# Patient Record
Sex: FEMALE | Race: WHITE | NOT HISPANIC OR LATINO | Employment: OTHER | URBAN - NONMETROPOLITAN AREA
[De-identification: names, ages, dates, MRNs, and addresses within clinical notes are randomized per-mention and may not be internally consistent; named-entity substitution may affect disease eponyms.]

---

## 2017-10-30 ENCOUNTER — OFFICE VISIT (OUTPATIENT)
Dept: GASTROENTEROLOGY | Facility: CLINIC | Age: 67
End: 2017-10-30

## 2017-10-30 ENCOUNTER — TELEPHONE (OUTPATIENT)
Dept: GASTROENTEROLOGY | Facility: CLINIC | Age: 67
End: 2017-10-30

## 2017-10-30 VITALS
SYSTOLIC BLOOD PRESSURE: 132 MMHG | HEIGHT: 65 IN | OXYGEN SATURATION: 98 % | HEART RATE: 69 BPM | WEIGHT: 123 LBS | DIASTOLIC BLOOD PRESSURE: 70 MMHG | BODY MASS INDEX: 20.49 KG/M2 | TEMPERATURE: 96.7 F

## 2017-10-30 DIAGNOSIS — K86.89 PANCREATIC INSUFFICIENCY: Primary | ICD-10-CM

## 2017-10-30 DIAGNOSIS — D12.6 ADENOMATOUS POLYP OF COLON, UNSPECIFIED PART OF COLON: ICD-10-CM

## 2017-10-30 PROBLEM — K63.5 COLON POLYP: Status: ACTIVE | Noted: 2017-10-30

## 2017-10-30 PROCEDURE — 99212 OFFICE O/P EST SF 10 MIN: CPT | Performed by: INTERNAL MEDICINE

## 2017-10-30 RX ORDER — LEVOTHYROXINE SODIUM 0.1 MG/1
100 TABLET ORAL DAILY
COMMUNITY
Start: 2017-10-10

## 2017-10-30 RX ORDER — BUMETANIDE 1 MG/1
1 TABLET ORAL DAILY
COMMUNITY
Start: 2017-10-20

## 2017-10-30 RX ORDER — BUTALBITAL, ACETAMINOPHEN AND CAFFEINE 50; 325; 40 MG/1; MG/1; MG/1
1 TABLET ORAL EVERY 8 HOURS PRN
COMMUNITY
Start: 2017-10-06

## 2017-10-30 RX ORDER — ERGOCALCIFEROL 1.25 MG/1
50000 CAPSULE ORAL 2 TIMES WEEKLY
COMMUNITY

## 2017-10-30 RX ORDER — IBUPROFEN 200 MG
200 TABLET ORAL EVERY 6 HOURS PRN
COMMUNITY
End: 2022-12-16

## 2017-10-30 RX ORDER — SITAGLIPTIN 100 MG/1
TABLET, FILM COATED ORAL
COMMUNITY
Start: 2017-09-08 | End: 2022-12-16

## 2017-10-30 RX ORDER — INSULIN DETEMIR 100 [IU]/ML
22 INJECTION, SOLUTION SUBCUTANEOUS DAILY
COMMUNITY
Start: 2017-09-22

## 2017-10-30 RX ORDER — POTASSIUM CHLORIDE 20 MEQ/1
20 TABLET, EXTENDED RELEASE ORAL 4 TIMES DAILY
COMMUNITY
Start: 2017-10-10

## 2017-10-30 RX ORDER — PANTOPRAZOLE SODIUM 40 MG/1
40 TABLET, DELAYED RELEASE ORAL DAILY
COMMUNITY
Start: 2017-10-06

## 2017-10-30 RX ORDER — SPIRONOLACTONE 25 MG/1
TABLET ORAL
COMMUNITY
Start: 2017-10-10 | End: 2022-12-16

## 2017-10-30 RX ORDER — SULFAMETHOXAZOLE AND TRIMETHOPRIM 800; 160 MG/1; MG/1
TABLET ORAL
COMMUNITY
Start: 2017-09-08 | End: 2017-10-30

## 2017-10-30 RX ORDER — INSULIN ASPART 100 [IU]/ML
INJECTION, SOLUTION INTRAVENOUS; SUBCUTANEOUS TAKE AS DIRECTED
COMMUNITY
Start: 2017-10-06

## 2017-10-30 NOTE — PROGRESS NOTES
[facility]enterology    Chief Complaint   Patient presents with   • GI Problem     no pancreas       Subjective     HPI    Gabi Dye is a 67 y.o. female who presents with a chief complaint of  Pancreatic insufficiency.    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 Mccauley in [place].  She has done great.  She has no further abdominal pain.  She is been weaned off all narcotics.  She still sees her endocrinologist every 6 months in [place].  She still requires Creon 36 one tablet with each meal.  Post transplant she was taking 3 tablets with each meal.  The 36 and 36,000 units of lipase in each tablet.  She denies any oily stool.  She is maintaining her weight.  She tells me her hemoglobin A1c runs in the low 6 range.  She states her insulin function is back to where it was pre-pancreatectomy.  She does Dum[place] her sugars bottoming out.      Past Medical History:   Diagnosis Date   • Antritis (stomach)    • Arthritis    • Colon polyp    • Disease of thyroid gland    • Hyperlipidemia    • Hypertension    • Meniere's disease    • Pancreatitis    • Shingles    • Skin cancer        Past Surgical History:   Procedure Laterality Date   • BACK SURGERY     • CHOLECYSTECTOMY     • COLONOSCOPY  09/12/2013   • ENDOSCOPY  03/24/2015   • ENDOSCOPY  09/23/2015   • HEMORRHOIDECTOMY     • HERNIA REPAIR     • HYSTERECTOMY     • MASTECTOMY     • PANCREAS SURGERY  12/22/2015    with auto-islet cell transplantation   • PANCREATECTOMY     • TONSILLECTOMY           Current Outpatient Prescriptions:   •  bumetanide (BUMEX) 1 MG tablet, , Disp: , Rfl:   •  butalbital-acetaminophen-caffeine (FIORICET, ESGIC) -40 MG per tablet, , Disp: , Rfl:   •  ibuprofen (ADVIL,MOTRIN) 200 MG tablet, Take 200 mg by mouth Every 6 (Six) Hours As Needed for Mild Pain ., Disp: , Rfl:   •  JANUVIA 100 MG tablet, , Disp: , Rfl:   •  LEVEMIR FLEXTOUCH 100 UNIT/ML injection, , Disp: , Rfl:   •   levothyroxine (SYNTHROID, LEVOTHROID) 100 MCG tablet, , Disp: , Rfl:   •  metFORMIN (GLUCOPHAGE) 500 MG tablet, Take 500 mg by mouth 2 (Two) Times a Day With Meals., Disp: , Rfl:   •  NOVOLOG FLEXPEN 100 UNIT/ML solution pen-injector sc pen, , Disp: , Rfl:   •  Pancrelipase, Lip-Prot-Amyl, (CREON) 05255 units capsule delayed-release particles, Take 36,000 units of lipase by mouth 4 (Four) Times a Day Before Meals & at Bedtime., Disp: 120 capsule, Rfl: 11  •  pantoprazole (PROTONIX) 40 MG EC tablet, , Disp: , Rfl:   •  potassium chloride (K-DUR,KLOR-CON) 20 MEQ CR tablet, , Disp: , Rfl:   •  spironolactone (ALDACTONE) 25 MG tablet, , Disp: , Rfl:   •  vitamin D (ERGOCALCIFEROL) 24256 units capsule capsule, Take 50,000 Units by mouth 2 (Two) Times a Week., Disp: , Rfl:     Allergies   Allergen Reactions   • Adhesive Tape    • Bacitracin    • Compazine [Prochlorperazine Edisylate]    • Neosporin [Neomycin-Bacitracin Zn-Polymyx]        Social History     Social History   • Marital status:      Spouse name: N/A   • Number of children: N/A   • Years of education: N/A     Occupational History   • Not on file.     Social History Main Topics   • Smoking status: Current Some Day Smoker   • Smokeless tobacco: Never Used   • Alcohol use No   • Drug use: No   • Sexual activity: Not on file     Other Topics Concern   • Not on file     Social History Narrative       Family History   Problem Relation Age of Onset   • Colon polyps Father    • Colon cancer Neg Hx        Review of Systems  General no fever chills or sweats weight stable  Gastrointestinal: Not present-abdominal pain, constipation, diarrhea, dysphagia, hematemesis, melena, odynophagia, nausea, vomiting, pyrosis, regurgitation, hematochezia,    Objective     Vitals:    10/30/17 1428   BP: 132/70   Pulse: 69   Temp: 96.7 °F (35.9 °C)   SpO2: 98%       Physical Exam  No acute distress. Vital signs as documented. Skin warm and dry and without overt rashes. EOMI,  sclera anicteric.  Neck without JVD or masses. Lungs clear to auscultation bilaterally, no rales. Heart exam notable for regular rhythm, normal sounds and absence of loud murmurs, rubs or gallops. Abdomen is soft, nontender, non distended, normal bowel sounds and without evidence of organomegaly, masses, or abdominal aortic enlargement. Extremities nonedematous, no cyanosis. Neuro alert, moves extremities.    No visits with results within 2 Week(s) from this visit.  Latest known visit with results is:    Admission on 01/06/2016, Discharged on 01/06/2016   Component Date Value Ref Range Status   • Glucose 01/06/2016 65* 70 - 100 mg/dL Final       No Images in the past 120 days found..      Assessment/Plan      Problem List Items Addressed This Visit        Digestive    Colon polyp       Other    Pancreatic insufficiency - Primary             She is doing well status post pancreatectomy with auto islet cell transplant.  She will need to stay on pancreatic enzyme supplementation lifelong.  I renewed her prescriptions for Creon 36 one tablet at start of each meal.  She actually takes some in the middle of each meal as instructed by her transplant surgeon.  She has done well with this.    She did have a colonoscopy in September 2013 finding 5 small polyps with a combination of hyperplastic and adenomatous polyps.  Most them were hyperplastic.  I therefore recommend a surveillance examination again in September 2018.  She is in agreement and I asked her to contact us next summer to set this colonoscopy up.  We did discuss the prep and the risks benefits alternatives today of the procedure.  She expressed understanding.  Continue ongoing management by primary care provider and other specialists.     EMR Dragon/transcription disclaimer:  Much of this encounter note is electronic transcription/translation of spoken language to printed text.  The electronic translation of spoken language may be erroneous, or at times,  nonsensical words or phrases may be inadvertently transcribed.  Although I have reviewed the note for such errors, some may still exist.    Biju Celis MD  4:00 PM  10/30/17

## 2018-09-10 ENCOUNTER — PREP FOR SURGERY (OUTPATIENT)
Dept: OTHER | Facility: HOSPITAL | Age: 68
End: 2018-09-10

## 2018-09-10 ENCOUNTER — TELEPHONE (OUTPATIENT)
Dept: GASTROENTEROLOGY | Facility: CLINIC | Age: 68
End: 2018-09-10

## 2018-09-10 DIAGNOSIS — D12.6 ADENOMATOUS POLYP OF COLON, UNSPECIFIED PART OF COLON: Primary | ICD-10-CM

## 2018-09-12 PROBLEM — D12.6 ADENOMATOUS POLYP OF COLON: Status: ACTIVE | Noted: 2018-09-12

## 2018-10-29 ENCOUNTER — ANESTHESIA EVENT (OUTPATIENT)
Dept: GASTROENTEROLOGY | Facility: HOSPITAL | Age: 68
End: 2018-10-29

## 2018-10-29 ENCOUNTER — ANESTHESIA (OUTPATIENT)
Dept: GASTROENTEROLOGY | Facility: HOSPITAL | Age: 68
End: 2018-10-29

## 2018-10-29 ENCOUNTER — HOSPITAL ENCOUNTER (OUTPATIENT)
Facility: HOSPITAL | Age: 68
Setting detail: HOSPITAL OUTPATIENT SURGERY
Discharge: HOME | End: 2018-10-29
Attending: INTERNAL MEDICINE | Admitting: INTERNAL MEDICINE

## 2018-10-29 VITALS
TEMPERATURE: 97.5 F | DIASTOLIC BLOOD PRESSURE: 65 MMHG | WEIGHT: 119 LBS | SYSTOLIC BLOOD PRESSURE: 130 MMHG | RESPIRATION RATE: 20 BRPM | OXYGEN SATURATION: 100 % | HEIGHT: 65 IN | HEART RATE: 78 BPM | BODY MASS INDEX: 19.83 KG/M2

## 2018-10-29 DIAGNOSIS — D12.6 ADENOMATOUS POLYP OF COLON, UNSPECIFIED PART OF COLON: ICD-10-CM

## 2018-10-29 LAB — GLUCOSE BLDC GLUCOMTR-MCNC: 138 MG/DL (ref 70–130)

## 2018-10-29 PROCEDURE — 88305 TISSUE EXAM BY PATHOLOGIST: CPT | Performed by: INTERNAL MEDICINE

## 2018-10-29 PROCEDURE — 25010000002 PROPOFOL 10 MG/ML EMULSION: Performed by: NURSE ANESTHETIST, CERTIFIED REGISTERED

## 2018-10-29 PROCEDURE — 45385 COLONOSCOPY W/LESION REMOVAL: CPT | Performed by: INTERNAL MEDICINE

## 2018-10-29 PROCEDURE — 82962 GLUCOSE BLOOD TEST: CPT

## 2018-10-29 RX ORDER — SODIUM CHLORIDE 9 MG/ML
500 INJECTION, SOLUTION INTRAVENOUS CONTINUOUS PRN
Status: DISCONTINUED | OUTPATIENT
Start: 2018-10-29 | End: 2018-10-29 | Stop reason: HOSPADM

## 2018-10-29 RX ORDER — SODIUM CHLORIDE 0.9 % (FLUSH) 0.9 %
3 SYRINGE (ML) INJECTION AS NEEDED
Status: DISCONTINUED | OUTPATIENT
Start: 2018-10-29 | End: 2018-10-29 | Stop reason: HOSPADM

## 2018-10-29 RX ORDER — ONDANSETRON 2 MG/ML
4 INJECTION INTRAMUSCULAR; INTRAVENOUS ONCE AS NEEDED
Status: DISCONTINUED | OUTPATIENT
Start: 2018-10-29 | End: 2018-10-29 | Stop reason: HOSPADM

## 2018-10-29 RX ORDER — PROPOFOL 10 MG/ML
VIAL (ML) INTRAVENOUS AS NEEDED
Status: DISCONTINUED | OUTPATIENT
Start: 2018-10-29 | End: 2018-10-29 | Stop reason: SURG

## 2018-10-29 RX ADMIN — SODIUM CHLORIDE: 9 INJECTION, SOLUTION INTRAVENOUS at 09:22

## 2018-10-29 RX ADMIN — PROPOFOL 80 MG: 10 INJECTION, EMULSION INTRAVENOUS at 09:30

## 2018-10-29 RX ADMIN — LIDOCAINE HYDROCHLORIDE 0.5 ML: 10 INJECTION, SOLUTION EPIDURAL; INFILTRATION; INTRACAUDAL; PERINEURAL at 08:44

## 2018-10-29 RX ADMIN — SODIUM CHLORIDE 500 ML: 9 INJECTION, SOLUTION INTRAVENOUS at 08:44

## 2018-10-29 RX ADMIN — PROPOFOL 320 MG: 10 INJECTION, EMULSION INTRAVENOUS at 09:31

## 2018-10-29 NOTE — ANESTHESIA PREPROCEDURE EVALUATION
Anesthesia Evaluation     Patient summary reviewed   no history of anesthetic complications:  NPO Solid Status: > 8 hours  NPO Liquid Status: > 4 hours           Airway   Mallampati: I  TM distance: >3 FB  Neck ROM: full  No difficulty expected  Dental      Comment: Chipped molars    Pulmonary    (+) a smoker Current Abstained day of surgery,   (-) asthma, sleep apnea  Cardiovascular   Exercise tolerance: good (4-7 METS)    (+) hypertension, hyperlipidemia,   (-) CAD, angina, cardiac stents      Neuro/Psych  (-) seizures, TIA, CVA  GI/Hepatic/Renal/Endo    (+)   diabetes mellitus using insulin, hypothyroidism,   (-) liver disease, no renal disease    ROS Comment: S/p pancreatectomy for pancreatitis    Musculoskeletal     Abdominal    Substance History      OB/GYN          Other                        Anesthesia Plan    ASA 3     general   total IV anesthesia  intravenous induction   Anesthetic plan, all risks, benefits, and alternatives have been provided, discussed and informed consent has been obtained with: patient.

## 2018-10-31 LAB
CYTO UR: NORMAL
LAB AP CASE REPORT: NORMAL
LAB AP CLINICAL INFORMATION: NORMAL
PATH REPORT.FINAL DX SPEC: NORMAL
PATH REPORT.GROSS SPEC: NORMAL

## 2019-11-04 RX ORDER — PANCRELIPASE 36000; 180000; 114000 [USP'U]/1; [USP'U]/1; [USP'U]/1
CAPSULE, DELAYED RELEASE PELLETS ORAL
Qty: 100 CAPSULE | Refills: 0 | OUTPATIENT
Start: 2019-11-04

## 2021-12-14 ENCOUNTER — TELEPHONE (OUTPATIENT)
Dept: GASTROENTEROLOGY | Facility: CLINIC | Age: 71
End: 2021-12-14

## 2021-12-14 NOTE — TELEPHONE ENCOUNTER
PT IS DUE FOR A REPEAT COLONOSCOPY. CALLED NUMBER IN CHART BUT IT HAS BEEN DISCONNECTED.  LETTER SENT TO PCP.

## 2022-12-15 PROBLEM — S42.241A: Status: ACTIVE | Noted: 2022-12-15

## 2022-12-15 NOTE — DISCHARGE INSTRUCTIONS
UPPER EXTREMITY POST-OP INSTRUCTIONS - DR. GAO    IMPORTANT PHONE NUMBERS:   For emergencies, please call [phone]   You may reach Dr. Gao and clinical staff at [phone]- M-F 8:00 am-5:00 pm   After 5pm or on the weekends, please call [phone]   Call immediately if you have any of the following symptoms:     Elevated temperature above 101.5 degrees for more than 48 hours after surgery     Persistent drainage from wound     Severe pain around surgical site    Sling use: The sling is provided for your comfort and to ensure proper healing of your repair following surgery. Please place the abduction pillow with the curved side against your side and the sling on the side of the pillow. Your surgery requires that you wear the sling if noted below.  ____ For comfort. Remove sling 24 hours and begin range of motion exercises  __x__ At all times except bathing, dressing, and therapy. Also wear the sling during sleep.  ____ No sling required    Bathing:  ___No bandages, no restrictions!!  _x__You may remove you dressing and shower on the 3rd day after surgery (Ex. Tu surgery, shower on Friday)  ** if you are told to it is ok to remove your dressing and shower, DO NOT SOAK your incisions in a tub.  ___Keep splint clean, dry, and intact. DO NOT place foreign objects into your splint.      Dressings: Keep dressing/splint intact unless instructed otherwise below. SOME DRAINAGE IS NORMAL!     DO NOT touch or apply ointment to the incision.     DO NOT remove the steri-strips over the incisions (if you have steri-strips). They will         generally fall off on their own or can be removed 1 weeksafter surgery.     If you have yellow gauze and it comes off, do not worry about it. Leave them off.    Signs of infection that warrant a phone call to our clinical line:     o Excessive drainage or redness     o Red streaking coming away from the incision  o Increased pain  o Increased temperature above 101  degrees      Physical Therapy:        *  Your physical therapy status will be discussed with you postoperatively and at your first post-op appointment. Some injuries will not require physical therapy.      *  If you have a shoulder manipulation, please schedule therapy for the next day      Medications: You will be discharged with the appropriate medications following your surgery. Fill these at the pharmacy and take them as directed on the label. Not all of the medications below may be prescribed. Occasionally, other medications may be prescribed with specific instructions.    Percocet/Lortab (oxycodone/hydrocodone with tylenol) - Pain Medication, will cause drowsiness, possibly itchiness (this is NOT an allergy - use benadryl or an over the counter allergy medication such as Claritin or Zyrtec)     o Take 1-2 tablets every 4-6 hours. DO NOT EXCEED 4,000mg of Tylenol in 24 hours.  **DO NOT MIX WITH ALCOHOL, DRIVE WHILE TAKING, OR TAKE with extra TYLENOL**    Colace (Docusate) - stool softener, used for constipation. Take this only if you feel constipated.      Zofran (Ondansetron) or Phenergan - Anti-nausea medication, will cause drowsiness      *Starting January 2021, all narcotic medication must be prescribed electronically to your pharmacy.  Be sure to notify nursing of your preferred pharmacy.  If you are running low on pain medications, please notify us if you need a refill 24-48 hours prior to when you run out, so we can make arrangements to refill the prescription for you if we determine is necessary     What to expect after a Nerve Block  Nerve blocks administered to block pain affect many types of nerves, including those nerves that control movement, pain, and normal sensation.  Following a nerve block, you may notice some bruising at the site where the block was given.  You may experience sensations such as:  numbness of the affected area or limb, tingling, heaviness (that is the limb feels heavy to you),  weakness or inability to move the affected arm or leg, or a feeling as if your arm or leg has “fallen asleep”.    A nerve block can last from 9-18 hours depending on the medications used.  Certain medications can last up to 72 hours, your anesthesiologist may have discussed this with you pre-op. Usually the weakness wears off first followed by the tingling and heaviness.  As the block wears off, you may begin to notice pain; however, this sequence of events may occur in any order.  Typically, you will be able to move your limb before you will feel it.  Once a nerve block begins to wear off, the effects are usually completely gone within 60 minutes.    If you experience continued side effects that you believe are block related, please call your healthcare provider.  Please see block-specific instructions below.      Instructions for any block involving the shoulder or arm  If you have had any kind of shoulder/arm block, you will go home with your arm in a sling.  Wear the sling until the block has completely worn off or as directed by your doctor.  You may be required to wear it for a longer period of time per your surgeon’s recommendations.  I you have had a shoulder/arm block; it is a good idea to sleep on a recliner with pillows under your arm.    Note:  If you have severe or prolonged shortness of breath, please seek medical assistance as soon as possible.    Protection of a “blocked” arm (limb)  After a nerve block, you cannot feel pain, pressure, or extremes of temperature in the affected limb.  And because of this, your blocked limb is at more risk for injury.  For example, it is possible to burn your limb on an extremely hot surface without feeling it.  When resting, it is important to reposition your limb periodically to avoid prolonged pressure on it.  This may require the use of pillows and padding.  While sleeping, you should avoid rolling onto the affected limb or putting too much pressure on it.  If you  have a cast or tight dressing, check the color of your fingers of the affected limb.  Call your surgeon if they look discolored (that is, dusky, dark colored)  Use caution in cold weather.  Cover your limb appropriately to protect it from the cold.  Pain Management  Your surgeon will give you a prescription for pain medication.  Begin taking this before the nerve block wears off.  Bear in mind that sometimes the block can wear off in the middle of the night.

## 2022-12-16 ENCOUNTER — HOSPITAL ENCOUNTER (OUTPATIENT)
Dept: GENERAL RADIOLOGY | Facility: HOSPITAL | Age: 72
Discharge: HOME | End: 2022-12-16

## 2022-12-16 ENCOUNTER — PRE-ADMISSION TESTING (OUTPATIENT)
Dept: PREADMISSION TESTING | Facility: HOSPITAL | Age: 72
End: 2022-12-16

## 2022-12-16 VITALS
SYSTOLIC BLOOD PRESSURE: 151 MMHG | HEART RATE: 83 BPM | WEIGHT: 126.1 LBS | RESPIRATION RATE: 18 BRPM | BODY MASS INDEX: 21.01 KG/M2 | OXYGEN SATURATION: 100 % | DIASTOLIC BLOOD PRESSURE: 86 MMHG | HEIGHT: 65 IN

## 2022-12-16 LAB
ALBUMIN SERPL-MCNC: 4.5 G/DL (ref 3.5–5.2)
ALBUMIN/GLOB SERPL: 1.6 G/DL
ALP SERPL-CCNC: 116 U/L (ref 39–117)
ALT SERPL W P-5'-P-CCNC: 19 U/L (ref 1–33)
ANION GAP SERPL CALCULATED.3IONS-SCNC: 10 MMOL/L (ref 5–15)
AST SERPL-CCNC: 26 U/L (ref 1–32)
BACTERIA UR QL AUTO: ABNORMAL /HPF
BASOPHILS # BLD AUTO: 0.16 10*3/MM3 (ref 0–0.2)
BASOPHILS NFR BLD AUTO: 1.4 % (ref 0–1.5)
BILIRUB SERPL-MCNC: 0.3 MG/DL (ref 0–1.2)
BILIRUB UR QL STRIP: NEGATIVE
BUN SERPL-MCNC: 17 MG/DL (ref 8–23)
BUN/CREAT SERPL: 25.8 (ref 7–25)
CALCIUM SPEC-SCNC: 8.7 MG/DL (ref 8.6–10.5)
CHLORIDE SERPL-SCNC: 106 MMOL/L (ref 98–107)
CLARITY UR: CLEAR
CO2 SERPL-SCNC: 25 MMOL/L (ref 22–29)
COLOR UR: YELLOW
CREAT SERPL-MCNC: 0.66 MG/DL (ref 0.57–1)
DEPRECATED RDW RBC AUTO: 53.1 FL (ref 37–54)
EGFRCR SERPLBLD CKD-EPI 2021: 93.9 ML/MIN/1.73
EOSINOPHIL # BLD AUTO: 0.21 10*3/MM3 (ref 0–0.4)
EOSINOPHIL NFR BLD AUTO: 1.9 % (ref 0.3–6.2)
ERYTHROCYTE [DISTWIDTH] IN BLOOD BY AUTOMATED COUNT: 14.1 % (ref 12.3–15.4)
GLOBULIN UR ELPH-MCNC: 2.9 GM/DL
GLUCOSE SERPL-MCNC: 55 MG/DL (ref 65–99)
GLUCOSE UR STRIP-MCNC: NEGATIVE MG/DL
HCT VFR BLD AUTO: 36.3 % (ref 34–46.6)
HGB BLD-MCNC: 11.5 G/DL (ref 12–15.9)
HGB UR QL STRIP.AUTO: NEGATIVE
HYALINE CASTS UR QL AUTO: ABNORMAL /LPF
IMM GRANULOCYTES # BLD AUTO: 0.03 10*3/MM3 (ref 0–0.05)
IMM GRANULOCYTES NFR BLD AUTO: 0.3 % (ref 0–0.5)
INR PPP: 0.91 (ref 0.91–1.09)
KETONES UR QL STRIP: ABNORMAL
LEUKOCYTE ESTERASE UR QL STRIP.AUTO: ABNORMAL
LYMPHOCYTES # BLD AUTO: 4 10*3/MM3 (ref 0.7–3.1)
LYMPHOCYTES NFR BLD AUTO: 35.7 % (ref 19.6–45.3)
MCH RBC QN AUTO: 32.2 PG (ref 26.6–33)
MCHC RBC AUTO-ENTMCNC: 31.7 G/DL (ref 31.5–35.7)
MCV RBC AUTO: 101.7 FL (ref 79–97)
MONOCYTES # BLD AUTO: 1.08 10*3/MM3 (ref 0.1–0.9)
MONOCYTES NFR BLD AUTO: 9.6 % (ref 5–12)
NEUTROPHILS NFR BLD AUTO: 5.72 10*3/MM3 (ref 1.7–7)
NEUTROPHILS NFR BLD AUTO: 51.1 % (ref 42.7–76)
NITRITE UR QL STRIP: POSITIVE
NRBC BLD AUTO-RTO: 0 /100 WBC (ref 0–0.2)
PH UR STRIP.AUTO: 5.5 [PH] (ref 5–8)
PLATELET # BLD AUTO: 615 10*3/MM3 (ref 140–450)
PMV BLD AUTO: 8.7 FL (ref 6–12)
POTASSIUM SERPL-SCNC: 3.7 MMOL/L (ref 3.5–5.2)
PROT SERPL-MCNC: 7.4 G/DL (ref 6–8.5)
PROT UR QL STRIP: NEGATIVE
PROTHROMBIN TIME: 12.4 SECONDS (ref 11.8–14.8)
RBC # BLD AUTO: 3.57 10*6/MM3 (ref 3.77–5.28)
RBC # UR STRIP: ABNORMAL /HPF
REF LAB TEST METHOD: ABNORMAL
SODIUM SERPL-SCNC: 141 MMOL/L (ref 136–145)
SP GR UR STRIP: 1.03 (ref 1–1.03)
SQUAMOUS #/AREA URNS HPF: ABNORMAL /HPF
UROBILINOGEN UR QL STRIP: ABNORMAL
WBC # UR STRIP: ABNORMAL /HPF
WBC NRBC COR # BLD: 11.2 10*3/MM3 (ref 3.4–10.8)

## 2022-12-16 PROCEDURE — 71046 X-RAY EXAM CHEST 2 VIEWS: CPT

## 2022-12-16 PROCEDURE — 85025 COMPLETE CBC W/AUTO DIFF WBC: CPT

## 2022-12-16 PROCEDURE — 36415 COLL VENOUS BLD VENIPUNCTURE: CPT

## 2022-12-16 PROCEDURE — 87086 URINE CULTURE/COLONY COUNT: CPT

## 2022-12-16 PROCEDURE — 93010 ELECTROCARDIOGRAM REPORT: CPT | Performed by: EMERGENCY MEDICINE

## 2022-12-16 PROCEDURE — 87077 CULTURE AEROBIC IDENTIFY: CPT

## 2022-12-16 PROCEDURE — 93005 ELECTROCARDIOGRAM TRACING: CPT

## 2022-12-16 PROCEDURE — 85610 PROTHROMBIN TIME: CPT

## 2022-12-16 PROCEDURE — 80053 COMPREHEN METABOLIC PANEL: CPT

## 2022-12-16 PROCEDURE — 87186 SC STD MICRODIL/AGAR DIL: CPT

## 2022-12-16 PROCEDURE — 81001 URINALYSIS AUTO W/SCOPE: CPT

## 2022-12-16 RX ORDER — CYCLOBENZAPRINE HCL 5 MG
5 TABLET ORAL EVERY 12 HOURS PRN
COMMUNITY

## 2022-12-16 RX ORDER — MELOXICAM 15 MG/1
15 TABLET ORAL DAILY
COMMUNITY

## 2022-12-16 RX ORDER — COLCHICINE 0.6 MG/1
0.6 TABLET ORAL DAILY PRN
COMMUNITY

## 2022-12-16 RX ORDER — ALLOPURINOL 100 MG/1
100 TABLET ORAL DAILY
COMMUNITY

## 2022-12-16 RX ORDER — LOSARTAN POTASSIUM 25 MG/1
25 TABLET ORAL DAILY
COMMUNITY

## 2022-12-16 NOTE — OP NOTE
Patient Name: Mark  MRN: [id number]  : 1950      DATE of SURGERY: 2022    SURGEON: Wong Kulkarni MD    ASSISTANT: NONE    PREOPERATIVE DIAGNOSIS: Acute traumatic displaced 4 part fracture of the surgical neck of the  humerus, initial encounter for closed fracture     POSTOPERATIVE DIAGNOSIS:  Acute traumatic displaced 4 part fracture of the surgical neck of the  humerus, initial encounter for closed fracture    PROCEDURE PERFORMED: Right Reverse Total Shoulder Arthroplasty    IMPLANTS: [employer] Revers      Baseplate: small                  Glenosphere: 36 + 4      Poly:  + 6 metal, + 3 poly      Suture cup: 36 neutral               Stem: 9 pressfit    ANESTHESIA USED: General endotrachial anesthesia, interscalene block    OPERATIVE INDICATIONS:  72 y.o. female status post a traumatic injury to the shoulder after a fall at home on 22.  Xrays of the shoulder showed a severely displaced and comminuted proximal humerus fracture. Given the patient's age and fracture displacement, I recommended the above named surgery.  The surgical indications were to relieve pain, improve function, and prevent future disability.  Risks included, but were not limited to, that of anesthesia, bleeding, infection, pain, damage to local structures, need for further surgery, failure of repair, stiffness, failure of implants, and loss of function.      ESTIMATED BLOOD LOSS: 200 mL    DRAINS: none     COMPLICATIONS: none    SPECIMENS: none    FINDINGS: see op note    PROCEDURE in DETAIL:  The patient was seen in the preoperative holding room, once again the informed consent was reviewed with the patient and signed.  The site of surgery was marked with the patient's agreement.  After being transported to the operating room, a timeout was performed identifying the correct patient as well as the operative site.  Dose appropriate IV antibiotics were given prior to incision.  The patient was positioned in the  "beach chair position, all bony prominences were protected and a sterile prep and drape was performed.  The surgical site was draped with Ioban dressing.    A deltopectoral approach to the shoulder joint was utilized as soft tissue was dissected down the level of the cephalic vein which was taken laterally along with the deltoid.  The biceps tendon was located, tenodesed to the superior border of the pectoralis major tendon insertion.  The rotator interval was opened noting the severe fracture of the proximal humerus.  A tagging stitch was placed in the subscapularis and with progressive external rotation of the shoulder, the tendon and underlying capsule were peeled from the less tuberosity fragment.  The greater tuberosity and humeral head was removed along with multiple small bone fragments.  The axillary nerve was palpated and protected, verified with a \"tug test.\"      Strategic retractors were placed surround the glenoid, the axillary nerve was protected, and a complete capsulectomy was performed.  The labrum was excised.  A guidepin was placed in the inferior-central aspect of the glenoid, following by a reaming device, and drilling of the central peg hole.  A baseplate was impacted and central, superior, and inferior locking screws were placed.  The glenosphere was then impacted without complication.    Attention was turned back to the humeral side where reamers were inserted into the humeral canal.  The humeral canal was irrigated and dried thoroughly followed by insertion of progressively sized broaches until a stable fit.  It was not felt that cement was needed.  The stem was inserted at 20 degrees of retroversion approximately 5 cm proximal to the pec major insertion.  Trial polyethylenes were placed until range of motion and stability were adequate.  The conjoint tendon showed increased tension. With traction of the shoulder, the entire scapula was translating without dissociation of the " polyethylene.    The final implants were placed and the shoulder was once again reduced showing excellent stability and range of motion.    The incision was thoroughly irrigated, followed by closure in layers.  The skin was closed with adhesive glue.  A sterile dressing and sling were placed.  Counts were correct.    The patient was awakened by anesthesia, transported to the recovery room in stable condition.    POSTOPERATIVE PLAN:  1) Discharge home once pain is controlled  2) Reverse total shoulder protocol    Electronically signed by Wong Kulkarni MD on 12/20/2022 at 14:03 CST

## 2022-12-16 NOTE — DISCHARGE INSTRUCTIONS
Before you come to the hospital        Arrival time: AS DIRECTED BY OFFICE     YOU MAY TAKE THE FOLLOWING MEDICATION(S) THE MORNING OF SURGERY WITH A SIP OF WATER: OK to take pain pill morning of surgery with a sip of water; hold losartan 24 hours prior to surgery            ALL OTHER HOME MEDICATION CHECK WITH YOUR PHYSICIAN (especially if   you are taking diabetes medicines or blood thinners)    Do not take any Erectile Dysfunction medications (EX: CIALIS, VIAGRA) 24 hours prior to surgery.      If you were given and instructed to use a germ- killing soap, use as directed the night before surgery and again the morning of surgery or as directed by your surgeon. (Use one-half of the bottle with each shower.)   See attached information for How to Use Chlorhexidine for Bathing if applicable.            Eating and drinking restrictions prior to scheduled arrival time    2 Hours before arrival time STOP   Drinking Clear liquids (water, apple juice-no pulp)     6 Hours before arrival time STOP   Milk or drinks that contain milk, full liquids    6 Hours before arrival time STOP   Light meals or foods, such as toast or cereal    8 Hours before arrival time STOP   Heavy foods, such as meat, fried foods, or fatty foods    (It is extremely important that you follow these guidelines to prevent delay or cancelation of your procedure)     Clear Liquids  Water and flavored water                                                                      Clear Fruit juices, such as cranberry juice and apple juice.  Black coffee (NO cream of any kind, including powdered).  Plain tea  Clear bouillon or broth.  Flavored gelatin.  Soda.  Gatorade or Powerade.  Full liquid examples  Juices that have pulp.  Frozen ice pops that contain fruit pieces.  Coffee with creamer  Milk.  Yogurt.                MANAGING PAIN AFTER SURGERY    We know you are probably wondering what your pain will be like after surgery.  Following surgery it is unrealistic  to expect you will not have pain.   Pain is how our bodies let us know that something is wrong or cautions us to be careful.  That said, our goal is to make your pain tolerable.    Methods we may use to treat your pain include (oral or IV medications, PCAs, epidurals, nerve blocks, etc.)   While some procedures require IV pain medications for a short time after surgery, transitioning to pain medications by mouth allows for better management of pain.   Your nurse will encourage you to take oral pain medications whenever possible.  IV medications work almost immediately, but only last a short while.  Taking medications by mouth allows for a more constant level of medication in your blood stream for a longer period of time.      Once your pain is out of control it is harder to get back under control.  It is important you are aware when your next dose of pain medication is due.  If you are admitted, your nurse may write the time of your next dose on the white board in your room to help you remember.      We are interested in your pain and encourage you to inform us about aggravating factors during your visit.   Many times a simple repositioning every few hours can make a big difference.    If your physician says it is okay, do not let your pain prevent you from getting out of bed. Be sure to call your nurse for assistance prior to getting up so you do not fall.      Before surgery, please decide your tolerable pain goal.  These faces help describe the pain ratings we use on a 0-10 scale.   Be prepared to tell us your goal and whether or not you take pain or anxiety medications at home.          Preparing for Surgery  Preparing for surgery is an important part of your care. It can make things go more smoothly and help you avoid complications. The steps leading up to surgery may vary among hospitals. Follow all instructions given to you by your health care providers. Ask questions if you do not understand something. Talk  about any concerns that you have.  Here are some questions to consider asking before your surgery:  If my surgery is not an emergency (is elective), when would be the best time to have the surgery?  What arrangements do I need to make for work, home, or school?  What will my recovery be like? How long will it be before I can return to normal activities?  Will I need to prepare my home? Will I need to arrange care for me or my children?  Should I expect to have pain after surgery? What are my pain management options? Are there nonmedical options that I can try for pain?  Tell a health care provider about:  Any allergies you have.  All medicines you are taking, including vitamins, herbs, eye drops, creams, and over-the-counter medicines.  Any problems you or family members have had with anesthetic medicines.  Any blood disorders you have.  Any surgeries you have had.  Any medical conditions you have.  Whether you are pregnant or may be pregnant.  What are the risks?  The risks and complications of surgery depend on the specific procedure that you have. Discuss all the risks with your health care providers before your surgery. Ask about common surgical complications, which may include:  Infection.  Bleeding or a need for blood replacement (transfusion).  Allergic reactions to medicines.  Damage to surrounding nerves, tissues, or structures.  A blood clot.  Scarring.  Failure of the surgery to correct the problem.  Follow these instructions before the procedure:  Several days or weeks before your procedure  You may have a physical exam by your primary health care provider to make sure it is safe for you to have surgery.  You may have testing. This may include a chest X-ray, blood and urine tests, electrocardiogram (ECG), or other testing.  Ask your health care provider about:  Changing or stopping your regular medicines. This is especially important if you are taking diabetes medicines or blood thinners.  Taking  medicines such as aspirin and ibuprofen. These medicines can thin your blood. Do not take these medicines unless your health care provider tells you to take them.  Taking over-the-counter medicines, vitamins, herbs, and supplements.  Do not use any products that contain nicotine or tobacco, such as cigarettes and e-cigarettes. If you need help quitting, ask your health care provider.  Avoid alcohol.  Ask your health care provider if there are exercises you can do to prepare for surgery.  Eat a healthy diet.   Plan to have someone take you home from the hospital or clinic.  Plan to have a responsible adult care for you for at least 24 hours after you leave the hospital or clinic. This is important.  The day before your procedure  You may be given antibiotic medicine to take by mouth to help prevent infection. Take it as told by your health care provider.  You may be asked to shower with a germ-killing soap.  Follow instructions from your health care provider about eating and drinking restrictions. This includes gum, mints and hard candy.  Pack comfortable clothes according to your procedure.   The day of your procedure  You may need to take another shower with a germ-killing soap before you leave home in the morning.  With a small sip of water, take only the medicines that you are told to take.  Remove all jewelry including rings.   Leave anything you consider valuable at home except hearing aids if needed.  You do not need to bring your home medications into the hospital.   Do not wear any makeup, nail polish, powder, deodorant, lotion, hair accessories, or anything on your skin or body except your clothes.  If you will be staying in the hospital, bring a case to hold your glasses, contacts, or dentures. You may also want to bring your robe and non-skid footwear.       (Do not use denture adhesives since you will be asked to remove them during  surgery).   If you wear oxygen at home, bring it with you the day of  surgery.  If instructed by your health care provider, bring your sleep apnea device with you on the day of your surgery (if this applies to you).  You may want to leave your suitcase and sleep apnea device in the car until after surgery.   Arrive at the hospital as scheduled.  Bring a friend or family member with you who can help to answer questions and be present while you meet with your health care provider.  At the hospital  When you arrive at the hospital:  Go to registration located at the main entrance of the hospital. You will be registered and given a beeper and a sticker sheet. Take the stickers to the Outpatient nurses desk and place in the black tray. This is to notify staff that you have arrived. Then return to the lobby to wait.   When your beeper lights up and vibrates proceed through the double doors, under the stairs, and a member of the Outpatient Surgery staff will escort you to your preoperative room.  You may have to wear compression sleeves. These help to prevent blood clots and reduce swelling in your legs.  An IV may be inserted into one of your veins.              In the operating room, you may be given one or more of the following:        A medicine to help you relax (sedative).        A medicine to numb the area (local anesthetic).        A medicine to make you fall asleep (general anesthetic).        A medicine that is injected into an area of your body to numb everything below the                      injection site (regional anesthetic).  You may be given an antibiotic through your IV to help prevent infection.  Your surgical site will be marked or identified.    Contact a health care provider if you:  Develop a fever of more than 100.4°F (38°C) or other feelings of illness during the 48 hours before your surgery.  Have symptoms that get worse.  Have questions or concerns about your surgery.  Summary  Preparing for surgery can make the procedure go more smoothly and lower your risk of  complications.  Before surgery, make a list of questions and concerns to discuss with your surgeon. Ask about the risks and possible complications.  In the days or weeks before your surgery, follow all instructions from your health care provider. You may need to stop smoking, avoid alcohol, follow eating restrictions, and change or stop your regular medicines.  Contact your surgeon if you develop a fever or other signs of illness during the few days before your surgery.  This information is not intended to replace advice given to you by your health care provider. Make sure you discuss any questions you have with your health care provider.  Document Revised: 12/21/2018 Document Reviewed: 10/23/2018  Elsevier Patient Education © 2021 [employer]

## 2022-12-18 LAB — BACTERIA SPEC AEROBE CULT: ABNORMAL

## 2022-12-19 LAB
QT INTERVAL: 360 MS
QTC INTERVAL: 423 MS

## 2022-12-20 ENCOUNTER — HOSPITAL ENCOUNTER (OUTPATIENT)
Facility: HOSPITAL | Age: 72
Setting detail: HOSPITAL OUTPATIENT SURGERY
Discharge: HOME | End: 2022-12-20
Attending: ORTHOPAEDIC SURGERY | Admitting: ORTHOPAEDIC SURGERY

## 2022-12-20 ENCOUNTER — ANESTHESIA (OUTPATIENT)
Dept: PERIOP | Facility: HOSPITAL | Age: 72
End: 2022-12-20

## 2022-12-20 ENCOUNTER — ANESTHESIA EVENT (OUTPATIENT)
Dept: PERIOP | Facility: HOSPITAL | Age: 72
End: 2022-12-20

## 2022-12-20 ENCOUNTER — APPOINTMENT (OUTPATIENT)
Dept: GENERAL RADIOLOGY | Facility: HOSPITAL | Age: 72
End: 2022-12-20

## 2022-12-20 VITALS
OXYGEN SATURATION: 96 % | HEART RATE: 91 BPM | DIASTOLIC BLOOD PRESSURE: 76 MMHG | TEMPERATURE: 97 F | SYSTOLIC BLOOD PRESSURE: 141 MMHG | RESPIRATION RATE: 16 BRPM

## 2022-12-20 DIAGNOSIS — S42.241A 4-PART FRACTURE OF SURGICAL NECK OF RIGHT HUMERUS, INITIAL ENCOUNTER FOR CLOSED FRACTURE: Primary | ICD-10-CM

## 2022-12-20 LAB
GLUCOSE BLDC GLUCOMTR-MCNC: 46 MG/DL (ref 70–130)
GLUCOSE BLDC GLUCOMTR-MCNC: 85 MG/DL (ref 70–130)
GLUCOSE BLDC GLUCOMTR-MCNC: 91 MG/DL (ref 70–130)

## 2022-12-20 PROCEDURE — 25010000002 MIDAZOLAM PER 1 MG: Performed by: ANESTHESIOLOGY

## 2022-12-20 PROCEDURE — C9290 INJ, BUPIVACAINE LIPOSOME: HCPCS | Performed by: ANESTHESIOLOGY

## 2022-12-20 PROCEDURE — 73030 X-RAY EXAM OF SHOULDER: CPT

## 2022-12-20 PROCEDURE — C1776 JOINT DEVICE (IMPLANTABLE): HCPCS | Performed by: ORTHOPAEDIC SURGERY

## 2022-12-20 PROCEDURE — 25010000002 CEFAZOLIN PER 500 MG: Performed by: ORTHOPAEDIC SURGERY

## 2022-12-20 PROCEDURE — 0 BUPIVACAINE LIPOSOME 1.3 % SUSPENSION: Performed by: ANESTHESIOLOGY

## 2022-12-20 PROCEDURE — 25010000002 ONDANSETRON PER 1 MG

## 2022-12-20 PROCEDURE — 25010000002 PROPOFOL 10 MG/ML EMULSION

## 2022-12-20 PROCEDURE — 82962 GLUCOSE BLOOD TEST: CPT

## 2022-12-20 PROCEDURE — 76942 ECHO GUIDE FOR BIOPSY: CPT | Performed by: ORTHOPAEDIC SURGERY

## 2022-12-20 PROCEDURE — 25010000002 FENTANYL CITRATE (PF) 50 MCG/ML SOLUTION: Performed by: ANESTHESIOLOGY

## 2022-12-20 PROCEDURE — 25010000002 FENTANYL CITRATE (PF) 100 MCG/2ML SOLUTION

## 2022-12-20 DEVICE — CUP SUT UNIVERS REVERS 36 NTRL: Type: IMPLANTABLE DEVICE | Site: SHOULDER | Status: FUNCTIONAL

## 2022-12-20 DEVICE — SPACR HUM/SHLDR UNIVERS REVERS TI 36 PLS6: Type: IMPLANTABLE DEVICE | Site: SHOULDER | Status: FUNCTIONAL

## 2022-12-20 DEVICE — IMPLANTABLE DEVICE: Type: IMPLANTABLE DEVICE | Site: SHOULDER | Status: FUNCTIONAL

## 2022-12-20 DEVICE — GLENOSPHERE UNIVERS REVERS S/36 PLS4 LAT: Type: IMPLANTABLE DEVICE | Site: SHOULDER | Status: FUNCTIONAL

## 2022-12-20 DEVICE — SCRW GLEN UNIVERS REVERS PERIPH 4.5X30MM: Type: IMPLANTABLE DEVICE | Site: SHOULDER | Status: FUNCTIONAL

## 2022-12-20 DEVICE — STEM HUM/SHLDR UNIVERS REVERS SZ9: Type: IMPLANTABLE DEVICE | Site: SHOULDER | Status: FUNCTIONAL

## 2022-12-20 DEVICE — SCRW GLEN UNIVERS REVERS CENTRL 6.5X25MM: Type: IMPLANTABLE DEVICE | Site: SHOULDER | Status: FUNCTIONAL

## 2022-12-20 RX ORDER — SODIUM CHLORIDE 0.9 % (FLUSH) 0.9 %
10 SYRINGE (ML) INJECTION EVERY 12 HOURS SCHEDULED
Status: DISCONTINUED | OUTPATIENT
Start: 2022-12-20 | End: 2022-12-20 | Stop reason: HOSPADM

## 2022-12-20 RX ORDER — HYDROCODONE BITARTRATE AND ACETAMINOPHEN 10; 325 MG/1; MG/1
1 TABLET ORAL ONCE
COMMUNITY
End: 2022-12-20 | Stop reason: HOSPADM

## 2022-12-20 RX ORDER — FENTANYL CITRATE 50 UG/ML
INJECTION, SOLUTION INTRAMUSCULAR; INTRAVENOUS AS NEEDED
Status: DISCONTINUED | OUTPATIENT
Start: 2022-12-20 | End: 2022-12-20 | Stop reason: SURG

## 2022-12-20 RX ORDER — FLUMAZENIL 0.1 MG/ML
0.2 INJECTION INTRAVENOUS AS NEEDED
Status: DISCONTINUED | OUTPATIENT
Start: 2022-12-20 | End: 2022-12-20 | Stop reason: HOSPADM

## 2022-12-20 RX ORDER — NEOSTIGMINE METHYLSULFATE 5 MG/5 ML
SYRINGE (ML) INTRAVENOUS AS NEEDED
Status: DISCONTINUED | OUTPATIENT
Start: 2022-12-20 | End: 2022-12-20 | Stop reason: SURG

## 2022-12-20 RX ORDER — ONDANSETRON 2 MG/ML
4 INJECTION INTRAMUSCULAR; INTRAVENOUS ONCE AS NEEDED
Status: DISCONTINUED | OUTPATIENT
Start: 2022-12-20 | End: 2022-12-20 | Stop reason: HOSPADM

## 2022-12-20 RX ORDER — DROPERIDOL 2.5 MG/ML
0.62 INJECTION, SOLUTION INTRAMUSCULAR; INTRAVENOUS ONCE AS NEEDED
Status: DISCONTINUED | OUTPATIENT
Start: 2022-12-20 | End: 2022-12-20 | Stop reason: HOSPADM

## 2022-12-20 RX ORDER — SODIUM CHLORIDE 0.9 % (FLUSH) 0.9 %
3-10 SYRINGE (ML) INJECTION AS NEEDED
Status: DISCONTINUED | OUTPATIENT
Start: 2022-12-20 | End: 2022-12-20 | Stop reason: HOSPADM

## 2022-12-20 RX ORDER — FENTANYL CITRATE 50 UG/ML
25 INJECTION, SOLUTION INTRAMUSCULAR; INTRAVENOUS
Status: DISCONTINUED | OUTPATIENT
Start: 2022-12-20 | End: 2022-12-20 | Stop reason: HOSPADM

## 2022-12-20 RX ORDER — LABETALOL HYDROCHLORIDE 5 MG/ML
5 INJECTION, SOLUTION INTRAVENOUS
Status: DISCONTINUED | OUTPATIENT
Start: 2022-12-20 | End: 2022-12-20 | Stop reason: HOSPADM

## 2022-12-20 RX ORDER — ROCURONIUM BROMIDE 10 MG/ML
INJECTION, SOLUTION INTRAVENOUS AS NEEDED
Status: DISCONTINUED | OUTPATIENT
Start: 2022-12-20 | End: 2022-12-20 | Stop reason: SURG

## 2022-12-20 RX ORDER — LIDOCAINE HYDROCHLORIDE 20 MG/ML
INJECTION, SOLUTION EPIDURAL; INFILTRATION; INTRACAUDAL; PERINEURAL AS NEEDED
Status: DISCONTINUED | OUTPATIENT
Start: 2022-12-20 | End: 2022-12-20 | Stop reason: SURG

## 2022-12-20 RX ORDER — ONDANSETRON 4 MG/1
4 TABLET, FILM COATED ORAL EVERY 8 HOURS PRN
Qty: 10 TABLET | Refills: 0 | Status: SHIPPED | OUTPATIENT
Start: 2022-12-20

## 2022-12-20 RX ORDER — LIDOCAINE HYDROCHLORIDE 10 MG/ML
0.5 INJECTION, SOLUTION EPIDURAL; INFILTRATION; INTRACAUDAL; PERINEURAL ONCE AS NEEDED
Status: DISCONTINUED | OUTPATIENT
Start: 2022-12-20 | End: 2022-12-20 | Stop reason: HOSPADM

## 2022-12-20 RX ORDER — OXYCODONE AND ACETAMINOPHEN 7.5; 325 MG/1; MG/1
2 TABLET ORAL EVERY 4 HOURS PRN
Status: DISCONTINUED | OUTPATIENT
Start: 2022-12-20 | End: 2022-12-20 | Stop reason: HOSPADM

## 2022-12-20 RX ORDER — MIDAZOLAM HYDROCHLORIDE 1 MG/ML
2 INJECTION INTRAMUSCULAR; INTRAVENOUS ONCE
Status: COMPLETED | OUTPATIENT
Start: 2022-12-20 | End: 2022-12-20

## 2022-12-20 RX ORDER — FENTANYL CITRATE 50 UG/ML
50 INJECTION, SOLUTION INTRAMUSCULAR; INTRAVENOUS ONCE
Status: COMPLETED | OUTPATIENT
Start: 2022-12-20 | End: 2022-12-20

## 2022-12-20 RX ORDER — DEXTROSE MONOHYDRATE 25 G/50ML
25 INJECTION, SOLUTION INTRAVENOUS ONCE
Status: COMPLETED | OUTPATIENT
Start: 2022-12-20 | End: 2022-12-20

## 2022-12-20 RX ORDER — MAGNESIUM HYDROXIDE 1200 MG/15ML
LIQUID ORAL AS NEEDED
Status: DISCONTINUED | OUTPATIENT
Start: 2022-12-20 | End: 2022-12-20 | Stop reason: HOSPADM

## 2022-12-20 RX ORDER — SODIUM CHLORIDE 9 MG/ML
40 INJECTION, SOLUTION INTRAVENOUS AS NEEDED
Status: DISCONTINUED | OUTPATIENT
Start: 2022-12-20 | End: 2022-12-20 | Stop reason: HOSPADM

## 2022-12-20 RX ORDER — ONDANSETRON 2 MG/ML
INJECTION INTRAMUSCULAR; INTRAVENOUS AS NEEDED
Status: DISCONTINUED | OUTPATIENT
Start: 2022-12-20 | End: 2022-12-20 | Stop reason: SURG

## 2022-12-20 RX ORDER — NALOXONE HCL 0.4 MG/ML
0.4 VIAL (ML) INJECTION AS NEEDED
Status: DISCONTINUED | OUTPATIENT
Start: 2022-12-20 | End: 2022-12-20 | Stop reason: HOSPADM

## 2022-12-20 RX ORDER — BUPIVACAINE HYDROCHLORIDE 5 MG/ML
INJECTION, SOLUTION EPIDURAL; INTRACAUDAL
Status: COMPLETED | OUTPATIENT
Start: 2022-12-20 | End: 2022-12-20

## 2022-12-20 RX ORDER — SODIUM CHLORIDE, SODIUM LACTATE, POTASSIUM CHLORIDE, CALCIUM CHLORIDE 600; 310; 30; 20 MG/100ML; MG/100ML; MG/100ML; MG/100ML
100 INJECTION, SOLUTION INTRAVENOUS CONTINUOUS PRN
Status: DISCONTINUED | OUTPATIENT
Start: 2022-12-20 | End: 2022-12-20 | Stop reason: HOSPADM

## 2022-12-20 RX ORDER — PROPOFOL 10 MG/ML
VIAL (ML) INTRAVENOUS AS NEEDED
Status: DISCONTINUED | OUTPATIENT
Start: 2022-12-20 | End: 2022-12-20 | Stop reason: SURG

## 2022-12-20 RX ORDER — ACETAMINOPHEN 500 MG
1000 TABLET ORAL ONCE
Status: DISCONTINUED | OUTPATIENT
Start: 2022-12-20 | End: 2022-12-20 | Stop reason: HOSPADM

## 2022-12-20 RX ORDER — OXYCODONE AND ACETAMINOPHEN 10; 325 MG/1; MG/1
1 TABLET ORAL ONCE AS NEEDED
Status: COMPLETED | OUTPATIENT
Start: 2022-12-20 | End: 2022-12-20

## 2022-12-20 RX ORDER — BUPIVACAINE HCL/0.9 % NACL/PF 0.125 %
PLASTIC BAG, INJECTION (ML) EPIDURAL AS NEEDED
Status: DISCONTINUED | OUTPATIENT
Start: 2022-12-20 | End: 2022-12-20 | Stop reason: SURG

## 2022-12-20 RX ORDER — OXYCODONE AND ACETAMINOPHEN 10; 325 MG/1; MG/1
1 TABLET ORAL EVERY 6 HOURS PRN
Qty: 20 TABLET | Refills: 0 | Status: SHIPPED | OUTPATIENT
Start: 2022-12-20

## 2022-12-20 RX ORDER — SODIUM CHLORIDE, SODIUM LACTATE, POTASSIUM CHLORIDE, CALCIUM CHLORIDE 600; 310; 30; 20 MG/100ML; MG/100ML; MG/100ML; MG/100ML
100 INJECTION, SOLUTION INTRAVENOUS CONTINUOUS
Status: DISCONTINUED | OUTPATIENT
Start: 2022-12-20 | End: 2022-12-20 | Stop reason: HOSPADM

## 2022-12-20 RX ORDER — SODIUM CHLORIDE 0.9 % (FLUSH) 0.9 %
10 SYRINGE (ML) INJECTION AS NEEDED
Status: DISCONTINUED | OUTPATIENT
Start: 2022-12-20 | End: 2022-12-20 | Stop reason: HOSPADM

## 2022-12-20 RX ORDER — SODIUM CHLORIDE 0.9 % (FLUSH) 0.9 %
3 SYRINGE (ML) INJECTION EVERY 12 HOURS SCHEDULED
Status: DISCONTINUED | OUTPATIENT
Start: 2022-12-20 | End: 2022-12-20 | Stop reason: HOSPADM

## 2022-12-20 RX ADMIN — Medication 150 MCG: at 13:42

## 2022-12-20 RX ADMIN — Medication 150 MCG: at 13:15

## 2022-12-20 RX ADMIN — MIDAZOLAM 2 MG: 1 INJECTION INTRAMUSCULAR; INTRAVENOUS at 11:55

## 2022-12-20 RX ADMIN — SODIUM CHLORIDE, POTASSIUM CHLORIDE, SODIUM LACTATE AND CALCIUM CHLORIDE 100 ML/HR: 600; 310; 30; 20 INJECTION, SOLUTION INTRAVENOUS at 10:56

## 2022-12-20 RX ADMIN — Medication 100 MCG: at 13:28

## 2022-12-20 RX ADMIN — FENTANYL CITRATE 100 MCG: 50 INJECTION INTRAMUSCULAR; INTRAVENOUS at 13:50

## 2022-12-20 RX ADMIN — OXYCODONE HYDROCHLORIDE AND ACETAMINOPHEN 1 TABLET: 10; 325 TABLET ORAL at 14:36

## 2022-12-20 RX ADMIN — ROCURONIUM BROMIDE 30 MG: 10 SOLUTION INTRAVENOUS at 13:00

## 2022-12-20 RX ADMIN — GLYCOPYRROLATE 0.4 MG: 0.2 INJECTION INTRAMUSCULAR; INTRAVENOUS at 13:47

## 2022-12-20 RX ADMIN — BUPIVACAINE HYDROCHLORIDE 10 ML: 5 INJECTION, SOLUTION EPIDURAL; INTRACAUDAL; PERINEURAL at 11:57

## 2022-12-20 RX ADMIN — FENTANYL CITRATE 50 MCG: 50 INJECTION INTRAMUSCULAR; INTRAVENOUS at 11:55

## 2022-12-20 RX ADMIN — Medication 3 MG: at 13:47

## 2022-12-20 RX ADMIN — DEXTROSE MONOHYDRATE 25 ML: 25 INJECTION, SOLUTION INTRAVENOUS at 14:25

## 2022-12-20 RX ADMIN — ONDANSETRON 4 MG: 2 INJECTION INTRAMUSCULAR; INTRAVENOUS at 13:47

## 2022-12-20 RX ADMIN — PROPOFOL 120 MG: 10 INJECTION, EMULSION INTRAVENOUS at 13:00

## 2022-12-20 RX ADMIN — Medication 150 MCG: at 13:30

## 2022-12-20 RX ADMIN — BUPIVACAINE 10 ML: 13.3 INJECTION, SUSPENSION, LIPOSOMAL INFILTRATION at 11:57

## 2022-12-20 RX ADMIN — LIDOCAINE HYDROCHLORIDE 60 MG: 20 INJECTION, SOLUTION EPIDURAL; INFILTRATION; INTRACAUDAL; PERINEURAL at 13:00

## 2022-12-20 RX ADMIN — FENTANYL CITRATE 100 MCG: 50 INJECTION INTRAMUSCULAR; INTRAVENOUS at 13:00

## 2022-12-20 RX ADMIN — Medication 150 MCG: at 13:21

## 2022-12-20 NOTE — BRIEF OP NOTE
TOTAL SHOULDER REVERSE ARTHROPLASTY  Progress Note    Gabi Dye  12/20/2022    Pre-op Diagnosis:   S42.241       Post-Op Diagnosis Codes:     * 4-part fracture of surgical neck of right humerus, initial encounter for closed fracture [S42.241A]    Procedure/CPT® Codes:  MT RECONSTR TOTAL SHOULDER IMPLANT [30695]      Procedure(s):  RIGHT REVERSE TOTAL SHOULDER ARTHROPLASTY        Surgeon(s):  Wong Kulkarni MD    Anesthesia: General with Block    Staff:   Circulator: Ervin Braxton RN; Otto Lauren RN  Scrub Person: Jon Tello; Kecia Bagley; Víctor Clark         Estimated Blood Loss: <500ml    Urine Voided: * No values recorded between 12/20/2022 12:53 PM and 12/20/2022  1:58 PM *    Specimens:                None          Drains: * No LDAs found *    Findings: see op note         Complications: none          Wong Kulkarni MD     Date: 12/20/2022  Time: 14:02 CST

## 2022-12-20 NOTE — H&P
Pt Name: Gabi Dye  MRN: [id number]  YOB: 1950  Date of evaluation: 12/20/2022    H&P including current review of systems was updated in the paper chart and/or the document previously scanned into the record.  There have been no significant changes or new problems since the original evaluation.  The patient's problems continue and indications for contemplated procedure have not changed.    Electronically signed by Wong Kulkarni MD on 12/20/2022 at 09:54 CST

## 2022-12-20 NOTE — ANESTHESIA PROCEDURE NOTES
Airway  Urgency: elective    Date/Time: 12/20/2022 1:03 PM  Airway not difficult    General Information and Staff    Patient location during procedure: OR  CRNA/CAA: Simeon Regan CRNA    Indications and Patient Condition  Indications for airway management: airway protection    Preoxygenated: yes  Mask difficulty assessment: 1 - vent by mask    Final Airway Details  Final airway type: endotracheal airway      Successful airway: ETT  Cuffed: yes   Successful intubation technique: video laryngoscopy  Facilitating devices/methods: intubating stylet  Endotracheal tube insertion site: oral  Blade: Rico  Blade size: 3  ETT size (mm): 7.0  Cormack-Lehane Classification: grade I - full view of glottis  Placement verified by: capnometry   Cuff volume (mL): 5  Measured from: lips  ETT/EBT  to lips (cm): 22  Number of attempts at approach: 1  Assessment: lips, teeth, and gum same as pre-op and atraumatic intubation

## 2022-12-20 NOTE — ANESTHESIA PREPROCEDURE EVALUATION
Anesthesia Evaluation     Patient summary reviewed   no history of anesthetic complications:  NPO Solid Status: > 8 hours             Airway   Mallampati: I  TM distance: >3 FB  Neck ROM: full  No difficulty expected  Dental      Comment: Upper bridges    Pulmonary    (+) a smoker Current,   (-) asthma, sleep apnea  Cardiovascular   Exercise tolerance: good (4-7 METS)    ECG reviewed    (+) hypertension, hyperlipidemia,       Neuro/Psych- negative ROS  (-) seizures, TIA, CVA  GI/Hepatic/Renal/Endo    (+)   diabetes mellitus using insulin, thyroid problem   (-) liver disease, no renal disease    ROS Comment: S/P pancreatectomy with islet cell transplant    Musculoskeletal     Abdominal    Substance History      OB/GYN          Other                        Anesthesia Plan    ASA 3     general with block     intravenous induction     Anesthetic plan, risks, benefits, and alternatives have been provided, discussed and informed consent has been obtained with: patient.        CODE STATUS:

## 2022-12-20 NOTE — ANESTHESIA PROCEDURE NOTES
Peripheral Block    Pre-sedation assessment completed: 12/20/2022 11:54 AM    Patient reassessed immediately prior to procedure    Patient location during procedure: holding area  Start time: 12/20/2022 11:57 AM  Stop time: 12/20/2022 11:59 AM  Reason for block: procedure for pain, at surgeon's request, post-op pain management and Request by Dr. Kulkarni  Performed by  Anesthesiologist: Daphne Banegas MD  Preanesthetic Checklist  Completed: patient identified, IV checked, site marked, risks and benefits discussed, surgical consent, monitors and equipment checked, pre-op evaluation and timeout performed  Prep:  Pt Position: supine  Sterile barriers:gloves, cap, mask and washed/disinfected hands  Prep: ChloraPrep  Patient monitoring: blood pressure monitoring, continuous pulse oximetry and EKG  Procedure    Sedation: yes    Guidance:ultrasound guided and Brachial plexus identified and local anesthetic seen surrounding nerves  Images:still images obtained (picture printed and placed in patients chart)    Laterality:right  Block Type:interscalene  Injection Technique:single-shot  Needle Type:echogenic  Needle Gauge:20 G  Resistance on Injection: none    Medications Used: bupivacaine liposome (EXPAREL) 1.3 % injection - Peripheral Nerve   10 mL - 12/20/2022 11:57:00 AM  bupivacaine PF (MARCAINE) injection 0.5% - Injection   10 mL - 12/20/2022 11:57:00 AM      Post Assessment  Injection Assessment: negative aspiration for heme, no paresthesia on injection and incremental injection  Patient Tolerance:comfortable throughout block  Complications:no

## 2022-12-20 NOTE — ANESTHESIA POSTPROCEDURE EVALUATION
Patient: Gabi Dye    Procedure Summary     Date: 12/20/22 Room / Location:  [facility] OR  /  [facility]    Anesthesia Start: 1253 Anesthesia Stop: 1402    Procedure: RIGHT REVERSE TOTAL SHOULDER ARTHROPLASTY (Right: Shoulder) Diagnosis:       4-part fracture of surgical neck of right humerus, initial encounter for closed fracture      (S42.241)    Surgeons: Wong Kulkarni MD Provider: Simeon Regan CRNA    Anesthesia Type: general with block ASA Status: 3          Anesthesia Type: general with block    Vitals  Vitals Value Taken Time   /75 12/20/22 1502   Temp 97 °F (36.1 °C) 12/20/22 1500   Pulse 82 12/20/22 1502   Resp 19 12/20/22 1500   SpO2 93 % 12/20/22 1502   Vitals shown include unvalidated device data.        Post Anesthesia Care and Evaluation    Patient location during evaluation: PACU  Patient participation: complete - patient participated  Level of consciousness: awake and alert  Pain management: adequate    Airway patency: patent  Anesthetic complications: No anesthetic complications    Cardiovascular status: acceptable  Respiratory status: acceptable  Hydration status: acceptable    Comments: Blood pressure 141/73, pulse 89, temperature 97 °F (36.1 °C), temperature source Temporal, resp. rate 19, SpO2 95 %, not currently breastfeeding.    Pt discharged from PACU based on renny score >8

## 2024-09-14 ENCOUNTER — APPOINTMENT (OUTPATIENT)
Dept: CT IMAGING | Age: 74
DRG: 481 | End: 2024-09-14
Payer: MEDICARE

## 2024-09-14 ENCOUNTER — ANESTHESIA (OUTPATIENT)
Dept: OPERATING ROOM | Age: 74
End: 2024-09-14
Payer: MEDICARE

## 2024-09-14 ENCOUNTER — ANESTHESIA EVENT (OUTPATIENT)
Dept: OPERATING ROOM | Age: 74
End: 2024-09-14
Payer: MEDICARE

## 2024-09-14 ENCOUNTER — HOSPITAL ENCOUNTER (INPATIENT)
Age: 74
LOS: 4 days | Discharge: HOME | DRG: 481 | End: 2024-09-18
Attending: STUDENT IN AN ORGANIZED HEALTH CARE EDUCATION/TRAINING PROGRAM | Admitting: INTERNAL MEDICINE
Payer: MEDICARE

## 2024-09-14 ENCOUNTER — APPOINTMENT (OUTPATIENT)
Dept: GENERAL RADIOLOGY | Age: 74
DRG: 481 | End: 2024-09-14
Payer: MEDICARE

## 2024-09-14 DIAGNOSIS — R07.9 CHEST PAIN, UNSPECIFIED TYPE: ICD-10-CM

## 2024-09-14 DIAGNOSIS — R55 SYNCOPE: ICD-10-CM

## 2024-09-14 DIAGNOSIS — R79.89 ELEVATED D-DIMER: ICD-10-CM

## 2024-09-14 DIAGNOSIS — R55 SYNCOPE AND COLLAPSE: Primary | ICD-10-CM

## 2024-09-14 DIAGNOSIS — S72.001A NONDISPLACED FRACTURE OF NECK OF RIGHT FEMUR (HCC): ICD-10-CM

## 2024-09-14 DIAGNOSIS — I82.451 ACUTE DEEP VEIN THROMBOSIS (DVT) OF RIGHT PERONEAL VEIN (HCC): ICD-10-CM

## 2024-09-14 DIAGNOSIS — R55 SYNCOPE, UNSPECIFIED SYNCOPE TYPE: ICD-10-CM

## 2024-09-14 PROBLEM — E11.9 DIABETES (HCC): Status: ACTIVE | Noted: 2024-09-14

## 2024-09-14 PROBLEM — E03.9 HYPOTHYROIDISM: Status: ACTIVE | Noted: 2024-09-14

## 2024-09-14 PROBLEM — I10 HYPERTENSION: Status: ACTIVE | Noted: 2024-09-14

## 2024-09-14 LAB
ANION GAP SERPL CALCULATED.3IONS-SCNC: 18 MMOL/L (ref 7–19)
BASOPHILS # BLD: 0.2 K/UL (ref 0–0.2)
BASOPHILS NFR BLD: 1.2 % (ref 0–1)
BUN SERPL-MCNC: 12 MG/DL (ref 8–23)
CALCIUM SERPL-MCNC: 8 MG/DL (ref 8.8–10.2)
CHLORIDE SERPL-SCNC: 102 MMOL/L (ref 98–111)
CO2 SERPL-SCNC: 19 MMOL/L (ref 22–29)
CREAT SERPL-MCNC: 0.8 MG/DL (ref 0.5–0.9)
D DIMER PPP FEU-MCNC: >20 UG/ML FEU (ref 0–0.48)
EOSINOPHIL # BLD: 0 K/UL (ref 0–0.6)
EOSINOPHIL NFR BLD: 0.1 % (ref 0–5)
ERYTHROCYTE [DISTWIDTH] IN BLOOD BY AUTOMATED COUNT: 13.8 % (ref 11.5–14.5)
GLUCOSE BLD-MCNC: 109 MG/DL (ref 70–99)
GLUCOSE SERPL-MCNC: 115 MG/DL (ref 70–99)
HBA1C MFR BLD: 6.4 % (ref 4–5.6)
HCT VFR BLD AUTO: 43.8 % (ref 37–47)
HGB BLD-MCNC: 14.1 G/DL (ref 12–16)
IMM GRANULOCYTES # BLD: 0 K/UL
LYMPHOCYTES # BLD: 1.6 K/UL (ref 1.1–4.5)
LYMPHOCYTES NFR BLD: 10.7 % (ref 20–40)
MAGNESIUM SERPL-MCNC: 1.7 MG/DL (ref 1.6–2.4)
MCH RBC QN AUTO: 31.5 PG (ref 27–31)
MCHC RBC AUTO-ENTMCNC: 32.2 G/DL (ref 33–37)
MCV RBC AUTO: 98 FL (ref 81–99)
MONOCYTES # BLD: 0.9 K/UL (ref 0–0.9)
MONOCYTES NFR BLD: 6.1 % (ref 0–10)
NEUTROPHILS # BLD: 12 K/UL (ref 1.5–7.5)
NEUTS SEG NFR BLD: 81.7 % (ref 50–65)
PERFORMED ON: ABNORMAL
PLATELET # BLD AUTO: 406 K/UL (ref 130–400)
PMV BLD AUTO: 8.8 FL (ref 9.4–12.3)
POTASSIUM SERPL-SCNC: 4.3 MMOL/L (ref 3.5–5)
RBC # BLD AUTO: 4.47 M/UL (ref 4.2–5.4)
SODIUM SERPL-SCNC: 139 MMOL/L (ref 136–145)
TROPONIN, HIGH SENSITIVITY: 20 NG/L (ref 0–14)
TROPONIN, HIGH SENSITIVITY: 20 NG/L (ref 0–14)
TROPONIN, HIGH SENSITIVITY: 26 NG/L (ref 0–14)
TROPONIN, HIGH SENSITIVITY: 32 NG/L (ref 0–14)
TSH SERPL DL<=0.005 MIU/L-ACNC: 0.48 UIU/ML (ref 0.27–4.2)
WBC # BLD AUTO: 14.7 K/UL (ref 4.8–10.8)

## 2024-09-14 PROCEDURE — 72128 CT CHEST SPINE W/O DYE: CPT

## 2024-09-14 PROCEDURE — 84484 ASSAY OF TROPONIN QUANT: CPT

## 2024-09-14 PROCEDURE — 83735 ASSAY OF MAGNESIUM: CPT

## 2024-09-14 PROCEDURE — 73700 CT LOWER EXTREMITY W/O DYE: CPT

## 2024-09-14 PROCEDURE — 72131 CT LUMBAR SPINE W/O DYE: CPT

## 2024-09-14 PROCEDURE — 71275 CT ANGIOGRAPHY CHEST: CPT

## 2024-09-14 PROCEDURE — 36415 COLL VENOUS BLD VENIPUNCTURE: CPT

## 2024-09-14 PROCEDURE — 6360000002 HC RX W HCPCS: Performed by: NURSE PRACTITIONER

## 2024-09-14 PROCEDURE — 2580000003 HC RX 258: Performed by: NURSE PRACTITIONER

## 2024-09-14 PROCEDURE — 6370000000 HC RX 637 (ALT 250 FOR IP): Performed by: STUDENT IN AN ORGANIZED HEALTH CARE EDUCATION/TRAINING PROGRAM

## 2024-09-14 PROCEDURE — 73552 X-RAY EXAM OF FEMUR 2/>: CPT

## 2024-09-14 PROCEDURE — 85025 COMPLETE CBC W/AUTO DIFF WBC: CPT

## 2024-09-14 PROCEDURE — 6360000002 HC RX W HCPCS: Performed by: STUDENT IN AN ORGANIZED HEALTH CARE EDUCATION/TRAINING PROGRAM

## 2024-09-14 PROCEDURE — 84443 ASSAY THYROID STIM HORMONE: CPT

## 2024-09-14 PROCEDURE — 93005 ELECTROCARDIOGRAM TRACING: CPT | Performed by: STUDENT IN AN ORGANIZED HEALTH CARE EDUCATION/TRAINING PROGRAM

## 2024-09-14 PROCEDURE — 99285 EMERGENCY DEPT VISIT HI MDM: CPT

## 2024-09-14 PROCEDURE — 83036 HEMOGLOBIN GLYCOSYLATED A1C: CPT

## 2024-09-14 PROCEDURE — 96375 TX/PRO/DX INJ NEW DRUG ADDON: CPT

## 2024-09-14 PROCEDURE — 6360000004 HC RX CONTRAST MEDICATION: Performed by: STUDENT IN AN ORGANIZED HEALTH CARE EDUCATION/TRAINING PROGRAM

## 2024-09-14 PROCEDURE — 80048 BASIC METABOLIC PNL TOTAL CA: CPT

## 2024-09-14 PROCEDURE — 82962 GLUCOSE BLOOD TEST: CPT

## 2024-09-14 PROCEDURE — 72125 CT NECK SPINE W/O DYE: CPT

## 2024-09-14 PROCEDURE — 70450 CT HEAD/BRAIN W/O DYE: CPT

## 2024-09-14 PROCEDURE — 85379 FIBRIN DEGRADATION QUANT: CPT

## 2024-09-14 PROCEDURE — 72170 X-RAY EXAM OF PELVIS: CPT

## 2024-09-14 PROCEDURE — 96374 THER/PROPH/DIAG INJ IV PUSH: CPT

## 2024-09-14 PROCEDURE — 2140000000 HC CCU INTERMEDIATE R&B

## 2024-09-14 RX ORDER — INSULIN LISPRO 100 [IU]/ML
0-4 INJECTION, SOLUTION INTRAVENOUS; SUBCUTANEOUS NIGHTLY
Status: DISCONTINUED | OUTPATIENT
Start: 2024-09-14 | End: 2024-09-18 | Stop reason: HOSPADM

## 2024-09-14 RX ORDER — OXYCODONE AND ACETAMINOPHEN 7.5; 325 MG/1; MG/1
1 TABLET ORAL ONCE
Status: COMPLETED | OUTPATIENT
Start: 2024-09-14 | End: 2024-09-14

## 2024-09-14 RX ORDER — SODIUM CHLORIDE 9 MG/ML
INJECTION, SOLUTION INTRAVENOUS PRN
Status: DISCONTINUED | OUTPATIENT
Start: 2024-09-14 | End: 2024-09-15 | Stop reason: SDUPTHER

## 2024-09-14 RX ORDER — ONDANSETRON 4 MG/1
4 TABLET, ORALLY DISINTEGRATING ORAL EVERY 8 HOURS PRN
Status: DISCONTINUED | OUTPATIENT
Start: 2024-09-14 | End: 2024-09-18 | Stop reason: HOSPADM

## 2024-09-14 RX ORDER — SODIUM CHLORIDE 0.9 % (FLUSH) 0.9 %
5-40 SYRINGE (ML) INJECTION PRN
Status: DISCONTINUED | OUTPATIENT
Start: 2024-09-14 | End: 2024-09-15 | Stop reason: SDUPTHER

## 2024-09-14 RX ORDER — MAGNESIUM SULFATE IN WATER 40 MG/ML
2000 INJECTION, SOLUTION INTRAVENOUS PRN
Status: DISCONTINUED | OUTPATIENT
Start: 2024-09-14 | End: 2024-09-18 | Stop reason: HOSPADM

## 2024-09-14 RX ORDER — SODIUM CHLORIDE 0.9 % (FLUSH) 0.9 %
5-40 SYRINGE (ML) INJECTION EVERY 12 HOURS SCHEDULED
Status: DISCONTINUED | OUTPATIENT
Start: 2024-09-14 | End: 2024-09-15 | Stop reason: SDUPTHER

## 2024-09-14 RX ORDER — HYDROMORPHONE HYDROCHLORIDE 1 MG/ML
0.5 INJECTION, SOLUTION INTRAMUSCULAR; INTRAVENOUS; SUBCUTANEOUS
Status: DISCONTINUED | OUTPATIENT
Start: 2024-09-14 | End: 2024-09-17

## 2024-09-14 RX ORDER — POLYETHYLENE GLYCOL 3350 17 G/17G
17 POWDER, FOR SOLUTION ORAL DAILY PRN
Status: DISCONTINUED | OUTPATIENT
Start: 2024-09-14 | End: 2024-09-18 | Stop reason: HOSPADM

## 2024-09-14 RX ORDER — POTASSIUM CHLORIDE 7.45 MG/ML
10 INJECTION INTRAVENOUS PRN
Status: DISCONTINUED | OUTPATIENT
Start: 2024-09-14 | End: 2024-09-18 | Stop reason: HOSPADM

## 2024-09-14 RX ORDER — FENTANYL CITRATE 50 UG/ML
50 INJECTION, SOLUTION INTRAMUSCULAR; INTRAVENOUS ONCE
Status: COMPLETED | OUTPATIENT
Start: 2024-09-14 | End: 2024-09-14

## 2024-09-14 RX ORDER — ONDANSETRON 2 MG/ML
4 INJECTION INTRAMUSCULAR; INTRAVENOUS EVERY 6 HOURS PRN
Status: DISCONTINUED | OUTPATIENT
Start: 2024-09-14 | End: 2024-09-18 | Stop reason: HOSPADM

## 2024-09-14 RX ORDER — ACETAMINOPHEN 325 MG/1
650 TABLET ORAL EVERY 6 HOURS PRN
Status: DISCONTINUED | OUTPATIENT
Start: 2024-09-14 | End: 2024-09-18 | Stop reason: HOSPADM

## 2024-09-14 RX ORDER — ACETAMINOPHEN 650 MG/1
650 SUPPOSITORY RECTAL EVERY 6 HOURS PRN
Status: DISCONTINUED | OUTPATIENT
Start: 2024-09-14 | End: 2024-09-18 | Stop reason: HOSPADM

## 2024-09-14 RX ORDER — INSULIN LISPRO 100 [IU]/ML
0-4 INJECTION, SOLUTION INTRAVENOUS; SUBCUTANEOUS
Status: DISCONTINUED | OUTPATIENT
Start: 2024-09-14 | End: 2024-09-18 | Stop reason: HOSPADM

## 2024-09-14 RX ORDER — MORPHINE SULFATE 4 MG/ML
4 INJECTION, SOLUTION INTRAMUSCULAR; INTRAVENOUS ONCE
Status: COMPLETED | OUTPATIENT
Start: 2024-09-14 | End: 2024-09-14

## 2024-09-14 RX ORDER — IOPAMIDOL 755 MG/ML
75 INJECTION, SOLUTION INTRAVASCULAR
Status: COMPLETED | OUTPATIENT
Start: 2024-09-14 | End: 2024-09-14

## 2024-09-14 RX ORDER — DEXTROSE MONOHYDRATE 100 MG/ML
INJECTION, SOLUTION INTRAVENOUS CONTINUOUS PRN
Status: DISCONTINUED | OUTPATIENT
Start: 2024-09-14 | End: 2024-09-18 | Stop reason: HOSPADM

## 2024-09-14 RX ORDER — NALOXONE HYDROCHLORIDE 0.4 MG/ML
0.4 INJECTION, SOLUTION INTRAMUSCULAR; INTRAVENOUS; SUBCUTANEOUS PRN
Status: DISCONTINUED | OUTPATIENT
Start: 2024-09-14 | End: 2024-09-18 | Stop reason: HOSPADM

## 2024-09-14 RX ORDER — HYDROMORPHONE HYDROCHLORIDE 1 MG/ML
0.5 INJECTION, SOLUTION INTRAMUSCULAR; INTRAVENOUS; SUBCUTANEOUS ONCE
Status: COMPLETED | OUTPATIENT
Start: 2024-09-14 | End: 2024-09-14

## 2024-09-14 RX ORDER — HYDROMORPHONE HYDROCHLORIDE 1 MG/ML
1 INJECTION, SOLUTION INTRAMUSCULAR; INTRAVENOUS; SUBCUTANEOUS
Status: DISCONTINUED | OUTPATIENT
Start: 2024-09-14 | End: 2024-09-17

## 2024-09-14 RX ORDER — SODIUM CHLORIDE 9 MG/ML
INJECTION, SOLUTION INTRAVENOUS CONTINUOUS
Status: ACTIVE | OUTPATIENT
Start: 2024-09-14 | End: 2024-09-16

## 2024-09-14 RX ORDER — POTASSIUM CHLORIDE 1500 MG/1
40 TABLET, EXTENDED RELEASE ORAL PRN
Status: DISCONTINUED | OUTPATIENT
Start: 2024-09-14 | End: 2024-09-18 | Stop reason: HOSPADM

## 2024-09-14 RX ADMIN — FENTANYL CITRATE 50 MCG: 50 INJECTION INTRAMUSCULAR; INTRAVENOUS at 13:56

## 2024-09-14 RX ADMIN — HYDROMORPHONE HYDROCHLORIDE 0.5 MG: 1 INJECTION, SOLUTION INTRAMUSCULAR; INTRAVENOUS; SUBCUTANEOUS at 19:56

## 2024-09-14 RX ADMIN — OXYCODONE HYDROCHLORIDE AND ACETAMINOPHEN 1 TABLET: 7.5; 325 TABLET ORAL at 17:03

## 2024-09-14 RX ADMIN — IOPAMIDOL 75 ML: 755 INJECTION, SOLUTION INTRAVENOUS at 15:17

## 2024-09-14 RX ADMIN — MORPHINE SULFATE 4 MG: 4 INJECTION, SOLUTION INTRAMUSCULAR; INTRAVENOUS at 16:14

## 2024-09-14 RX ADMIN — ONDANSETRON 4 MG: 2 INJECTION INTRAMUSCULAR; INTRAVENOUS at 21:00

## 2024-09-14 RX ADMIN — SODIUM CHLORIDE: 9 INJECTION, SOLUTION INTRAVENOUS at 22:04

## 2024-09-14 RX ADMIN — HYDROMORPHONE HYDROCHLORIDE 0.5 MG: 1 INJECTION, SOLUTION INTRAMUSCULAR; INTRAVENOUS; SUBCUTANEOUS at 21:56

## 2024-09-14 ASSESSMENT — PAIN DESCRIPTION - ORIENTATION
ORIENTATION: RIGHT

## 2024-09-14 ASSESSMENT — PAIN DESCRIPTION - DESCRIPTORS
DESCRIPTORS: SHARP;SHOOTING
DESCRIPTORS: SPASM;ACHING;THROBBING

## 2024-09-14 ASSESSMENT — PAIN SCALES - GENERAL
PAINLEVEL_OUTOF10: 10
PAINLEVEL_OUTOF10: 8
PAINLEVEL_OUTOF10: 8
PAINLEVEL_OUTOF10: 9
PAINLEVEL_OUTOF10: 10

## 2024-09-14 ASSESSMENT — PAIN DESCRIPTION - LOCATION
LOCATION: HIP

## 2024-09-14 ASSESSMENT — LIFESTYLE VARIABLES: HOW OFTEN DO YOU HAVE A DRINK CONTAINING ALCOHOL: NEVER

## 2024-09-14 ASSESSMENT — PAIN DESCRIPTION - PAIN TYPE: TYPE: ACUTE PAIN

## 2024-09-14 ASSESSMENT — PAIN - FUNCTIONAL ASSESSMENT: PAIN_FUNCTIONAL_ASSESSMENT: 0-10

## 2024-09-14 NOTE — ED PROVIDER NOTES
[facility] EMERGENCY DEPT  eMERGENCY dEPARTMENT eNCOUnter      Pt Name: Jacqueline Richardson  MRN: [id number]  Birthdate 1950  Date of evaluation: 9/14/2024  Provider: Shamar Ashton MD    Chief Complaint:  Chief Complaint   Patient presents with    Fall     Pt arrives from home via EMS.  called due to finding patient passed out on floor. Pt received covid vaccine Thursday, and has felt terrible since. Pt does not remember falling.     Hip Pain     Pt states she is unable to straighten right hip due to pain, also hurting in the left rib area.      HPI    Jacqueline Richardson is a 74 y.o. female who presents to the emergency department due to fall and injury.  She does not recall the event.  She says the last thing that she remembers is going to bed on the couch.  Her  heard her fall and found her several steps from the couch by the door.  He says that she was unconscious.  He estimates for minutes.  He was able to get her to wake up.  She then woke up and return to normal consciousness.  She has right hip pain left rib pain was unable to get up off the floor.  She had a COVID-vaccine 2 days ago and has been feeling somewhat ill since then.  She denies chest pain other than the rib pain, shortness of breath, palpitations, nausea, vomiting, numbness, weakness.  Does not recall the event at all.  Denies headache or visual disturbance.  No history of DVT.  No pain or swelling in the legs.  No recent immobilization surgeries or Travel.    NursingNotes were reviewed.    Review of Systems   Constitutional:  Negative for chills and fever.   Respiratory:  Negative for shortness of breath.    Cardiovascular:  Negative for chest pain and leg swelling.   Gastrointestinal:  Negative for nausea and vomiting.   Genitourinary:  Negative for difficulty urinating and dysuria.   Neurological:  Positive for syncope. Negative for light-headedness.       No past medical history on file.    No past surgical history on file.    Previous

## 2024-09-14 NOTE — ED NOTES
ED TO INPATIENT SBAR HANDOFF    Patient Name: Jacqueline Richardson   : 1950  74 y.o.   Family/Caregiver Present: No  Code Status Order: Full Code    C-SSRS: Risk of Suicide: No Risk  Sitter No  Restraints:         Situation  Chief Complaint:   Chief Complaint   Patient presents with    Fall     Pt arrives from home via EMS.  called due to finding patient passed out on floor. Pt received covid vaccine Thursday, and has felt terrible since. Pt does not remember falling.     Hip Pain     Pt states she is unable to straighten right hip due to pain, also hurting in the left rib area.      Patient Diagnosis: Syncope and collapse [R55]     Brief Description of Patient's Condition: pt fell at home, experienced LOC,  found her. Pt complaining of not being able to straighten right leg out due to pain. Unable to note shortening or rotation. Painful to palpate. Right femoral neck fracture noted.   Mental Status: oriented and alert  Arrived from: home    Imaging:   CT HIP RIGHT WO CONTRAST   Final Result       Nondisplaced subcapital right femoral neck fracture.        All CT scans are performed using dose optimization techniques as appropriate to the performed exam and include    at least one of the following: Automated exposure control, adjustment of the mA and/or kV according to size, and the use of iterative reconstruction technique.        ______________________________________    Electronically signed by: YAHIR RAYMOND M.D.   Date:     2024   Time:    17:44       CTA PULMONARY W CONTRAST   Final Result   1.  No evidence of pulmonary artery embolism.   2.  No acute abnormality of the chest.   3.  Mild pneumobilia.   4.  Nonobstructing left renal calculi.        All CT scans are performed using dose optimization techniques as appropriate to the performed exam and include    at least one of the following: Automated exposure control, adjustment of the mA and/or kV according to size, and the use of  Multilevel foraminal narrowing as detailed above.        All CT scans are performed using dose optimization techniques as appropriate to the performed exam and include    at least one of the following: Automated exposure control, adjustment of the mA and/or kV according to size, and the use of iterative reconstruction technique.        ______________________________________    Electronically signed by: JOB DIAZ M.D.   Date:     09/14/2024   Time:    15:34       CT HEAD WO CONTRAST   Final Result   No acute intracranial process.       Mild atrophy.        All CT scans are performed using dose optimization techniques as appropriate to the performed exam and include    at least one of the following: Automated exposure control, adjustment of the mA and/or kV according to size, and the use of iterative reconstruction technique.        ______________________________________    Electronically signed by: SCOTT LOWE M.D.   Date:     09/14/2024   Time:    15:34       XR FEMUR RIGHT (MIN 2 VIEWS)   Final Result   FINDINGS/Impression:   Cortical irregularity involving the sub capital region of the right femoral neck, concerning for an acute nondisplaced fracture.  Alignment is maintained.  The joint spaces are narrowed.  Decreased mineralization.           ______________________________________    Electronically signed by: SCOTT LOWE M.D.   Date:     09/14/2024   Time:    15:04       XR PELVIS (1-2 VIEWS)   Final Result   1.  Possible nondisplaced subcapital fracture of the right hip.  Correlation with CT scan or MRI advised.   2.  Degenerative changes.   3.  Otherwise unremarkable image of the pelvis.                   ______________________________________    Electronically signed by: MYRIAM KEE M.D.   Date:     09/14/2024   Time:    14:58         COVID-19 Results:   Internal Administration   First Dose COVID-19, MODERNA BLUE border, Primary or Immunocompromised, (age 12y+), IM, 100 mcg/0.5mL  02/22/2021

## 2024-09-14 NOTE — H&P
[facility]      Hospitalist - History & Physical      PCP: Jodi Fine, APRN - CNP    Date of Admission: 9/14/2024    Date of Service: 9/14/2024    Chief Complaint:  Syncope and right hip injury     History Of Present Illness:   The patient is a 74 y.o. female who presented to [facility] ED for evaluation of right hip injury following syncope at home. Pt has history of diabetes, hypertension, hypothyroidism and pancreatitis.    Pt is here with her  who relates that patient got up walked toward there carport door and collapsed. Pt doesn't recall events leading up to syncope. She denies problems with shortness of breath, syncope as well as history of pe/dvt. She received covid vaccination two days ago and relates she has had general malaise since.     In ED, consult placed to Dr. Bowen with plan for CT hip. CTA pulmonary-No evidence of pulmonary artery embolism. EKG ST hr 115b/min, troponin 20, ddimer >20, sodium 139, potassium 4.3, creatinine 0.8/bun 12, glucose 115, wbc 14.7, hgb 14.1, platelets 406k. Imaging of right hip-Cortical irregularity involving the sub capital region of the right femoral neck, concerning for an acute nondisplaced fracture. Pelvis-Possible nondisplaced subcapital fracture of the right hip.    Past Medical History:    Diabetes  Hypertension  Hypothyroidism   Pancreatitis     Past Surgical History:    Pancreatectomy  Splenectomy   Cholecystectomy  Back surgery   Mastectomy     Home Medications:  Prior to Admission medications    Not on File       Allergies:    Compazine [prochlorperazine] and Bacitracin-pramoxine hcl    Social History:    The patient currently lives at home  Tobacco:   has no history on file for tobacco use.  Alcohol:   has no history on file for alcohol use.  Illicit Drugs: none    Family History:  No family history on file.    Review of Systems:   Review of Systems   Musculoskeletal:  Positive for arthralgias (right hip).   Neurological:  Positive for syncope.  hip fracture (HCC)    Diabetes (HCC)    Hypertension    Hypothyroidism  Resolved Problems:    * No resolved hospital problems. *     Active Problems:    Syncope/Elevated troponin  -trend troponin  -echocardiogram  -telemetry  -us bilateral carotid arteries  -consider cards consult    Closed right hip fracture (HCC)  -pain control  -narcan 0.4mg iv prn  -bedrest  -npo after midnight  -ns  at 75cc/hr  -callahan catheter care  -scds for vte prophylaxis    Elevated ddimer   -venous doppler US bilateral lower extremities    Diabetes (HCC)  -HgA1c  -poc glucose qid  -low dose insulin coverage  -hypoglycemia orders    Hypertension  -monitor blood pressure  -continue antihypertensive meds  -avoid hypotension    Hypothyroidism  -tsh with reflex FT4  Resolved Problems:    * No resolved hospital problems. *  Signed:  ANKUR Giraldo - CNP, 9/14/2024 5:34 PM

## 2024-09-15 ENCOUNTER — APPOINTMENT (OUTPATIENT)
Dept: GENERAL RADIOLOGY | Age: 74
DRG: 481 | End: 2024-09-15
Payer: MEDICARE

## 2024-09-15 ENCOUNTER — APPOINTMENT (OUTPATIENT)
Age: 74
DRG: 481 | End: 2024-09-15
Payer: MEDICARE

## 2024-09-15 LAB
ALBUMIN SERPL-MCNC: 3.4 G/DL (ref 3.5–5.2)
ALP SERPL-CCNC: 186 U/L (ref 35–104)
ALT SERPL-CCNC: 26 U/L (ref 5–33)
ANION GAP SERPL CALCULATED.3IONS-SCNC: 14 MMOL/L (ref 7–19)
AST SERPL-CCNC: 39 U/L (ref 5–32)
BASOPHILS # BLD: 0.2 K/UL (ref 0–0.2)
BASOPHILS NFR BLD: 1.6 % (ref 0–1)
BILIRUB DIRECT SERPL-MCNC: 0.3 MG/DL (ref 0–0.3)
BILIRUB INDIRECT SERPL-MCNC: 0.4 MG/DL (ref 0–1)
BILIRUB SERPL-MCNC: 0.7 MG/DL (ref 0.2–1.2)
BUN SERPL-MCNC: 14 MG/DL (ref 8–23)
CALCIUM SERPL-MCNC: 7.8 MG/DL (ref 8.8–10.2)
CHLORIDE SERPL-SCNC: 99 MMOL/L (ref 98–111)
CO2 SERPL-SCNC: 21 MMOL/L (ref 22–29)
CREAT SERPL-MCNC: 0.8 MG/DL (ref 0.5–0.9)
ECHO AO ASC DIAM: 3.1 CM
ECHO AO ASCENDING AORTA INDEX: 1.93 CM/M2
ECHO AO ROOT DIAM: 2.1 CM
ECHO AO ROOT INDEX: 1.3 CM/M2
ECHO AO SINUS VALSALVA DIAM: 2.7 CM
ECHO AO SINUS VALSALVA INDEX: 1.68 CM/M2
ECHO AO ST JNCT DIAM: 2.4 CM
ECHO AV AREA PEAK VELOCITY: 2.2 CM2
ECHO AV AREA VTI: 2.4 CM2
ECHO AV AREA/BSA PEAK VELOCITY: 1.4 CM2/M2
ECHO AV AREA/BSA VTI: 1.5 CM2/M2
ECHO AV MEAN GRADIENT: 7 MMHG
ECHO AV MEAN VELOCITY: 1.2 M/S
ECHO AV PEAK GRADIENT: 13 MMHG
ECHO AV PEAK VELOCITY: 1.8 M/S
ECHO AV VELOCITY RATIO: 0.72
ECHO AV VTI: 33.4 CM
ECHO BSA: 1.61 M2
ECHO EST RA PRESSURE: 3 MMHG
ECHO IVC PROX: 1.4 CM
ECHO LA AREA 2C: 20.9 CM2
ECHO LA AREA 4C: 20.1 CM2
ECHO LA DIAMETER INDEX: 1.74 CM/M2
ECHO LA DIAMETER: 2.8 CM
ECHO LA MAJOR AXIS: 6.3 CM
ECHO LA MINOR AXIS: 5.5 CM
ECHO LA TO AORTIC ROOT RATIO: 1.33
ECHO LA VOL BP: 63 ML (ref 22–52)
ECHO LA VOL MOD A2C: 65 ML (ref 22–52)
ECHO LA VOL MOD A4C: 56 ML (ref 22–52)
ECHO LA VOL/BSA BIPLANE: 39 ML/M2 (ref 16–34)
ECHO LA VOLUME INDEX MOD A2C: 40 ML/M2 (ref 16–34)
ECHO LA VOLUME INDEX MOD A4C: 35 ML/M2 (ref 16–34)
ECHO LV E' LATERAL VELOCITY: 9 CM/S
ECHO LV E' SEPTAL VELOCITY: 9 CM/S
ECHO LV EDV A2C: 74 ML
ECHO LV EDV A4C: 60 ML
ECHO LV EDV INDEX A4C: 37 ML/M2
ECHO LV EDV NDEX A2C: 46 ML/M2
ECHO LV EJECTION FRACTION A2C: 61 %
ECHO LV EJECTION FRACTION A4C: 61 %
ECHO LV EJECTION FRACTION BIPLANE: 60 % (ref 55–100)
ECHO LV ESV A2C: 29 ML
ECHO LV ESV A4C: 23 ML
ECHO LV ESV INDEX A2C: 18 ML/M2
ECHO LV ESV INDEX A4C: 14 ML/M2
ECHO LV FRACTIONAL SHORTENING: 44 % (ref 28–44)
ECHO LV INTERNAL DIMENSION DIASTOLE INDEX: 2.42 CM/M2
ECHO LV INTERNAL DIMENSION DIASTOLIC: 3.9 CM (ref 3.9–5.3)
ECHO LV INTERNAL DIMENSION SYSTOLIC INDEX: 1.37 CM/M2
ECHO LV INTERNAL DIMENSION SYSTOLIC: 2.2 CM
ECHO LV ISOVOLUMETRIC RELAXATION TIME (IVRT): 92 MS
ECHO LV IVSD: 1.1 CM (ref 0.6–0.9)
ECHO LV MASS 2D: 159.3 G (ref 67–162)
ECHO LV MASS INDEX 2D: 98.9 G/M2 (ref 43–95)
ECHO LV POSTERIOR WALL DIASTOLIC: 1.3 CM (ref 0.6–0.9)
ECHO LV RELATIVE WALL THICKNESS RATIO: 0.67
ECHO LVOT AREA: 3.1 CM2
ECHO LVOT AV VTI INDEX: 0.75
ECHO LVOT DIAM: 2 CM
ECHO LVOT MEAN GRADIENT: 3 MMHG
ECHO LVOT PEAK GRADIENT: 7 MMHG
ECHO LVOT PEAK VELOCITY: 1.3 M/S
ECHO LVOT STROKE VOLUME INDEX: 48.8 ML/M2
ECHO LVOT SV: 78.5 ML
ECHO LVOT VTI: 25 CM
ECHO MV A VELOCITY: 1.31 M/S
ECHO MV ANNULUS DIAMETER: 2.6 CM
ECHO MV AREA VTI: 3.2 CM2
ECHO MV E DECELERATION TIME (DT): 268 MS
ECHO MV E VELOCITY: 1.06 M/S
ECHO MV E/A RATIO: 0.81
ECHO MV E/E' LATERAL: 11.78
ECHO MV E/E' RATIO (AVERAGED): 11.78
ECHO MV E/E' SEPTAL: 11.78
ECHO MV LVOT VTI INDEX: 0.99
ECHO MV MAX VELOCITY: 1.3 M/S
ECHO MV MEAN GRADIENT: 4 MMHG
ECHO MV MEAN VELOCITY: 0.9 M/S
ECHO MV PEAK GRADIENT: 7 MMHG
ECHO MV VTI: 24.8 CM
ECHO RA AREA 4C: 9.2 CM2
ECHO RA END SYSTOLIC VOLUME APICAL 4 CHAMBER INDEX BSA: 11 ML/M2
ECHO RA MAJOR AXIS INDEX: 2.48 CM/M2
ECHO RA MAJOR AXIS: 4 CM
ECHO RA MINOR AXIS INDEX: 1.37 CM/M2
ECHO RA MINOR AXIS: 2.2 CM
ECHO RA VOLUME: 17 ML
ECHO RIGHT VENTRICULAR SYSTOLIC PRESSURE (RVSP): 43 MMHG
ECHO RV BASAL DIMENSION: 1.9 CM
ECHO RV INTERNAL DIMENSION: 2.5 CM
ECHO RV LONGITUDINAL DIMENSION: 7.1 CM
ECHO RV MID DIMENSION: 3 CM
ECHO RV TAPSE: 1.8 CM (ref 1.7–?)
ECHO TV REGURGITANT MAX VELOCITY: 3.15 M/S
ECHO TV REGURGITANT PEAK GRADIENT: 40 MMHG
EKG P AXIS: 54 DEGREES
EKG P AXIS: 65 DEGREES
EKG P-R INTERVAL: 162 MS
EKG P-R INTERVAL: 172 MS
EKG Q-T INTERVAL: 322 MS
EKG Q-T INTERVAL: 326 MS
EKG QRS DURATION: 102 MS
EKG QRS DURATION: 102 MS
EKG QTC CALCULATION (BAZETT): 411 MS
EKG QTC CALCULATION (BAZETT): 422 MS
EKG T AXIS: 85 DEGREES
EKG T AXIS: 87 DEGREES
EOSINOPHIL # BLD: 0.1 K/UL (ref 0–0.6)
EOSINOPHIL NFR BLD: 0.7 % (ref 0–5)
ERYTHROCYTE [DISTWIDTH] IN BLOOD BY AUTOMATED COUNT: 13.7 % (ref 11.5–14.5)
GLUCOSE BLD-MCNC: 245 MG/DL (ref 70–99)
GLUCOSE BLD-MCNC: 280 MG/DL (ref 70–99)
GLUCOSE BLD-MCNC: 281 MG/DL (ref 70–99)
GLUCOSE BLD-MCNC: 300 MG/DL (ref 70–99)
GLUCOSE SERPL-MCNC: 246 MG/DL (ref 70–99)
HCT VFR BLD AUTO: 38.4 % (ref 37–47)
HGB BLD-MCNC: 12.5 G/DL (ref 12–16)
IMM GRANULOCYTES # BLD: 0 K/UL
INR PPP: 1.06 (ref 0.88–1.18)
LYMPHOCYTES # BLD: 2.1 K/UL (ref 1.1–4.5)
LYMPHOCYTES NFR BLD: 18.3 % (ref 20–40)
MCH RBC QN AUTO: 32.5 PG (ref 27–31)
MCHC RBC AUTO-ENTMCNC: 32.6 G/DL (ref 33–37)
MCV RBC AUTO: 99.7 FL (ref 81–99)
MONOCYTES # BLD: 1.6 K/UL (ref 0–0.9)
MONOCYTES NFR BLD: 13.7 % (ref 0–10)
NEUTROPHILS # BLD: 7.5 K/UL (ref 1.5–7.5)
NEUTS SEG NFR BLD: 65.4 % (ref 50–65)
PERFORMED ON: ABNORMAL
PHOSPHATE SERPL-MCNC: 3.2 MG/DL (ref 2.5–4.5)
PLATELET # BLD AUTO: 337 K/UL (ref 130–400)
PMV BLD AUTO: 10 FL (ref 9.4–12.3)
POTASSIUM SERPL-SCNC: 4.3 MMOL/L (ref 3.5–5)
PROT SERPL-MCNC: 6.1 G/DL (ref 6.4–8.3)
PROTHROMBIN TIME: 13.5 SEC (ref 12–14.6)
RBC # BLD AUTO: 3.85 M/UL (ref 4.2–5.4)
SODIUM SERPL-SCNC: 134 MMOL/L (ref 136–145)
TROPONIN, HIGH SENSITIVITY: 42 NG/L (ref 0–14)
WBC # BLD AUTO: 11.5 K/UL (ref 4.8–10.8)

## 2024-09-15 PROCEDURE — 85025 COMPLETE CBC W/AUTO DIFF WBC: CPT

## 2024-09-15 PROCEDURE — 3700000000 HC ANESTHESIA ATTENDED CARE: Performed by: ORTHOPAEDIC SURGERY

## 2024-09-15 PROCEDURE — 2720000010 HC SURG SUPPLY STERILE: Performed by: ORTHOPAEDIC SURGERY

## 2024-09-15 PROCEDURE — 6370000000 HC RX 637 (ALT 250 FOR IP): Performed by: ORTHOPAEDIC SURGERY

## 2024-09-15 PROCEDURE — 6360000002 HC RX W HCPCS: Performed by: NURSE PRACTITIONER

## 2024-09-15 PROCEDURE — 36415 COLL VENOUS BLD VENIPUNCTURE: CPT

## 2024-09-15 PROCEDURE — 3700000001 HC ADD 15 MINUTES (ANESTHESIA): Performed by: ORTHOPAEDIC SURGERY

## 2024-09-15 PROCEDURE — 93010 ELECTROCARDIOGRAM REPORT: CPT | Performed by: INTERNAL MEDICINE

## 2024-09-15 PROCEDURE — 6360000002 HC RX W HCPCS: Performed by: NURSE ANESTHETIST, CERTIFIED REGISTERED

## 2024-09-15 PROCEDURE — 82962 GLUCOSE BLOOD TEST: CPT

## 2024-09-15 PROCEDURE — 94150 VITAL CAPACITY TEST: CPT

## 2024-09-15 PROCEDURE — 80076 HEPATIC FUNCTION PANEL: CPT

## 2024-09-15 PROCEDURE — 7100000000 HC PACU RECOVERY - FIRST 15 MIN: Performed by: ORTHOPAEDIC SURGERY

## 2024-09-15 PROCEDURE — C8929 TTE W OR WO FOL WCON,DOPPLER: HCPCS

## 2024-09-15 PROCEDURE — 2500000003 HC RX 250 WO HCPCS: Performed by: NURSE ANESTHETIST, CERTIFIED REGISTERED

## 2024-09-15 PROCEDURE — 7100000001 HC PACU RECOVERY - ADDTL 15 MIN: Performed by: ORTHOPAEDIC SURGERY

## 2024-09-15 PROCEDURE — 84484 ASSAY OF TROPONIN QUANT: CPT

## 2024-09-15 PROCEDURE — 6360000002 HC RX W HCPCS: Performed by: ORTHOPAEDIC SURGERY

## 2024-09-15 PROCEDURE — 3600000012 HC SURGERY LEVEL 2 ADDTL 15MIN: Performed by: ORTHOPAEDIC SURGERY

## 2024-09-15 PROCEDURE — 6360000004 HC RX CONTRAST MEDICATION: Performed by: NURSE PRACTITIONER

## 2024-09-15 PROCEDURE — 3600000002 HC SURGERY LEVEL 2 BASE: Performed by: ORTHOPAEDIC SURGERY

## 2024-09-15 PROCEDURE — 84100 ASSAY OF PHOSPHORUS: CPT

## 2024-09-15 PROCEDURE — 2580000003 HC RX 258: Performed by: NURSE ANESTHETIST, CERTIFIED REGISTERED

## 2024-09-15 PROCEDURE — 2709999900 HC NON-CHARGEABLE SUPPLY: Performed by: ORTHOPAEDIC SURGERY

## 2024-09-15 PROCEDURE — 80048 BASIC METABOLIC PNL TOTAL CA: CPT

## 2024-09-15 PROCEDURE — 73552 X-RAY EXAM OF FEMUR 2/>: CPT

## 2024-09-15 PROCEDURE — 0QH634Z INSERTION OF INTERNAL FIXATION DEVICE INTO RIGHT UPPER FEMUR, PERCUTANEOUS APPROACH: ICD-10-PCS | Performed by: ORTHOPAEDIC SURGERY

## 2024-09-15 PROCEDURE — 2580000003 HC RX 258: Performed by: NURSE PRACTITIONER

## 2024-09-15 PROCEDURE — C1713 ANCHOR/SCREW BN/BN,TIS/BN: HCPCS | Performed by: ORTHOPAEDIC SURGERY

## 2024-09-15 PROCEDURE — 2140000000 HC CCU INTERMEDIATE R&B

## 2024-09-15 PROCEDURE — 2580000003 HC RX 258: Performed by: ORTHOPAEDIC SURGERY

## 2024-09-15 PROCEDURE — C1769 GUIDE WIRE: HCPCS | Performed by: ORTHOPAEDIC SURGERY

## 2024-09-15 PROCEDURE — 94760 N-INVAS EAR/PLS OXIMETRY 1: CPT

## 2024-09-15 PROCEDURE — 85610 PROTHROMBIN TIME: CPT

## 2024-09-15 DEVICE — SCREW BNE L90MM DIA6.5MM THRD L16MM CANC S STL SELF DRL ST: Type: IMPLANTABLE DEVICE | Site: HIP | Status: FUNCTIONAL

## 2024-09-15 DEVICE — SCREW BNE L85MM DIA6.5MM THRD L16MM CANC S STL SELF DRL ST: Type: IMPLANTABLE DEVICE | Site: HIP | Status: FUNCTIONAL

## 2024-09-15 RX ORDER — BUTALBITAL, ACETAMINOPHEN AND CAFFEINE 50; 325; 40 MG/1; MG/1; MG/1
1 TABLET ORAL DAILY
COMMUNITY

## 2024-09-15 RX ORDER — ALLOPURINOL 300 MG/1
300 TABLET ORAL DAILY
COMMUNITY

## 2024-09-15 RX ORDER — HYDROCODONE BITARTRATE AND ACETAMINOPHEN 5; 325 MG/1; MG/1
1 TABLET ORAL EVERY 4 HOURS PRN
Status: DISCONTINUED | OUTPATIENT
Start: 2024-09-15 | End: 2024-09-18 | Stop reason: HOSPADM

## 2024-09-15 RX ORDER — ENOXAPARIN SODIUM 100 MG/ML
40 INJECTION SUBCUTANEOUS DAILY
Status: DISCONTINUED | OUTPATIENT
Start: 2024-09-16 | End: 2024-09-16

## 2024-09-15 RX ORDER — LIDOCAINE HYDROCHLORIDE 10 MG/ML
INJECTION, SOLUTION EPIDURAL; INFILTRATION; INTRACAUDAL; PERINEURAL
Status: DISCONTINUED | OUTPATIENT
Start: 2024-09-15 | End: 2024-09-15 | Stop reason: SDUPTHER

## 2024-09-15 RX ORDER — COLCHICINE 0.6 MG/1
0.6 TABLET ORAL DAILY
COMMUNITY

## 2024-09-15 RX ORDER — MIDAZOLAM HYDROCHLORIDE 1 MG/ML
INJECTION INTRAMUSCULAR; INTRAVENOUS
Status: DISCONTINUED | OUTPATIENT
Start: 2024-09-15 | End: 2024-09-15 | Stop reason: SDUPTHER

## 2024-09-15 RX ORDER — LEVOTHYROXINE SODIUM 100 UG/1
100 TABLET ORAL DAILY
COMMUNITY

## 2024-09-15 RX ORDER — HYDROMORPHONE HYDROCHLORIDE 1 MG/ML
0.25 INJECTION, SOLUTION INTRAMUSCULAR; INTRAVENOUS; SUBCUTANEOUS EVERY 5 MIN PRN
Status: DISCONTINUED | OUTPATIENT
Start: 2024-09-15 | End: 2024-09-15 | Stop reason: HOSPADM

## 2024-09-15 RX ORDER — METOCLOPRAMIDE HYDROCHLORIDE 5 MG/ML
10 INJECTION INTRAMUSCULAR; INTRAVENOUS
Status: DISCONTINUED | OUTPATIENT
Start: 2024-09-15 | End: 2024-09-15 | Stop reason: HOSPADM

## 2024-09-15 RX ORDER — SODIUM CHLORIDE 9 MG/ML
INJECTION, SOLUTION INTRAVENOUS PRN
Status: DISCONTINUED | OUTPATIENT
Start: 2024-09-15 | End: 2024-09-18 | Stop reason: HOSPADM

## 2024-09-15 RX ORDER — PROPOFOL 10 MG/ML
INJECTION, EMULSION INTRAVENOUS
Status: DISCONTINUED | OUTPATIENT
Start: 2024-09-15 | End: 2024-09-15 | Stop reason: SDUPTHER

## 2024-09-15 RX ORDER — ONDANSETRON 2 MG/ML
INJECTION INTRAMUSCULAR; INTRAVENOUS
Status: DISCONTINUED | OUTPATIENT
Start: 2024-09-15 | End: 2024-09-15 | Stop reason: SDUPTHER

## 2024-09-15 RX ORDER — INSULIN GLARGINE 100 [IU]/ML
12 INJECTION, SOLUTION SUBCUTANEOUS DAILY
COMMUNITY

## 2024-09-15 RX ORDER — NALOXONE HYDROCHLORIDE 0.4 MG/ML
INJECTION, SOLUTION INTRAMUSCULAR; INTRAVENOUS; SUBCUTANEOUS PRN
Status: DISCONTINUED | OUTPATIENT
Start: 2024-09-15 | End: 2024-09-15 | Stop reason: HOSPADM

## 2024-09-15 RX ORDER — SODIUM CHLORIDE 9 MG/ML
INJECTION, SOLUTION INTRAVENOUS PRN
Status: DISCONTINUED | OUTPATIENT
Start: 2024-09-15 | End: 2024-09-15 | Stop reason: HOSPADM

## 2024-09-15 RX ORDER — PANTOPRAZOLE SODIUM 40 MG/1
40 TABLET, DELAYED RELEASE ORAL DAILY
COMMUNITY

## 2024-09-15 RX ORDER — SODIUM CHLORIDE 0.9 % (FLUSH) 0.9 %
5-40 SYRINGE (ML) INJECTION PRN
Status: DISCONTINUED | OUTPATIENT
Start: 2024-09-15 | End: 2024-09-18 | Stop reason: HOSPADM

## 2024-09-15 RX ORDER — HYDROMORPHONE HYDROCHLORIDE 1 MG/ML
0.5 INJECTION, SOLUTION INTRAMUSCULAR; INTRAVENOUS; SUBCUTANEOUS EVERY 5 MIN PRN
Status: DISCONTINUED | OUTPATIENT
Start: 2024-09-15 | End: 2024-09-15 | Stop reason: HOSPADM

## 2024-09-15 RX ORDER — BUMETANIDE 1 MG/1
1 TABLET ORAL DAILY
COMMUNITY

## 2024-09-15 RX ORDER — ROCURONIUM BROMIDE 10 MG/ML
INJECTION, SOLUTION INTRAVENOUS
Status: DISCONTINUED | OUTPATIENT
Start: 2024-09-15 | End: 2024-09-15 | Stop reason: SDUPTHER

## 2024-09-15 RX ORDER — SODIUM CHLORIDE 0.9 % (FLUSH) 0.9 %
5-40 SYRINGE (ML) INJECTION EVERY 12 HOURS SCHEDULED
Status: DISCONTINUED | OUTPATIENT
Start: 2024-09-15 | End: 2024-09-18 | Stop reason: HOSPADM

## 2024-09-15 RX ORDER — DIPHENHYDRAMINE HYDROCHLORIDE 50 MG/ML
12.5 INJECTION INTRAMUSCULAR; INTRAVENOUS
Status: DISCONTINUED | OUTPATIENT
Start: 2024-09-15 | End: 2024-09-15 | Stop reason: HOSPADM

## 2024-09-15 RX ORDER — TRANEXAMIC ACID 100 MG/ML
INJECTION, SOLUTION INTRAVENOUS
Status: DISCONTINUED | OUTPATIENT
Start: 2024-09-15 | End: 2024-09-15 | Stop reason: SDUPTHER

## 2024-09-15 RX ORDER — SPIRONOLACTONE 25 MG/1
25 TABLET ORAL DAILY
COMMUNITY

## 2024-09-15 RX ORDER — CEFAZOLIN SODIUM 1 G/3ML
INJECTION, POWDER, FOR SOLUTION INTRAMUSCULAR; INTRAVENOUS
Status: DISCONTINUED | OUTPATIENT
Start: 2024-09-15 | End: 2024-09-15 | Stop reason: SDUPTHER

## 2024-09-15 RX ORDER — POTASSIUM CHLORIDE 1500 MG/1
20 TABLET, EXTENDED RELEASE ORAL 4 TIMES DAILY
COMMUNITY

## 2024-09-15 RX ORDER — SODIUM CHLORIDE 0.9 % (FLUSH) 0.9 %
5-40 SYRINGE (ML) INJECTION EVERY 12 HOURS SCHEDULED
Status: DISCONTINUED | OUTPATIENT
Start: 2024-09-15 | End: 2024-09-15 | Stop reason: HOSPADM

## 2024-09-15 RX ORDER — ERGOCALCIFEROL 1.25 MG/1
50000 CAPSULE, LIQUID FILLED ORAL
COMMUNITY

## 2024-09-15 RX ORDER — SODIUM CHLORIDE 0.9 % (FLUSH) 0.9 %
5-40 SYRINGE (ML) INJECTION PRN
Status: DISCONTINUED | OUTPATIENT
Start: 2024-09-15 | End: 2024-09-15 | Stop reason: HOSPADM

## 2024-09-15 RX ORDER — DEXAMETHASONE SODIUM PHOSPHATE 10 MG/ML
INJECTION, SOLUTION INTRAMUSCULAR; INTRAVENOUS
Status: DISCONTINUED | OUTPATIENT
Start: 2024-09-15 | End: 2024-09-15 | Stop reason: SDUPTHER

## 2024-09-15 RX ORDER — INSULIN GLARGINE 100 [IU]/ML
12 INJECTION, SOLUTION SUBCUTANEOUS NIGHTLY
Status: DISCONTINUED | OUTPATIENT
Start: 2024-09-15 | End: 2024-09-18

## 2024-09-15 RX ORDER — SODIUM CHLORIDE, SODIUM LACTATE, POTASSIUM CHLORIDE, CALCIUM CHLORIDE 600; 310; 30; 20 MG/100ML; MG/100ML; MG/100ML; MG/100ML
INJECTION, SOLUTION INTRAVENOUS
Status: DISCONTINUED | OUTPATIENT
Start: 2024-09-15 | End: 2024-09-15 | Stop reason: SDUPTHER

## 2024-09-15 RX ADMIN — HYDROMORPHONE HYDROCHLORIDE 0.5 MG: 1 INJECTION, SOLUTION INTRAMUSCULAR; INTRAVENOUS; SUBCUTANEOUS at 11:49

## 2024-09-15 RX ADMIN — ONDANSETRON 4 MG: 2 INJECTION INTRAMUSCULAR; INTRAVENOUS at 10:33

## 2024-09-15 RX ADMIN — HYDROMORPHONE HYDROCHLORIDE 1 MG: 1 INJECTION, SOLUTION INTRAMUSCULAR; INTRAVENOUS; SUBCUTANEOUS at 08:45

## 2024-09-15 RX ADMIN — HYDROCODONE BITARTRATE AND ACETAMINOPHEN 1 TABLET: 5; 325 TABLET ORAL at 12:50

## 2024-09-15 RX ADMIN — PROPOFOL 120 MG: 10 INJECTION, EMULSION INTRAVENOUS at 10:29

## 2024-09-15 RX ADMIN — HYDROMORPHONE HYDROCHLORIDE 0.5 MG: 1 INJECTION, SOLUTION INTRAMUSCULAR; INTRAVENOUS; SUBCUTANEOUS at 10:17

## 2024-09-15 RX ADMIN — HYDROMORPHONE HYDROCHLORIDE 1 MG: 1 INJECTION, SOLUTION INTRAMUSCULAR; INTRAVENOUS; SUBCUTANEOUS at 01:24

## 2024-09-15 RX ADMIN — SODIUM CHLORIDE, SODIUM LACTATE, POTASSIUM CHLORIDE, AND CALCIUM CHLORIDE: 600; 310; 30; 20 INJECTION, SOLUTION INTRAVENOUS at 10:23

## 2024-09-15 RX ADMIN — HYDROCODONE BITARTRATE AND ACETAMINOPHEN 1 TABLET: 5; 325 TABLET ORAL at 20:46

## 2024-09-15 RX ADMIN — HYDROMORPHONE HYDROCHLORIDE 1 MG: 1 INJECTION, SOLUTION INTRAMUSCULAR; INTRAVENOUS; SUBCUTANEOUS at 14:15

## 2024-09-15 RX ADMIN — INSULIN LISPRO 2 UNITS: 100 INJECTION, SOLUTION INTRAVENOUS; SUBCUTANEOUS at 17:02

## 2024-09-15 RX ADMIN — SODIUM CHLORIDE, PRESERVATIVE FREE 1.5 ML: 5 INJECTION INTRAVENOUS at 09:43

## 2024-09-15 RX ADMIN — HYDROMORPHONE HYDROCHLORIDE 0.5 MG: 1 INJECTION, SOLUTION INTRAMUSCULAR; INTRAVENOUS; SUBCUTANEOUS at 11:39

## 2024-09-15 RX ADMIN — HYDROMORPHONE HYDROCHLORIDE 0.5 MG: 1 INJECTION, SOLUTION INTRAMUSCULAR; INTRAVENOUS; SUBCUTANEOUS at 10:29

## 2024-09-15 RX ADMIN — HYDROCODONE BITARTRATE AND ACETAMINOPHEN 1 TABLET: 5; 325 TABLET ORAL at 16:50

## 2024-09-15 RX ADMIN — SUGAMMADEX 200 MG: 100 INJECTION, SOLUTION INTRAVENOUS at 11:09

## 2024-09-15 RX ADMIN — ONDANSETRON 4 MG: 2 INJECTION INTRAMUSCULAR; INTRAVENOUS at 08:49

## 2024-09-15 RX ADMIN — HYDROMORPHONE HYDROCHLORIDE 1 MG: 1 INJECTION, SOLUTION INTRAMUSCULAR; INTRAVENOUS; SUBCUTANEOUS at 18:40

## 2024-09-15 RX ADMIN — INSULIN GLARGINE 12 UNITS: 100 INJECTION, SOLUTION SUBCUTANEOUS at 20:46

## 2024-09-15 RX ADMIN — HYDROMORPHONE HYDROCHLORIDE 1 MG: 1 INJECTION, SOLUTION INTRAMUSCULAR; INTRAVENOUS; SUBCUTANEOUS at 04:19

## 2024-09-15 RX ADMIN — MIDAZOLAM 2 MG: 1 INJECTION INTRAMUSCULAR; INTRAVENOUS at 10:23

## 2024-09-15 RX ADMIN — TRANEXAMIC ACID 1000 MG: 1 INJECTION, SOLUTION INTRAVENOUS at 10:33

## 2024-09-15 RX ADMIN — WATER 2000 MG: 1 INJECTION INTRAMUSCULAR; INTRAVENOUS; SUBCUTANEOUS at 18:39

## 2024-09-15 RX ADMIN — DEXAMETHASONE SODIUM PHOSPHATE 10 MG: 10 INJECTION, SOLUTION INTRAMUSCULAR; INTRAVENOUS at 10:33

## 2024-09-15 RX ADMIN — CEFAZOLIN 2 G: 1 INJECTION, POWDER, FOR SOLUTION INTRAMUSCULAR; INTRAVENOUS at 10:51

## 2024-09-15 RX ADMIN — TRANEXAMIC ACID 1000 MG: 1 INJECTION, SOLUTION INTRAVENOUS at 11:05

## 2024-09-15 RX ADMIN — LIDOCAINE HYDROCHLORIDE 50 MG: 10 INJECTION, SOLUTION EPIDURAL; INFILTRATION; INTRACAUDAL; PERINEURAL at 10:29

## 2024-09-15 RX ADMIN — ROCURONIUM BROMIDE 50 MG: 10 INJECTION, SOLUTION INTRAVENOUS at 10:29

## 2024-09-15 RX ADMIN — HYDROMORPHONE HYDROCHLORIDE 1 MG: 1 INJECTION, SOLUTION INTRAMUSCULAR; INTRAVENOUS; SUBCUTANEOUS at 23:12

## 2024-09-15 ASSESSMENT — PAIN DESCRIPTION - LOCATION
LOCATION: HIP
LOCATION: HIP;RIB CAGE
LOCATION: HIP
LOCATION: HIP;RIB CAGE
LOCATION: HIP;RIB CAGE

## 2024-09-15 ASSESSMENT — PAIN SCALES - GENERAL
PAINLEVEL_OUTOF10: 9
PAINLEVEL_OUTOF10: 8
PAINLEVEL_OUTOF10: 7
PAINLEVEL_OUTOF10: 8
PAINLEVEL_OUTOF10: 9
PAINLEVEL_OUTOF10: 9
PAINLEVEL_OUTOF10: 7
PAINLEVEL_OUTOF10: 8
PAINLEVEL_OUTOF10: 6
PAINLEVEL_OUTOF10: 8
PAINLEVEL_OUTOF10: 7
PAINLEVEL_OUTOF10: 8

## 2024-09-15 ASSESSMENT — PAIN DESCRIPTION - ORIENTATION
ORIENTATION: RIGHT
ORIENTATION: RIGHT;LEFT
ORIENTATION: RIGHT
ORIENTATION: RIGHT

## 2024-09-15 ASSESSMENT — PAIN DESCRIPTION - DESCRIPTORS
DESCRIPTORS: SHARP;CRUSHING
DESCRIPTORS: HEAVINESS;NAGGING
DESCRIPTORS: ACHING;SPASM;THROBBING
DESCRIPTORS: ACHING;THROBBING;SPASM
DESCRIPTORS: NAGGING

## 2024-09-15 ASSESSMENT — PAIN - FUNCTIONAL ASSESSMENT
PAIN_FUNCTIONAL_ASSESSMENT: PREVENTS OR INTERFERES WITH MANY ACTIVE NOT PASSIVE ACTIVITIES
PAIN_FUNCTIONAL_ASSESSMENT: PREVENTS OR INTERFERES SOME ACTIVE ACTIVITIES AND ADLS
PAIN_FUNCTIONAL_ASSESSMENT: PREVENTS OR INTERFERES WITH ALL ACTIVE AND SOME PASSIVE ACTIVITIES

## 2024-09-15 NOTE — OP NOTE
Patient Name: Jonh  MRN: [id number]  : 1950    DATE of SURGERY: 9/15/2024    SURGEON: Casey Bowen MD    ASSISTANT: None    PREOPERATIVE DIAGNOSIS:   1.  Acute traumatic nondisplaced subcapital fracture of the neck of the Right femur, initial encounter for closed fracture  2.  Syncopal episode  3.  Insulin-dependent diabetes mellitus  4.  Hypertension  5.  Hypothyroidism  6.  Chronic tobacco use    POSTOPERATIVE DIAGNOSIS:   1.  Acute traumatic nondisplaced subcapital fracture of the neck of the Right femur, initial encounter for closed fracture  2.  Syncopal episode  3.  Insulin-dependent diabetes mellitus  4.  Hypertension  5.  Hypothyroidism  6.  Chronic tobacco use    PROCEDURE PERFORMED:   1.  Percutaneous screw fixation of Right minimally displaced subcapital femoral neck fracture      IMPLANTS: Synthes 6.5 cannulated screws    ANESTHESIA USED: General endotrachial anesthesia    OPERATIVE INDICATIONS: 74-year-old female status post fall after a syncopal event with the above named diagnosis.  Surgical indications include fracture displacement, stabilization of fracture, and mobilization of the patient.  Risks include, but are not limited to, anesthesia, bleeding, infection, pain, damage to local structures, need for further surgery, malunion, nonunion, fracture displacement, failure of hardware, intraoperative death.  Risks, benefits, and alternative were discussed and the patient wishes to proceed with surgery.    ESTIMATED BLOOD LOSS: 50 mL    DRAINS: None     COMPLICATIONS: No intra-operative complications    SPECIMENS: None    PROCEDURE in DETAIL:  The patient was seen in the preoperative holding room, the informed consent was reviewed and signed, and the correct operative extremity marked with the patient’s agreement.  The patient was transported to the operating room, where a timeout was performed identifying the correct patient and operative site.  Perioperative antibiotics were administered  prior to incision. C-arm images were utilized noting the fracture to remain minimally displaced and therefore perc screws were chosen.    After a sterile prep and drape, threaded 3.2 mm k-wires were placed in strategic fashion from the lateral aspect of the proximal femur into the femoral head in an inverted triangle fashion with the inferior screw being placed centrally and the superior screws being placed anteriorly and posteriorly.  K-wires were measured and the path of the screws were drilled.  The appropriately sized partially threaded 6.5 cannulated screws were inserted gaining excellent purchase.    C-arm images verified all implants were well contained within the femoral head as an approach-withdraw technique was utilized.      The incisions were irrigated, closed in layers and the skin was stapled.  A sterile dressing was placed, the patient awakened from anesthesia, transported to the PACU in stable condition.      POSTOPERATIVE PLAN:  1) PWB, 50%  2) DVT prophylaxis x 4 weeks  3) PT/OT eval and treat  4) Pain control

## 2024-09-15 NOTE — PROGRESS NOTES
4 Eyes Skin Assessment     NAME:  Jacqueline Richardson  YOB: 1950  MEDICAL RECORD NUMBER:  [id number]    The patient is being assessed for  Admission    I agree that at least one RN has performed a thorough Head to Toe Skin Assessment on the patient. ALL assessment sites listed below have been assessed.      Areas assessed by both nurses:    Head, Face, Ears, Shoulders, Back, Chest, Arms, Elbows, Hands, and Legs. Feet and Heels        Does the Patient have a Wound? No noted wound(s)       Randy Prevention initiated by RN: No  Wound Care Orders initiated by RN: No    Pressure Injury (Stage 3,4, Unstageable, DTI, NWPT, and Complex wounds) if present, place Wound referral order by RN under : No    New Ostomies, if present place, Ostomy referral order under : No     Nurse 1 eSignature: Electronically signed by Karley Cummins RN on 9/15/24 at 2:50 AM CDT    **SHARE this note so that the co-signing nurse can place an eSignature**    Nurse 2 eSignature: Electronically signed by Jenna Ruffin RN on 9/15/24 at 5:01 AM CDT

## 2024-09-15 NOTE — CONSULTS
posterior osteophytes.  Bilateral facet hypertrophy with otherwise normal alignment of the facet joints.  Dystrophic calcifications within the soft tissues adjacent to the odontoid process.   Degenerative spurring of the atlanto-dental interval.  The odontoid process is intact.  Intact anterior and posterior arch of C1.  Normal craniocervical alignment.  Mild reversal of cervical lordosis.  No evidence of acute osteomyelitis.  Visualized portions of the upper lungs are grossly unremarkable.  Vascular calcifications.  No acute fracture identified.   Segmental analysis is as follows:  C2/C3:  No significant central canal narrowing. No significant foraminal narrowing.  C3/C4:  Posterior disc/osteophyte complex with mild central canal narrowing.  Facet and uncovertebral hypertrophy. Mild foraminal narrowing.  C4/C5:  Posterior disc/osteophyte complex with mild central canal narrowing.  Facet and uncovertebral hypertrophy. Mild to moderate left and mild right foraminal narrowing.  C5/C6:  Posterior disc/osteophyte complex effaces the ventral portion of the thecal sac and is eccentric to the left side producing the central canal to 6.5 mm anterior-posterior mentioned just left of the midline.  Marked facet and uncovertebral hypertrophy. Marked left and moderate to severe right foraminal narrowing.  C6/C7:  Posterior disc/osteophyte complex with minimal central canal narrowing.  Bilateral facet hypertrophy. Moderate to severe right and moderate left foraminal narrowing.  C7/T1:  No significant central canal narrowing. No significant foraminal narrowing.         No acute osseous abnormality involving the cervical spine.  Straightening of the cervical spine with grade 1 anterolisthesis at C3/C4 and C4/C5 with grade 1 retrolisthesis at C5/C6.  Mild reversal of cervical lordosis.  Dextrocurvature of the spine.  Demineralization.  Marked multilevel degenerative disc/joint disease.  Moderate to severe central canal narrowing at  C5/C6 with less pronounce central canal narrowing.  Additional levels.  Multilevel foraminal narrowing as detailed above.  All CT scans are performed using dose optimization techniques as appropriate to the performed exam and include at least one of the following: Automated exposure control, adjustment of the mA and/or kV according to size, and the use of iterative reconstruction technique.  ______________________________________ Electronically signed by: JOB DIAZ M.D. Date:     09/14/2024 Time:    15:34     CT LUMBAR SPINE WO CONTRAST    Result Date: 9/14/2024  EXAM: CT OF THE LUMBAR SPINE WITHOUT CONTRAST  TECHNIQUE:  Noncontrast CT of the lumbar spine performed with multiplanar reformats.  HISTORY:  Trauma  COMPARISON:  None.  FINDINGS:  No acute fracture. Chronic-appearing left 12th rib fracture.  Transitional anatomy with four lumbar vertebrae, and partially lumbarized S1 segment.  Severe disc height loss, endplate degeneration, and vacuum disc at L4-S1.  Grade 1 anterolisthesis at L3-4 and L4-S1.  Right unilateral L4 pars interarticularis defect.  Severe facet arthropathy at L3-4 and L4-S1. Grade 1 lateral listhesis of of L3-4.  Mild dextroconvex scoliosis.  No evidence of severe spinal canal stenosis.  High-grade neural foraminal stenosis on the right at L3-4 and left L4-S1.  Multifocal atherosclerotic calcifications.  Infrarenal abdominal aortic aneurysm measures up to 4.2 cm in diameter.  Post cholecystectomy.  Pneumobilia.  Nonobstructive left renal stone measures 3 mm.        No acute fracture of the lumbar spine.  ASVD.  Abdominal aortic aneurysm measures 4.2 cm in diameter.  Transitional anatomy with four lumbar vertebrae.  Grade 1 anterolisthesis at L3-4 and L4-S1.  Right unilateral L4 pars defect.  Advanced degenerative changes.  All CT scans are performed using dose optimization techniques as appropriate to the performed exam and include at least one of the following: Automated exposure

## 2024-09-15 NOTE — PROGRESS NOTES
Jacqueline Richardson arrived to room # 714 .   Presented with: syncope and collapse   Mental Status: Patient is oriented, alert, coherent, logical, thought processes intact, and able to concentrate and follow conversation.   Vitals:    09/14/24 1915   BP: (!) 103/59   Pulse: (!) 114   Resp: 25   Temp:    SpO2: 93%     Patient safety contract and falls prevention contract reviewed with patient Yes.  Oriented Patient to room.  Call light within reach. Yes.  Needs, issues or concerns expressed at this time: no.      Electronically signed by Karley Cummins RN on 9/14/2024 at 7:48 PM

## 2024-09-15 NOTE — CONSULTS
[facility]  Cardiology Consult      Requesting MD:  Troy Sarmiento MD   Admit Status:         History obtained from:   [] Patient  [] Other (specify):     Patient:  Jacqueline Richardson  [id number]     Chief Complaint:   Chief Complaint   Patient presents with    Fall     Pt arrives from home via EMS.  called due to finding patient passed out on floor. Pt received covid vaccine Thursday, and has felt terrible since. Pt does not remember falling.     Hip Pain     Pt states she is unable to straighten right hip due to pain, also hurting in the left rib area.          HPI:     74 y.o. female who presented to [facility] ED for evaluation of right hip injury following syncope at home. Pt has history of diabetes, hypertension, hypothyroidism and pancreatitis.     Pt is here with her  who relates that patient got up walked toward there carport door and collapsed. Pt doesn't recall events leading up to syncope. She denies problems with shortness of breath, syncope as well as history of pe/dvt. She received covid vaccination two days ago and relates she has had general malaise since.      In ED, consult placed to Dr. Bowen with plan for CT hip. CTA pulmonary-No evidence of pulmonary artery embolism. EKG ST hr 115b/min, troponin 20, ddimer >20, sodium 139, potassium 4.3, creatinine 0.8/bun 12, glucose 115, wbc 14.7, hgb 14.1, platelets 406k. Imaging of right hip-Cortical irregularity involving the sub capital region of the right femoral neck, concerning for an acute nondisplaced fracture. Pelvis-Possible nondisplaced subcapital fracture of the right hip.    Pt has DM for 9 years. Smoker 30 years. No exertional chest pain or sob  Activity level more than 4 mets.     Review of Systems   Constitutional: Negative.    HENT: Negative.     Eyes: Negative.    Respiratory: Negative.     Cardiovascular: Negative.    Gastrointestinal:  Positive for abdominal distention.   Endocrine: Negative.   9/14/2024  EXAM:  X-RAY RIGHT FEMUR TWO-VIEW.  HISTORY:  Pain status post fall.  COMPARISON:  None.      FINDINGS/Impression:   Cortical irregularity involving the sub capital region of the right femoral neck, concerning for an acute nondisplaced fracture.  Alignment is maintained.  The joint spaces are narrowed.  Decreased mineralization.   ______________________________________ Electronically signed by: SCOTT LOWE M.D. Date:     09/14/2024 Time:    15:04       Assessment:  Syncope  DM  HTN  Rt hip fracture  smoker      Recommendations:    Low risk for hip surgery  Activity level more t alcaraz 4 mets  Echo for EF  OP zio  Op cardiology fu    D/w pt

## 2024-09-15 NOTE — PROGRESS NOTES
Hospitalist Progress Note        PCP: Jodi Fine, APRN - CNP    Date of Admission: 9/14/2024    Length of Stay: 1    Chief Complaint: 74 y.o. female who presented [facility] ED for evaluation of right hip injury following syncope at home. Pt has history of diabetes, hypertension, hypothyroidism and pancreatitis s/p pancreatic resection and on insulin at home.       Ddimer >20, but no PE on CTPA          Subjective: plan for OR this morning.       Trop slow uptrend 20 20 26 32 42  Pt had ECHO done stat this am - this does not reveal any acute pathology.     Cleared for OR by cardiology.             Medications:  Reviewed    Infusion Medications    sodium chloride      sodium chloride 75 mL/hr at 09/14/24 2204    dextrose       Scheduled Medications    insulin glargine  12 Units SubCUTAneous Nightly    sodium chloride flush  5-40 mL IntraVENous 2 times per day    ceFAZolin (ANCEF) IVPB  2,000 mg IntraVENous Q8H    [START ON 9/16/2024] enoxaparin  40 mg SubCUTAneous Daily    insulin lispro  0-4 Units SubCUTAneous TID WC    insulin lispro  0-4 Units SubCUTAneous Nightly     PRN Meds: HYDROcodone 5 mg - acetaminophen, sodium chloride flush, sodium chloride, potassium chloride **OR** potassium alternative oral replacement **OR** potassium chloride, magnesium sulfate, ondansetron **OR** ondansetron, polyethylene glycol, acetaminophen **OR** acetaminophen, glucose, dextrose bolus **OR** dextrose bolus, glucagon (rDNA), dextrose, HYDROmorphone **OR** HYDROmorphone, naloxone      Intake/Output Summary (Last 24 hours) at 9/15/2024 1416  Last data filed at 9/15/2024 1110  Gross per 24 hour   Intake --   Output 1100 ml   Net -1100 ml       Physical Exam Performed:    BP (!) 145/86   Pulse 96   Temp 98 °F (36.7 °C) (Temporal)   Resp 16   Ht 1.651 m (5' 5\")   Wt 56.2 kg (124 lb)   SpO2 90%   BMI 20.63 kg/m²     General appearance: No apparent distress, appears stated age and cooperative.  HEENT: Pupils equal, round, and  09/14/2024   Time:    15:04       XR PELVIS (1-2 VIEWS)   Final Result   1.  Possible nondisplaced subcapital fracture of the right hip.  Correlation with CT scan or MRI advised.   2.  Degenerative changes.   3.  Otherwise unremarkable image of the pelvis.                   ______________________________________    Electronically signed by: MYRIAM KEE M.D.   Date:     09/14/2024   Time:    14:58       Vascular duplex lower extremity venous bilateral    (Results Pending)   Vascular duplex carotid bilateral    (Results Pending)       IP CONSULT TO ORTHOPEDIC SURGERY  IP CONSULT TO CARDIOLOGY    Assessment/Plan:    Active Hospital Problems    Diagnosis     Smoker [F17.200]     Syncope [R55]     Closed right hip fracture (HCC) [S72.001A]     Diabetes (HCC) [E11.9]     Hypertension [I10]     Hypothyroidism [E03.9]     Syncope and collapse [R55]     Nondisplaced fracture of neck of right femur (HCC) [S72.001A]          R femoral Fx.   To OR with Dr Bowen today.       Elevated troponin.   Syncope at home.   Head CT negative.   ECHO reviewed - no acute findings.   Etiology of syncope unclear at this time.   Will need Zio Cardiac event monitor on discharge.       Hx of chronic pancreatitis s/p pancreatic resection.   On insulin   Add lantus.   Cont ISS.               DVT Prophylaxis: lovenox  Diet: ADULT DIET; Clear Liquid  Code Status: Full Code      Dispo - cc        Troy Sarmiento MD

## 2024-09-16 ENCOUNTER — APPOINTMENT (OUTPATIENT)
Dept: GENERAL RADIOLOGY | Age: 74
DRG: 481 | End: 2024-09-16
Attending: INTERNAL MEDICINE
Payer: MEDICARE

## 2024-09-16 ENCOUNTER — APPOINTMENT (OUTPATIENT)
Dept: VASCULAR LAB | Age: 74
DRG: 481 | End: 2024-09-16
Attending: ORTHOPAEDIC SURGERY
Payer: MEDICARE

## 2024-09-16 LAB
ALBUMIN SERPL-MCNC: 3.2 G/DL (ref 3.5–5.2)
ANION GAP SERPL CALCULATED.3IONS-SCNC: 13 MMOL/L (ref 7–19)
BASOPHILS # BLD: 0.1 K/UL (ref 0–0.2)
BASOPHILS NFR BLD: 0.5 % (ref 0–1)
BUN SERPL-MCNC: 11 MG/DL (ref 8–23)
CALCIUM SERPL-MCNC: 8.1 MG/DL (ref 8.8–10.2)
CHLORIDE SERPL-SCNC: 96 MMOL/L (ref 98–111)
CO2 SERPL-SCNC: 22 MMOL/L (ref 22–29)
CREAT SERPL-MCNC: 0.6 MG/DL (ref 0.5–0.9)
ECHO BSA: 1.62 M2
EOSINOPHIL # BLD: 0.1 K/UL (ref 0–0.6)
EOSINOPHIL NFR BLD: 0.5 % (ref 0–5)
ERYTHROCYTE [DISTWIDTH] IN BLOOD BY AUTOMATED COUNT: 13.5 % (ref 11.5–14.5)
GLUCOSE BLD-MCNC: 119 MG/DL (ref 70–99)
GLUCOSE BLD-MCNC: 121 MG/DL (ref 70–99)
GLUCOSE BLD-MCNC: 168 MG/DL (ref 70–99)
GLUCOSE SERPL-MCNC: 159 MG/DL (ref 70–99)
HCT VFR BLD AUTO: 36.8 % (ref 37–47)
HGB BLD-MCNC: 11.6 G/DL (ref 12–16)
IMM GRANULOCYTES # BLD: 0.1 K/UL
LYMPHOCYTES # BLD: 1.9 K/UL (ref 1.1–4.5)
LYMPHOCYTES NFR BLD: 14.7 % (ref 20–40)
MCH RBC QN AUTO: 32.5 PG (ref 27–31)
MCHC RBC AUTO-ENTMCNC: 31.5 G/DL (ref 33–37)
MCV RBC AUTO: 103.1 FL (ref 81–99)
MONOCYTES # BLD: 1.3 K/UL (ref 0–0.9)
MONOCYTES NFR BLD: 10.4 % (ref 0–10)
NEUTROPHILS # BLD: 9.4 K/UL (ref 1.5–7.5)
NEUTS SEG NFR BLD: 73.5 % (ref 50–65)
PERFORMED ON: ABNORMAL
PHOSPHATE SERPL-MCNC: 1.8 MG/DL (ref 2.5–4.5)
PLATELET # BLD AUTO: 274 K/UL (ref 130–400)
PMV BLD AUTO: 9.4 FL (ref 9.4–12.3)
POTASSIUM SERPL-SCNC: 3.6 MMOL/L (ref 3.5–5)
RBC # BLD AUTO: 3.57 M/UL (ref 4.2–5.4)
SODIUM SERPL-SCNC: 131 MMOL/L (ref 136–145)
WBC # BLD AUTO: 12.8 K/UL (ref 4.8–10.8)

## 2024-09-16 PROCEDURE — 33285 INSJ SUBQ CAR RHYTHM MNTR: CPT | Performed by: INTERNAL MEDICINE

## 2024-09-16 PROCEDURE — 2500000003 HC RX 250 WO HCPCS: Performed by: INTERNAL MEDICINE

## 2024-09-16 PROCEDURE — 2580000003 HC RX 258: Performed by: ORTHOPAEDIC SURGERY

## 2024-09-16 PROCEDURE — 97530 THERAPEUTIC ACTIVITIES: CPT

## 2024-09-16 PROCEDURE — 80069 RENAL FUNCTION PANEL: CPT

## 2024-09-16 PROCEDURE — 93880 EXTRACRANIAL BILAT STUDY: CPT

## 2024-09-16 PROCEDURE — 97165 OT EVAL LOW COMPLEX 30 MIN: CPT

## 2024-09-16 PROCEDURE — 93970 EXTREMITY STUDY: CPT

## 2024-09-16 PROCEDURE — 2140000000 HC CCU INTERMEDIATE R&B

## 2024-09-16 PROCEDURE — 97161 PT EVAL LOW COMPLEX 20 MIN: CPT

## 2024-09-16 PROCEDURE — 99232 SBSQ HOSP IP/OBS MODERATE 35: CPT | Performed by: INTERNAL MEDICINE

## 2024-09-16 PROCEDURE — 85025 COMPLETE CBC W/AUTO DIFF WBC: CPT

## 2024-09-16 PROCEDURE — 36415 COLL VENOUS BLD VENIPUNCTURE: CPT

## 2024-09-16 PROCEDURE — 71100 X-RAY EXAM RIBS UNI 2 VIEWS: CPT

## 2024-09-16 PROCEDURE — C1764 EVENT RECORDER, CARDIAC: HCPCS | Performed by: INTERNAL MEDICINE

## 2024-09-16 PROCEDURE — 6360000002 HC RX W HCPCS: Performed by: ORTHOPAEDIC SURGERY

## 2024-09-16 PROCEDURE — 94760 N-INVAS EAR/PLS OXIMETRY 1: CPT

## 2024-09-16 PROCEDURE — 6370000000 HC RX 637 (ALT 250 FOR IP): Performed by: ORTHOPAEDIC SURGERY

## 2024-09-16 PROCEDURE — 6360000002 HC RX W HCPCS: Performed by: INTERNAL MEDICINE

## 2024-09-16 PROCEDURE — 82962 GLUCOSE BLOOD TEST: CPT

## 2024-09-16 PROCEDURE — 0JH632Z INSERTION OF MONITORING DEVICE INTO CHEST SUBCUTANEOUS TISSUE AND FASCIA, PERCUTANEOUS APPROACH: ICD-10-PCS | Performed by: INTERNAL MEDICINE

## 2024-09-16 DEVICE — MONITOR CRD 1.4 CC 3.4 GM INSERTABLE LINQ: Type: IMPLANTABLE DEVICE | Status: FUNCTIONAL

## 2024-09-16 RX ORDER — LIDOCAINE HYDROCHLORIDE AND EPINEPHRINE BITARTRATE 20; .01 MG/ML; MG/ML
INJECTION, SOLUTION SUBCUTANEOUS PRN
Status: DISCONTINUED | OUTPATIENT
Start: 2024-09-16 | End: 2024-09-16 | Stop reason: HOSPADM

## 2024-09-16 RX ORDER — ENOXAPARIN SODIUM 100 MG/ML
1 INJECTION SUBCUTANEOUS 2 TIMES DAILY
Status: DISCONTINUED | OUTPATIENT
Start: 2024-09-16 | End: 2024-09-18

## 2024-09-16 RX ORDER — LIDOCAINE 4 G/G
1 PATCH TOPICAL DAILY
Status: DISCONTINUED | OUTPATIENT
Start: 2024-09-16 | End: 2024-09-18 | Stop reason: HOSPADM

## 2024-09-16 RX ADMIN — HYDROCODONE BITARTRATE AND ACETAMINOPHEN 1 TABLET: 5; 325 TABLET ORAL at 07:59

## 2024-09-16 RX ADMIN — WATER 2000 MG: 1 INJECTION INTRAMUSCULAR; INTRAVENOUS; SUBCUTANEOUS at 03:54

## 2024-09-16 RX ADMIN — SODIUM CHLORIDE: 9 INJECTION, SOLUTION INTRAVENOUS at 16:43

## 2024-09-16 RX ADMIN — SODIUM CHLORIDE, PRESERVATIVE FREE 10 ML: 5 INJECTION INTRAVENOUS at 08:59

## 2024-09-16 RX ADMIN — ENOXAPARIN SODIUM 60 MG: 100 INJECTION SUBCUTANEOUS at 20:17

## 2024-09-16 RX ADMIN — HYDROCODONE BITARTRATE AND ACETAMINOPHEN 1 TABLET: 5; 325 TABLET ORAL at 16:28

## 2024-09-16 RX ADMIN — INSULIN GLARGINE 12 UNITS: 100 INJECTION, SOLUTION SUBCUTANEOUS at 20:16

## 2024-09-16 RX ADMIN — HYDROMORPHONE HYDROCHLORIDE 1 MG: 1 INJECTION, SOLUTION INTRAMUSCULAR; INTRAVENOUS; SUBCUTANEOUS at 14:02

## 2024-09-16 RX ADMIN — HYDROCODONE BITARTRATE AND ACETAMINOPHEN 1 TABLET: 5; 325 TABLET ORAL at 12:19

## 2024-09-16 RX ADMIN — ENOXAPARIN SODIUM 40 MG: 100 INJECTION SUBCUTANEOUS at 08:47

## 2024-09-16 RX ADMIN — HYDROMORPHONE HYDROCHLORIDE 1 MG: 1 INJECTION, SOLUTION INTRAMUSCULAR; INTRAVENOUS; SUBCUTANEOUS at 17:52

## 2024-09-16 RX ADMIN — HYDROCODONE BITARTRATE AND ACETAMINOPHEN 1 TABLET: 5; 325 TABLET ORAL at 20:18

## 2024-09-16 RX ADMIN — HYDROCODONE BITARTRATE AND ACETAMINOPHEN 1 TABLET: 5; 325 TABLET ORAL at 03:54

## 2024-09-16 ASSESSMENT — PAIN SCALES - GENERAL
PAINLEVEL_OUTOF10: 6
PAINLEVEL_OUTOF10: 3
PAINLEVEL_OUTOF10: 8
PAINLEVEL_OUTOF10: 9
PAINLEVEL_OUTOF10: 6
PAINLEVEL_OUTOF10: 7
PAINLEVEL_OUTOF10: 8
PAINLEVEL_OUTOF10: 7
PAINLEVEL_OUTOF10: 7
PAINLEVEL_OUTOF10: 8

## 2024-09-16 ASSESSMENT — PAIN DESCRIPTION - ORIENTATION
ORIENTATION: RIGHT
ORIENTATION: RIGHT;LEFT
ORIENTATION: LEFT;RIGHT
ORIENTATION: RIGHT
ORIENTATION: RIGHT;LEFT

## 2024-09-16 ASSESSMENT — PAIN DESCRIPTION - DESCRIPTORS
DESCRIPTORS: ACHING
DESCRIPTORS: DISCOMFORT;DULL;ACHING
DESCRIPTORS: ACHING
DESCRIPTORS: ACHING;SHARP
DESCRIPTORS: ACHING;JABBING

## 2024-09-16 ASSESSMENT — PAIN DESCRIPTION - LOCATION
LOCATION: HIP
LOCATION: HIP;RIB CAGE
LOCATION: ANKLE
LOCATION: HIP;RIB CAGE
LOCATION: HIP
LOCATION: HIP
LOCATION: HIP;RIB CAGE

## 2024-09-16 ASSESSMENT — PAIN - FUNCTIONAL ASSESSMENT
PAIN_FUNCTIONAL_ASSESSMENT: PREVENTS OR INTERFERES WITH MANY ACTIVE NOT PASSIVE ACTIVITIES
PAIN_FUNCTIONAL_ASSESSMENT: PREVENTS OR INTERFERES SOME ACTIVE ACTIVITIES AND ADLS

## 2024-09-16 NOTE — PROGRESS NOTES
Vascular lab preliminary.    Bilateral carotid duplex scan completed.  B/L ICA's <50% stenosis.  B/l Vertebrals antegrade flow.      Bilateral L/E venous duplex scan completed.  + DVT in one of the right peroneal veins only at this time.  + Baker's cyst behind right knee measures 1.13 cm x 3.27 cm.    Final report pending.

## 2024-09-16 NOTE — DISCHARGE INSTRUCTIONS
1.  Partial weightbearing (50%) to the right lower extremity.  2.  Dressing: Plan to keep dressing in place for 2 weeks unless it becomes saturated.  Then please apply a new Mepilex dressing.  Minimize dressing changes as able.  3.  Pain medication as prescribed.  4.  DVT prophylaxis: Aspirin 325 mg daily for 1 month after surgery.  5.  Physical and occupational therapy as prescribed.  6.  Call our clinic number or return to the nearest emergency room should she develop chest pain, shortness of breath, fever, chills or worsening pain not controlled with pain medication. [phone]

## 2024-09-16 NOTE — PROGRESS NOTES
Hospitalist Progress Note        PCP: Jodi Fine, APRN - CNP    Date of Admission: 9/14/2024    Length of Stay: 2    Chief Complaint: 74 y.o. female who presented to [facility] ED for evaluation of right hip injury following syncope at home. Pt has history of diabetes, hypertension, hypothyroidism and pancreatitis s/p pancreatic resection and on insulin at home.       Ddimer >20, but no PE on CTPA          Subjective:     S/p perc screw fixation R minimally displaced subcapital femoral neck fracture with Dr Bowen 9/15.      Reports lots of pain today - but more so over left lateral lower rib cage then the hip.              Medications:  Reviewed    Infusion Medications    sodium chloride      sodium chloride 75 mL/hr at 09/14/24 2204    dextrose       Scheduled Medications    lidocaine  1 patch Topical Daily    insulin glargine  12 Units SubCUTAneous Nightly    sodium chloride flush  5-40 mL IntraVENous 2 times per day    enoxaparin  40 mg SubCUTAneous Daily    insulin lispro  0-4 Units SubCUTAneous TID WC    insulin lispro  0-4 Units SubCUTAneous Nightly     PRN Meds: HYDROcodone 5 mg - acetaminophen, sodium chloride flush, sodium chloride, potassium chloride **OR** potassium alternative oral replacement **OR** potassium chloride, magnesium sulfate, ondansetron **OR** ondansetron, polyethylene glycol, acetaminophen **OR** acetaminophen, glucose, dextrose bolus **OR** dextrose bolus, glucagon (rDNA), dextrose, HYDROmorphone **OR** HYDROmorphone, naloxone      Intake/Output Summary (Last 24 hours) at 9/16/2024 1408  Last data filed at 9/15/2024 1753  Gross per 24 hour   Intake 1100 ml   Output 350 ml   Net 750 ml       Physical Exam Performed:    /79   Pulse 89   Temp 97.7 °F (36.5 °C) (Temporal)   Resp 16   Ht 1.651 m (5' 5\")   Wt 57.6 kg (127 lb)   SpO2 97%   BMI 21.13 kg/m²     General appearance: No apparent distress, appears stated age and cooperative.  HEENT: Pupils equal, round, and reactive to    ______________________________________    Electronically signed by: SCOTT LOWE M.D.   Date:     09/14/2024   Time:    15:04       XR PELVIS (1-2 VIEWS)   Final Result   1.  Possible nondisplaced subcapital fracture of the right hip.  Correlation with CT scan or MRI advised.   2.  Degenerative changes.   3.  Otherwise unremarkable image of the pelvis.                   ______________________________________    Electronically signed by: MYRIAM KEE M.D.   Date:     09/14/2024   Time:    14:58       Vascular duplex lower extremity venous bilateral    (Results Pending)   Vascular duplex carotid bilateral    (Results Pending)   XR RIBS LEFT (2 VIEWS)    (Results Pending)       IP CONSULT TO ORTHOPEDIC SURGERY  IP CONSULT TO CARDIOLOGY    Assessment/Plan:    Active Hospital Problems    Diagnosis     Smoker [F17.200]     Syncope [R55]     Closed right hip fracture (HCC) [S72.001A]     Diabetes (HCC) [E11.9]     Hypertension [I10]     Hypothyroidism [E03.9]     Syncope and collapse [R55]     Nondisplaced fracture of neck of right femur (HCC) [S72.001A]          R femoral Fx.   To OR with Dr Bowen 9/15 for percutaneous screw fixation      Elevated troponin.   Syncope at home.   Head CT negative.   ECHO reviewed - no acute findings.   Etiology of syncope unclear at this time.   Will need Zio Cardiac event monitor on discharge.       Hx of chronic pancreatitis s/p pancreatic resection On insulin at baseline   Added lantus.   Cont ISS.   Advance to CCD      Rib pain left side posterior and lateral with bruise present in the area.   CTA did not suggest pulm contusion or bone fracture.   Will get dedicated rib XR   Cont pain control and IS.       DVT Prophylaxis: lovenox  Diet: ADULT DIET; Regular; 4 carb choices (60 gm/meal)  Code Status: Full Code      Dispo - cc        Troy Sarmiento MD

## 2024-09-16 NOTE — PROCEDURES
PROCEDURE NOTE  Date: 9/16/2024   Name: Jacqueline Richardson  YOB: 1950    Procedures        Indications for Reveal LINQ Implantation -traumatic syncope      Conscious Sedation Protocol Used During this Procedure -          Anesthesia: Moderate   Sedation: 0 mg Midazolam (Versed)  0 mcg Fentanyl   Start time: 5:05 PM   Stop time: 5:10 PM   ASA Class: 3   EBL Less than 2 mL      A trained medical personnel administered medications at my direction.  After obtaining informed written consent, the patient was brought to the catheterization laboratory where the left chest area was prepared in the usual sterile fashion.  The patient was monitored continuously with ECG, pulse oximetry, blood pressure monitoring and direct observation.    After obtaining informed consent, the patient was brought to the catheterization laboratory where the left chest was prepared in the usual sterile fashion.  The 's hands were scrubbed in a betadine solution for 5 minutes.  Utilizing local anesthesia and a percutaneous technique, a single puncture was made in the left chest.    The Implantable Cardiac Monitor was then inserted and a sterile dressing applied.  The patient was taken to her room in stable and satisfactory condition.  No apparent complications.    Technical Information:    The generator is a [employer] Implantable Cardiac Monitor (Reveal LINQ):  Model #:  LINQ II, Serial #:  [id number].    IMPRESSION:  Successful Implantable of a Implantable Cardiac Monitor (Reveal LINQ) for traumatic syncope.    Electronically signed by Agus Junior MD on 9/16/24

## 2024-09-16 NOTE — DISCHARGE INSTR - DIET

## 2024-09-16 NOTE — PROGRESS NOTES
Physical Therapy  Facility/Department: [facility] PROGRESSIVE CARE  Physical Therapy Initial Assessment    Name: Jacqueline Richardson  : 1950  MRN: [id number]  Date of Service: 2024    Discharge Recommendations:  Continue to assess pending progress          Patient Diagnosis(es): The primary encounter diagnosis was Syncope and collapse. Diagnoses of Elevated d-dimer, Syncope, unspecified syncope type, and Nondisplaced fracture of neck of right femur (HCC) were also pertinent to this visit.  Past Medical History:  has a past medical history of Smoker.  Past Surgical History:  has a past surgical history that includes hip surgery (Right, 9/15/2024).    Assessment  Body Structures, Functions, Activity Limitations Requiring Skilled Therapeutic Intervention: Decreased functional mobility ;Decreased ADL status;Decreased ROM;Decreased strength;Decreased endurance;Decreased balance;Increased pain  Assessment: Pt ABLE TO PARTICIPATE DESPITE PAIN. ABLE TO STAND BRIEFLY WITH ASSIST AND MOVE FEET. WILL PROGRESS AS TOLERATED.  Requires PT Follow-Up: Yes  Activity Tolerance  Activity Tolerance: Patient limited by pain    Plan  Physical Therapy Plan  General Plan: 5-7 times per week  Current Treatment Recommendations: Strengthening, ROM, Balance training, Functional mobility training, Transfer training, Gait training, Safety education & training, Patient/Caregiver education & training, Endurance training, Positioning  Safety Devices  Type of Devices: Bed alarm in place, Call light within reach, Heels elevated for pressure relief, Nurse notified    Restrictions  Restrictions/Precautions  Restrictions/Precautions: Fall Risk, Weight Bearing  Lower Extremity Weight Bearing Restrictions  Right Lower Extremity Weight Bearing: Partial Weight Bearing (50%)     Subjective  Pain: REPORTS 10/10 RIBS, R HIP INC WITH CERTAIN MOVEMENTS  General  Patient assessed for rehabilitation services?: Yes  Diagnosis: R HIP PINNING, L RIB  PAIN  Subjective  Subjective: Pt WILLING TO PARTICIPATE, HESITANT DUE TO PAIN         Social/Functional History  Social/Functional History  Lives With: Spouse  Type of Home: House  Home Layout: One level  Home Access: Stairs to enter with rails  Entrance Stairs - Number of Steps: 3  Entrance Stairs - Rails: Right  Bathroom Shower/Tub: Tub/Shower unit  Bathroom Toilet: Standard  Bathroom Equipment: Grab bars in shower  Home Equipment: None  Has the patient had two or more falls in the past year or any fall with injury in the past year?: Yes  ADL Assistance: Independent  Homemaking Assistance: Independent  Ambulation Assistance: Independent  Transfer Assistance: Independent  Vision/Hearing  Vision  Vision: Within Functional Limits  Hearing  Hearing: Within functional limits    Cognition   Orientation  Overall Orientation Status: Within Normal Limits  Cognition  Overall Cognitive Status: WNL    Objective  Temp: 97.7 °F (36.5 °C)  Pulse: 89  Heart Rate Source: Monitor  Respirations: 16  SpO2: 97 %  O2 Device: None (Room air)  BP: 136/79  MAP (Calculated): 98  BP Location: Left upper arm  BP Method: Automatic  Patient Position: Supine        Gross Assessment  AROM: Generally decreased, functional  Strength: Generally decreased, functional                   Bed mobility  Supine to Sit: Minimal assistance (SLOW, GUARDED DUE TO RIBS)  Sit to Supine: Minimal assistance  Transfers  Sit to Stand: Minimal Assistance  Stand to Sit: Minimal Assistance  Bed to Chair: Minimal assistance  Comment: 2-3 SMALL SIDE STEPS RW, CUES FOR WB, Pt DID WELL BUT FATIGUES QUICKLY, DECLINED UP TO CHAIR        Balance  Sitting - Dynamic: Good;-  Standing - Dynamic: Fair;-          OutComes Score                                                  AM-PAC - Mobility              Tinneti Score       Goals  Short Term Goals  Time Frame for Short Term Goals: 14 DAYS  Short Term Goal 1: BED MOB SUPERVISION  Short Term Goal 2: TRANSFERS SUPERVISION  Short

## 2024-09-16 NOTE — PROGRESS NOTES
Cardiology Progress Note Agus Junior MD      Patient:  Jacqueline Richardson  [id number]    Patient Active Problem List    Diagnosis Date Noted    Smoker      Priority: Low    Syncope 09/14/2024     Priority: Low    Closed right hip fracture (HCC) 09/14/2024     Priority: Low    Diabetes (HCC) 09/14/2024     Priority: Low    Hypertension 09/14/2024     Priority: Low    Hypothyroidism 09/14/2024     Priority: Low    Syncope and collapse 09/14/2024     Priority: Low    Nondisplaced fracture of neck of right femur (HCC) 09/14/2024     Priority: Low       Admit Date:  9/14/2024    Admission Problem List: Present on Admission:   Syncope   Closed right hip fracture (HCC)   Diabetes (HCC)   Hypertension   Hypothyroidism   Syncope and collapse   Smoker      Cardiac Specific Data:  Specialty Problems          Cardiology Problems    Hypertension         1.  Abrupt traumatic syncope with right femoral neck fracture, status post ORIF 9/15/2024, normal LV ejection fraction with mild mitral stenosis.  2.  Active ongoing longstanding tobacco use.  3.  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  4.  Hypertension.    Subjective:  Ms. Richardson is in pain from her left rib with likely rib fracture as well as from her right hip.  No chest pain reported.  She has no memory of events regarding syncope.  Remembers being in the hallway.  Next memory is that of pain while lying on the floor.  No chest pain, lightheadedness, nausea or seizures reported.  Does report that she got a COVID vaccination 2 days ago and has been generally fatigued.  Active ongoing tobacco use at 5 to 7 cigarettes/day.  EKG with sinus tachycardia, left anterior fascicular block    Objective:   /79   Pulse 89   Temp 97.7 °F (36.5 °C) (Temporal)   Resp 16   Ht 1.651 m (5' 5\")   Wt 57.6 kg (127 lb)   SpO2 97%   BMI 21.13 kg/m²       Intake/Output Summary (Last 24 hours) at 9/16/2024 1444  Last data filed at 9/15/2024

## 2024-09-16 NOTE — PLAN OF CARE
Problem: Discharge Planning  Goal: Discharge to home or other facility with appropriate resources  9/16/2024 1150 by Sharon Elkins RN  Outcome: Progressing  9/16/2024 0248 by Karley Cummins RN  Outcome: Progressing     Problem: Pain  Goal: Verbalizes/displays adequate comfort level or baseline comfort level  9/16/2024 1150 by Sharon Elkins RN  Outcome: Progressing  9/16/2024 0248 by Karley Cummins RN  Outcome: Progressing     Problem: Skin/Tissue Integrity  Goal: Absence of new skin breakdown  Description: 1.  Monitor for areas of redness and/or skin breakdown  2.  Assess vascular access sites hourly  3.  Every 4-6 hours minimum:  Change oxygen saturation probe site  4.  Every 4-6 hours:  If on nasal continuous positive airway pressure, respiratory therapy assess nares and determine need for appliance change or resting period.  9/16/2024 1150 by Sharon Elkins RN  Outcome: Progressing  9/16/2024 0248 by Karley Cummins RN  Outcome: Progressing     Problem: Safety - Adult  Goal: Free from fall injury  9/16/2024 1150 by Sharon Elkins RN  Outcome: Progressing  9/16/2024 0248 by Karley Cummins RN  Outcome: Progressing     Problem: Chronic Conditions and Co-morbidities  Goal: Patient's chronic conditions and co-morbidity symptoms are monitored and maintained or improved  9/16/2024 1150 by Sharon Elkins RN  Outcome: Progressing  9/16/2024 0248 by Karley Cummins RN  Outcome: Progressing

## 2024-09-16 NOTE — PROGRESS NOTES
Orthopedic Surgery Progress Note    Jacqueline Richardson  9/16/2024      Subjective:     No acute events overnight.  Pain controlled-reports ongoing left-sided flank pain related to hitting her ribs.  Denies chest pain or shortness of breath.    Objective:     Patient Vitals for the past 24 hrs:   BP Temp Temp src Pulse Resp SpO2 Height Weight   09/16/24 0545 -- -- -- -- -- -- -- 57.6 kg (127 lb)   09/16/24 0354 -- -- -- -- 20 -- -- --   09/16/24 0353 (!) 149/81 97.5 °F (36.4 °C) Temporal 98 14 97 % -- --   09/15/24 2350 121/71 97.5 °F (36.4 °C) Temporal 98 14 95 % -- --   09/15/24 2312 -- -- -- -- 20 -- -- --   09/15/24 2046 -- -- -- -- 18 -- -- --   09/15/24 2024 116/77 97.5 °F (36.4 °C) Temporal 97 14 93 % -- --   09/15/24 1650 -- -- -- -- 16 -- -- --   09/15/24 1615 (!) 152/72 96.8 °F (36 °C) Temporal 95 18 97 % -- --   09/15/24 1415 -- -- -- -- 16 -- -- --   09/15/24 1250 -- -- -- -- 16 -- -- --   09/15/24 1219 (!) 145/86 98 °F (36.7 °C) Temporal 96 15 90 % -- --   09/15/24 1200 (!) 157/91 98.1 °F (36.7 °C) -- 95 14 98 % -- --   09/15/24 1155 (!) 168/81 -- -- 95 21 96 % -- --   09/15/24 1150 (!) 168/81 -- -- 94 19 97 % -- --   09/15/24 1149 (!) 158/97 -- -- 94 16 98 % -- --   09/15/24 1145 (!) 158/97 -- -- 95 16 97 % -- --   09/15/24 1140 (!) 152/101 -- -- (!) 102 14 96 % -- --   09/15/24 1139 (!) 152/101 -- -- (!) 103 18 98 % -- --   09/15/24 1135 (!) 154/84 -- -- 92 14 99 % -- --   09/15/24 1130 (!) 150/70 -- -- 93 14 97 % -- --   09/15/24 1125 (!) 155/88 -- -- 98 13 91 % -- --   09/15/24 1120 (!) 155/88 98.9 °F (37.2 °C) Temporal 100 14 -- -- --   09/15/24 1020 (!) 148/92 -- -- (!) 107 19 (!) 88 % -- --   09/15/24 1015 (!) 148/126 -- -- (!) 105 19 (!) 88 % -- --   09/15/24 1010 (!) 149/69 -- -- (!) 104 23 (!) 89 % -- --   09/15/24 1005 (!) 144/83 -- -- (!) 103 19 (!) 89 % -- --   09/15/24 0959 -- 99.9 °F (37.7 °C) Temporal (!) 101 21 91 % -- --   09/15/24 0958 -- 99.9 °F (37.7 °C) Temporal -- -- -- -- --    09/15/24 0912 (!) 145/73 -- -- -- -- -- 1.651 m (5' 5\") 56.2 kg (124 lb)   09/15/24 0845 -- -- -- -- 16 -- -- --   09/15/24 0801 (!) 145/73 97.9 °F (36.6 °C) Temporal (!) 104 20 93 % -- --       General: Awake, alert, no acute distress  Respiratory: Nonlabored respiration  Cardiovascular: Regular rate  Musculoskeletal:  RLE: Dressing clean, dry and intact.  Stable neurovascular exam to the right lower extremity.       Data Review:  Recent Labs     09/14/24  1358 09/15/24  0848   HGB 14.1 12.5     Recent Labs     09/15/24  0848   *   K 4.3   CREATININE 0.8       Assessment:     73 y/o F POD#1 s/p R CRPP, syncopal episode, history of insulin-dependent diabetes mellitus, hypertension, hypothyroidism and chronic tobacco use    Plan:     1.  Pain: Continue current regimen.  Discussed with nursing staff about lidocaine patch for left flank pain.  2.  Acute post-op blood loss anemia: No recent H&H, hypertensive yesterday afternoon, somewhat improved overnight, otherwise asymptomatic   3.  DVT prophylaxis: PAS boots, Lovenox in house with plans to transition to oral anticoagulation at discharge for 1 month  4.  Physical therapy/Occupational therapy  5.  Activity: Weight bearing as tolerated, ice/elevate  6.  Dressing: Plan to keep dressing in place for 2 weeks unless dressing loses adhesion or becomes saturated, then apply new Mepilex dressing  7.  Disposition: Okay for discharge from orthopedic standpoint when arrangements made, follow-up in 2 weeks       Electronically signed by Casey Bowen MD on 9/16/2024 at 7:07 AM

## 2024-09-16 NOTE — PROGRESS NOTES
Occupational Therapy Initial Assessment  Date: 2024   Patient Name: Jacqueline Richardson  MRN: [id number]     : 1950    Date of Service: 2024    Discharge Recommendations:  Continue to assess pending progress, 24 hour supervision or assist, Patient would benefit from continued therapy after discharge       Assessment   Performance deficits / Impairments: Decreased functional mobility ;Decreased ADL status;Decreased ROM;Decreased strength;Decreased balance;Decreased posture;Decreased endurance;Decreased high-level IADLs  Assessment: Evaluation completed and tx initiated.  The patient would benefit from further skilled therapy to upgrade safety and functional independence. Pt was willing to work with therapy. Pt has L rib pain following the fall. Pt reported pain 10/10 with movement. Pt was able to stand at EOB with min/CGA using RW. Pt took a few small side steps to HOB. Pt was limited due to pain at the L rib during eval and tx. Once pain is under control, pt will be able to progress to ambulatory ADL and t/fers using RW.  Treatment Diagnosis: Percutaneous screw fixation of R minimally displaced subcapial fx of neck of R femur, syncope and collapse  Prognosis: Good  Decision Making: Low Complexity  REQUIRES OT FOLLOW-UP: Yes  Activity Tolerance  Activity Tolerance: Patient Tolerated treatment well;Patient limited by pain              Patient Diagnosis(es): The primary encounter diagnosis was Syncope and collapse. Diagnoses of Elevated d-dimer, Syncope, unspecified syncope type, and Nondisplaced fracture of neck of right femur (HCC) were also pertinent to this visit.    Past Medical History:   Past Medical History:   Diagnosis Date    Smoker         Past Surgical History: No past surgical history on file.    Treatment Diagnosis: Percutaneous screw fixation of R minimally displaced subcapial fx of neck of R femur, syncope and collapse      Restrictions  Restrictions/Precautions  Restrictions/Precautions: Fall

## 2024-09-17 ENCOUNTER — APPOINTMENT (OUTPATIENT)
Dept: CT IMAGING | Age: 74
DRG: 481 | End: 2024-09-17
Payer: MEDICARE

## 2024-09-17 ENCOUNTER — APPOINTMENT (OUTPATIENT)
Dept: NUCLEAR MEDICINE | Age: 74
DRG: 481 | End: 2024-09-17
Payer: MEDICARE

## 2024-09-17 LAB
ALBUMIN SERPL-MCNC: 3.5 G/DL (ref 3.5–5.2)
ALP SERPL-CCNC: 167 U/L (ref 35–104)
ALT SERPL-CCNC: 16 U/L (ref 5–33)
AMMONIA PLAS-SCNC: 27 UMOL/L (ref 11–51)
ANION GAP SERPL CALCULATED.3IONS-SCNC: 14 MMOL/L (ref 7–19)
AST SERPL-CCNC: 53 U/L (ref 5–32)
BASOPHILS # BLD: 0.1 K/UL (ref 0–0.2)
BASOPHILS NFR BLD: 0.5 % (ref 0–1)
BILIRUB DIRECT SERPL-MCNC: 0.2 MG/DL (ref 0–0.3)
BILIRUB INDIRECT SERPL-MCNC: 0.3 MG/DL (ref 0–1)
BILIRUB SERPL-MCNC: 0.5 MG/DL (ref 0.2–1.2)
BUN SERPL-MCNC: 9 MG/DL (ref 8–23)
CALCIUM SERPL-MCNC: 8.1 MG/DL (ref 8.8–10.2)
CHLORIDE SERPL-SCNC: 98 MMOL/L (ref 98–111)
CO2 SERPL-SCNC: 22 MMOL/L (ref 22–29)
CREAT SERPL-MCNC: 0.6 MG/DL (ref 0.5–0.9)
ECHO BSA: 1.61 M2
ECHO BSA: 1.61 M2
EOSINOPHIL # BLD: 0 K/UL (ref 0–0.6)
EOSINOPHIL NFR BLD: 0.2 % (ref 0–5)
ERYTHROCYTE [DISTWIDTH] IN BLOOD BY AUTOMATED COUNT: 13.5 % (ref 11.5–14.5)
GLUCOSE BLD-MCNC: 130 MG/DL (ref 70–99)
GLUCOSE BLD-MCNC: 179 MG/DL (ref 70–99)
GLUCOSE BLD-MCNC: 217 MG/DL (ref 70–99)
GLUCOSE BLD-MCNC: 80 MG/DL (ref 70–99)
GLUCOSE SERPL-MCNC: 71 MG/DL (ref 70–99)
HCT VFR BLD AUTO: 35.7 % (ref 37–47)
HGB BLD-MCNC: 11.2 G/DL (ref 12–16)
IMM GRANULOCYTES # BLD: 0.1 K/UL
LYMPHOCYTES # BLD: 1.8 K/UL (ref 1.1–4.5)
LYMPHOCYTES NFR BLD: 16 % (ref 20–40)
MCH RBC QN AUTO: 32.7 PG (ref 27–31)
MCHC RBC AUTO-ENTMCNC: 31.4 G/DL (ref 33–37)
MCV RBC AUTO: 104.4 FL (ref 81–99)
MONOCYTES # BLD: 0.9 K/UL (ref 0–0.9)
MONOCYTES NFR BLD: 7.8 % (ref 0–10)
NEUTROPHILS # BLD: 8.6 K/UL (ref 1.5–7.5)
NEUTS SEG NFR BLD: 75 % (ref 50–65)
NUC STRESS EJECTION FRACTION: 90 %
PERFORMED ON: ABNORMAL
PERFORMED ON: NORMAL
PHOSPHATE SERPL-MCNC: 1.6 MG/DL (ref 2.5–4.5)
PLATELET # BLD AUTO: 290 K/UL (ref 130–400)
PMV BLD AUTO: 9.5 FL (ref 9.4–12.3)
POTASSIUM SERPL-SCNC: 3.4 MMOL/L (ref 3.5–5)
PROT SERPL-MCNC: 6.2 G/DL (ref 6.4–8.3)
RBC # BLD AUTO: 3.42 M/UL (ref 4.2–5.4)
SODIUM SERPL-SCNC: 134 MMOL/L (ref 136–145)
STRESS BASELINE DIAS BP: 92 MMHG
STRESS BASELINE HR: 82 BPM
STRESS BASELINE SYS BP: 154 MMHG
STRESS EXERCISE DUR MIN: 5 MIN
STRESS EXERCISE DUR SEC: 0 SEC
STRESS PEAK DIAS BP: 95 MMHG
STRESS PEAK SYS BP: 157 MMHG
STRESS PERCENT HR ACHIEVED: 71 %
STRESS POST PEAK HR: 104 BPM
STRESS RATE PRESSURE PRODUCT: NORMAL BPM*MMHG
STRESS STAGE 1 BP: NORMAL MMHG
STRESS STAGE 1 DURATION: 1 MIN:SEC
STRESS STAGE 1 HR: 101 BPM
STRESS STAGE 2 BP: NORMAL MMHG
STRESS STAGE 2 DURATION: 1 MIN:SEC
STRESS STAGE 2 HR: 101 BPM
STRESS STAGE 3 BP: NORMAL MMHG
STRESS STAGE 3 DURATION: 1 MIN:SEC
STRESS STAGE 3 HR: 104 BPM
STRESS STAGE 4 BP: NORMAL MMHG
STRESS STAGE 4 DURATION: 1 MIN:SEC
STRESS STAGE 4 HR: 101 BPM
STRESS STAGE 5 BP: NORMAL MMHG
STRESS STAGE 5 DURATION: 1 MIN:SEC
STRESS STAGE 5 HR: 102 BPM
STRESS STAGE RECOVERY 1 BP: NORMAL MMHG
STRESS STAGE RECOVERY 1 DURATION: 1 MIN:SEC
STRESS STAGE RECOVERY 1 HR: 99 BPM
STRESS TARGET HR: 147 BPM
TSH SERPL DL<=0.005 MIU/L-ACNC: 1.74 UIU/ML (ref 0.27–4.2)
VAS LEFT CCA MID EDV: 18 CM/S
VAS LEFT CCA MID PSV: 61.9 CM/S
VAS LEFT CCA PROX EDV: 17.6 CM/S
VAS LEFT CCA PROX PSV: 76.8 CM/S
VAS LEFT ECA EDV: 19.8 CM/S
VAS LEFT ECA PSV: 90 CM/S
VAS LEFT ICA DIST EDV: 32 CM/S
VAS LEFT ICA DIST PSV: 75.5 CM/S
VAS LEFT ICA MID EDV: 27 CM/S
VAS LEFT ICA MID PSV: 72.1 CM/S
VAS LEFT ICA PROX EDV: 19.2 CM/S
VAS LEFT ICA PROX PSV: 45.9 CM/S
VAS LEFT VERTEBRAL EDV: 16.7 CM/S
VAS LEFT VERTEBRAL PSV: 59.7 CM/S
VAS RIGHT CCA MID EDV: 20.1 CM/S
VAS RIGHT CCA MID PSV: 75.7 CM/S
VAS RIGHT CCA PROX EDV: 17.7 CM/S
VAS RIGHT CCA PROX PSV: 59.5 CM/S
VAS RIGHT ECA EDV: 16.8 CM/S
VAS RIGHT ECA PSV: 86.4 CM/S
VAS RIGHT ICA DIST EDV: 27.7 CM/S
VAS RIGHT ICA DIST PSV: 66.3 CM/S
VAS RIGHT ICA MID EDV: 31 CM/S
VAS RIGHT ICA MID PSV: 79.1 CM/S
VAS RIGHT ICA PROX EDV: 11.6 CM/S
VAS RIGHT ICA PROX PSV: 36.8 CM/S
VAS RIGHT VERTEBRAL EDV: 18.6 CM/S
VAS RIGHT VERTEBRAL PSV: 48.7 CM/S
WBC # BLD AUTO: 11.5 K/UL (ref 4.8–10.8)

## 2024-09-17 PROCEDURE — 82962 GLUCOSE BLOOD TEST: CPT

## 2024-09-17 PROCEDURE — 82140 ASSAY OF AMMONIA: CPT

## 2024-09-17 PROCEDURE — 6360000002 HC RX W HCPCS: Performed by: INTERNAL MEDICINE

## 2024-09-17 PROCEDURE — 85025 COMPLETE CBC W/AUTO DIFF WBC: CPT

## 2024-09-17 PROCEDURE — 97535 SELF CARE MNGMENT TRAINING: CPT

## 2024-09-17 PROCEDURE — 80048 BASIC METABOLIC PNL TOTAL CA: CPT

## 2024-09-17 PROCEDURE — 97530 THERAPEUTIC ACTIVITIES: CPT

## 2024-09-17 PROCEDURE — 93016 CV STRESS TEST SUPVJ ONLY: CPT | Performed by: INTERNAL MEDICINE

## 2024-09-17 PROCEDURE — 2140000000 HC CCU INTERMEDIATE R&B

## 2024-09-17 PROCEDURE — 6370000000 HC RX 637 (ALT 250 FOR IP): Performed by: ORTHOPAEDIC SURGERY

## 2024-09-17 PROCEDURE — 84100 ASSAY OF PHOSPHORUS: CPT

## 2024-09-17 PROCEDURE — 93017 CV STRESS TEST TRACING ONLY: CPT

## 2024-09-17 PROCEDURE — 3430000000 HC RX DIAGNOSTIC RADIOPHARMACEUTICAL: Performed by: INTERNAL MEDICINE

## 2024-09-17 PROCEDURE — 93018 CV STRESS TEST I&R ONLY: CPT | Performed by: INTERNAL MEDICINE

## 2024-09-17 PROCEDURE — 6370000000 HC RX 637 (ALT 250 FOR IP): Performed by: INTERNAL MEDICINE

## 2024-09-17 PROCEDURE — 99232 SBSQ HOSP IP/OBS MODERATE 35: CPT | Performed by: INTERNAL MEDICINE

## 2024-09-17 PROCEDURE — 84443 ASSAY THYROID STIM HORMONE: CPT

## 2024-09-17 PROCEDURE — 80076 HEPATIC FUNCTION PANEL: CPT

## 2024-09-17 PROCEDURE — 93970 EXTREMITY STUDY: CPT | Performed by: SURGERY

## 2024-09-17 PROCEDURE — 78452 HT MUSCLE IMAGE SPECT MULT: CPT

## 2024-09-17 PROCEDURE — 93880 EXTRACRANIAL BILAT STUDY: CPT | Performed by: SURGERY

## 2024-09-17 PROCEDURE — 70450 CT HEAD/BRAIN W/O DYE: CPT

## 2024-09-17 PROCEDURE — 78452 HT MUSCLE IMAGE SPECT MULT: CPT | Performed by: INTERNAL MEDICINE

## 2024-09-17 PROCEDURE — A9502 TC99M TETROFOSMIN: HCPCS | Performed by: INTERNAL MEDICINE

## 2024-09-17 PROCEDURE — 36415 COLL VENOUS BLD VENIPUNCTURE: CPT

## 2024-09-17 RX ORDER — REGADENOSON 0.08 MG/ML
0.4 INJECTION, SOLUTION INTRAVENOUS
Status: COMPLETED | OUTPATIENT
Start: 2024-09-17 | End: 2024-09-17

## 2024-09-17 RX ORDER — LEVOTHYROXINE SODIUM 100 UG/1
100 TABLET ORAL DAILY
Status: DISCONTINUED | OUTPATIENT
Start: 2024-09-17 | End: 2024-09-18 | Stop reason: HOSPADM

## 2024-09-17 RX ORDER — PANTOPRAZOLE SODIUM 40 MG/1
40 TABLET, DELAYED RELEASE ORAL
Status: DISCONTINUED | OUTPATIENT
Start: 2024-09-17 | End: 2024-09-18 | Stop reason: HOSPADM

## 2024-09-17 RX ADMIN — ENOXAPARIN SODIUM 60 MG: 100 INJECTION SUBCUTANEOUS at 10:13

## 2024-09-17 RX ADMIN — POLYETHYLENE GLYCOL 3350 17 G: 17 POWDER, FOR SOLUTION ORAL at 10:24

## 2024-09-17 RX ADMIN — PANCRELIPASE LIPASE, PANCRELIPASE PROTEASE, PANCRELIPASE AMYLASE 5000 UNITS: 5000; 17000; 24000 CAPSULE, DELAYED RELEASE ORAL at 17:12

## 2024-09-17 RX ADMIN — INSULIN LISPRO 1 UNITS: 100 INJECTION, SOLUTION INTRAVENOUS; SUBCUTANEOUS at 17:12

## 2024-09-17 RX ADMIN — PANTOPRAZOLE SODIUM 40 MG: 40 TABLET, DELAYED RELEASE ORAL at 10:15

## 2024-09-17 RX ADMIN — HYDROCODONE BITARTRATE AND ACETAMINOPHEN 1 TABLET: 5; 325 TABLET ORAL at 10:15

## 2024-09-17 RX ADMIN — REGADENOSON 0.4 MG: 0.08 INJECTION, SOLUTION INTRAVENOUS at 09:46

## 2024-09-17 RX ADMIN — TETROFOSMIN 8 MILLICURIE: 0.23 INJECTION, POWDER, LYOPHILIZED, FOR SOLUTION INTRAVENOUS at 11:54

## 2024-09-17 RX ADMIN — HYDROCODONE BITARTRATE AND ACETAMINOPHEN 1 TABLET: 5; 325 TABLET ORAL at 23:07

## 2024-09-17 RX ADMIN — LEVOTHYROXINE SODIUM 100 MCG: 100 TABLET ORAL at 10:15

## 2024-09-17 RX ADMIN — PANCRELIPASE LIPASE, PANCRELIPASE PROTEASE, PANCRELIPASE AMYLASE 20000 UNITS: 20000; 63000; 84000 CAPSULE, DELAYED RELEASE ORAL at 17:12

## 2024-09-17 RX ADMIN — ENOXAPARIN SODIUM 60 MG: 100 INJECTION SUBCUTANEOUS at 20:25

## 2024-09-17 RX ADMIN — INSULIN GLARGINE 12 UNITS: 100 INJECTION, SOLUTION SUBCUTANEOUS at 20:28

## 2024-09-17 RX ADMIN — TETROFOSMIN 24 MILLICURIE: 0.23 INJECTION, POWDER, LYOPHILIZED, FOR SOLUTION INTRAVENOUS at 11:53

## 2024-09-17 RX ADMIN — HYDROCODONE BITARTRATE AND ACETAMINOPHEN 1 TABLET: 5; 325 TABLET ORAL at 17:11

## 2024-09-17 ASSESSMENT — PAIN DESCRIPTION - ORIENTATION
ORIENTATION: LEFT

## 2024-09-17 ASSESSMENT — PAIN SCALES - GENERAL
PAINLEVEL_OUTOF10: 8
PAINLEVEL_OUTOF10: 9
PAINLEVEL_OUTOF10: 9
PAINLEVEL_OUTOF10: 0
PAINLEVEL_OUTOF10: 6
PAINLEVEL_OUTOF10: 5

## 2024-09-17 ASSESSMENT — PAIN DESCRIPTION - LOCATION
LOCATION: RIB CAGE

## 2024-09-17 ASSESSMENT — PAIN DESCRIPTION - DESCRIPTORS
DESCRIPTORS: SHARP
DESCRIPTORS: ACHING;DISCOMFORT;SORE
DESCRIPTORS: NAGGING;HEAVINESS

## 2024-09-17 ASSESSMENT — PAIN - FUNCTIONAL ASSESSMENT
PAIN_FUNCTIONAL_ASSESSMENT: PREVENTS OR INTERFERES SOME ACTIVE ACTIVITIES AND ADLS
PAIN_FUNCTIONAL_ASSESSMENT: PREVENTS OR INTERFERES SOME ACTIVE ACTIVITIES AND ADLS

## 2024-09-17 ASSESSMENT — PAIN DESCRIPTION - ONSET: ONSET: ON-GOING

## 2024-09-17 ASSESSMENT — PAIN DESCRIPTION - FREQUENCY: FREQUENCY: CONTINUOUS

## 2024-09-17 ASSESSMENT — PAIN DESCRIPTION - PAIN TYPE: TYPE: ACUTE PAIN

## 2024-09-17 NOTE — PROGRESS NOTES
09/17/24 1615   Encounter Summary   Encounter Overview/Reason Initial Encounter   Service Provided For Patient  (in PCU)   Referral/Consult From Rounding   Support System Spouse   Complexity of Encounter Low   Begin Time 1500   End Time  1530   Total Time Calculated 30 min   Spiritual/Emotional needs   Type Spiritual Support   Assessment/Intervention/Outcome   Assessment Hopeful;Concerns with suffering   Intervention Active listening;Discussed belief system/Sikh practices/jani;Explored/Affirmed feelings, thoughts, concerns;Prayer (assurance of)/[place]   Outcome Expressed feelings, needs, and concerns;Encouraged   Plan and Referrals   Plan/Referrals Continue Support (comment)   Does the patient have a [facility] PCP? Yes    to follow up after discharge? No     Electronically signed by Chaplain Concha Sharma on 9/17/2024 at 4:17 PM

## 2024-09-17 NOTE — PROGRESS NOTES
Cardiology Progress Note Agus Junior MD      Patient:  Jacqueline Richardson  [id number]    Patient Active Problem List    Diagnosis Date Noted    Smoker      Priority: Low    Syncope 09/14/2024     Priority: Low    Closed right hip fracture (HCC) 09/14/2024     Priority: Low    Diabetes (HCC) 09/14/2024     Priority: Low    Hypertension 09/14/2024     Priority: Low    Hypothyroidism 09/14/2024     Priority: Low    Syncope and collapse 09/14/2024     Priority: Low    Nondisplaced fracture of neck of right femur (HCC) 09/14/2024     Priority: Low       Admit Date:  9/14/2024    Admission Problem List: Present on Admission:   Syncope   Closed right hip fracture (HCC)   Diabetes (HCC)   Hypertension   Hypothyroidism   Syncope and collapse   Smoker      Cardiac Specific Data:  Specialty Problems          Cardiology Problems    Hypertension         1.  Abrupt traumatic syncope with right femoral neck fracture, status post ORIF 9/15/2024, normal LV ejection fraction with mild mitral stenosis, ILR placement 9/16/2024, negative Lexiscan study 9/17/2024.  2.  Active ongoing longstanding tobacco use.  3.  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  4.  Hypertension.    Subjective:  Ms. Richardson continues to have pain and discomfort specifically left-sided rib cage.  Stress test done today.    Objective:   BP (!) 154/86   Pulse 85   Temp 97.2 °F (36.2 °C) (Temporal)   Resp 16   Ht 1.651 m (5' 5\")   Wt 56.6 kg (124 lb 11.2 oz)   SpO2 100%   BMI 20.75 kg/m²       Intake/Output Summary (Last 24 hours) at 9/17/2024 1743  Last data filed at 9/17/2024 1408  Gross per 24 hour   Intake 120 ml   Output --   Net 120 ml       Prior to Admission medications    Medication Sig Start Date End Date Taking? Authorizing Provider   lipase-protease-amylase (CREON) 6000-18818 units delayed release capsule Take 6 capsules by mouth 3 times daily (with meals)   Yes Provider, MD Pratibha   insulin lispro  quality: Adequate.  - Central (Main/Lobar/Interlobar): No embolus.  - Peripheral (Segmental/Subsegmental): No embolus.  - Right ventricle/Left ventricle ratio: Normal.  Lines, Tubes, Devices: None. Lung Parenchyma and Airways: Central airways are patent without endobronchial lesion.  No focal consolidation or interstitial disease.  No suspicious pulmonary nodule. Calcified granuloma in the medial left lower lobe. Pleural Space: No pleural effusion.  No pneumothorax. Thoracic Inlet, Mediastinum, and Danae: Thyroid gland is normal.  No enlarged lymph node or abnormal density. Heart, Vessels, and Pericardium: The heart is normal size without pericardial effusion.  The aorta shows atherosclerotic calcific changes without discrete aneurysm or dissection. Bones and Soft Tissues: There is no fracture or lytic lesion.  Chest wall soft tissues are unremarkable. Upper Abdomen:  .  Mild pneumobilia.  The cholecystectomy.  Nonobstructing left renal calculi.  The upper abdomen visualized is otherwise unremarkable.      1.  No evidence of pulmonary artery embolism. 2.  No acute abnormality of the chest. 3.  Mild pneumobilia. 4.  Nonobstructing left renal calculi.  All CT scans are performed using dose optimization techniques as appropriate to the performed exam and include at least one of the following: Automated exposure control, adjustment of the mA and/or kV according to size, and the use of iterative reconstruction technique.  ______________________________________ Electronically signed by: FORREST ARGUELLES M.D. Date:     09/14/2024 Time:    16:17     CT THORACIC SPINE WO CONTRAST    Result Date: 9/14/2024  EXAM:  CT THORACIC SPINE WITHOUT CONTRAST  HISTORY:  Trauma  COMPARISON:  None.  TECHNIQUE: Axial CT imaging of the thoracic spine was performed without contrast.  Sagittal and coronal reconstructed images were made to better evaluate pathology.  FINDINGS: There is no acute fracture.  The vertebral endplates all appear

## 2024-09-17 NOTE — PROGRESS NOTES
Hospitalist Progress Note        PCP: Jodi Fine, APRN - CNP    Date of Admission: 9/14/2024    Length of Stay: 3    Chief Complaint: 74 y.o. female who presented to [facility] ED for evaluation of right hip injury following syncope at home. Pt has history of diabetes, hypertension, hypothyroidism and pancreatitis s/p pancreatic resection and on insulin at home.       Ddimer >20, but no PE on CTPA          Subjective:     S/p perc screw fixation R minimally displaced subcapital femoral neck fracture with Dr Bowen 9/15.      Confused and impulsive last night. Better today.                Medications:  Reviewed    Infusion Medications    sodium chloride      dextrose       Scheduled Medications    levothyroxine  100 mcg Oral Daily    pantoprazole  40 mg Oral QAM AC    lipase-protease-amylase  5,000 Units Oral TID WC    lipase-protease-amylase  20,000 Units Oral TID WC    lidocaine  1 patch Topical Daily    enoxaparin  1 mg/kg SubCUTAneous BID    insulin glargine  12 Units SubCUTAneous Nightly    sodium chloride flush  5-40 mL IntraVENous 2 times per day    insulin lispro  0-4 Units SubCUTAneous TID WC    insulin lispro  0-4 Units SubCUTAneous Nightly     PRN Meds: technetium tetrofosmin, technetium tetrofosmin, HYDROcodone 5 mg - acetaminophen, sodium chloride flush, sodium chloride, potassium chloride **OR** potassium alternative oral replacement **OR** potassium chloride, magnesium sulfate, ondansetron **OR** ondansetron, polyethylene glycol, acetaminophen **OR** acetaminophen, glucose, dextrose bolus **OR** dextrose bolus, glucagon (rDNA), dextrose, naloxone      Intake/Output Summary (Last 24 hours) at 9/17/2024 1145  Last data filed at 9/16/2024 1720  Gross per 24 hour   Intake --   Output 275 ml   Net -275 ml       Physical Exam Performed:    BP (!) 141/86   Pulse 89   Temp 97.3 °F (36.3 °C) (Temporal)   Resp 16   Ht 1.651 m (5' 5\")   Wt 56.6 kg (124 lb 11.2 oz)   SpO2 100%   BMI 20.75 kg/m²     General

## 2024-09-17 NOTE — ACP (ADVANCE CARE PLANNING)
Advance Care Planning     Advance Care Planning Inpatient Note  Spiritual [facility] Department    Today's Date: 9/17/2024  Unit: [facility] PROGRESSIVE CARE    Received request from rounding.  Upon review of chart and communication with care team, patient's decision making abilities are not in question.. Patient was/were present in the room during visit.    Goals of ACP Conversation:  Facilitate a discussion related to patient's goals of care as they align with the patient's values and beliefs.    Health Care Decision Makers:       Primary Decision Maker: Valentino Richardson - [facility] - [phone]    Summary:  Verified Healthcare Decision Maker    Advance Care Planning Documents (Patient Wishes):  None     Assessment:  Ms. Richardson has spiritual support. Named her  as primary decision maker.    Interventions:  Encouraged ongoing ACP conversation with future decision makers and loved ones    Care Preferences Communicated:   No    Outcomes/Plan:  ACP Discussion: Completed    Electronically signed by Chaplain Kern Mai on 9/17/2024 at 4:18 PM

## 2024-09-17 NOTE — PROGRESS NOTES
Occupational Therapy     09/17/24 1400   Subjective   Subjective Pt in bed upon arrival for therapy. Pt agreeable to participate.   Pain Assessment   Pain Assessment 0-10   Pain Level 6   Pain Location Rib cage   Pain Orientation Left   Pain Descriptors Aching;Discomfort;Sore   Functional Pain Assessment Prevents or interferes some active activities and ADLs   Pain Type Acute pain   Pain Frequency Continuous   Pain Onset On-going   Non-Pharmaceutical Pain Intervention(s) Ambulation/Increased Activity;Repositioned;Rest   Response to Pain Intervention Patient satisfied   Cognition   Overall Cognitive Status Exceptions   Arousal/Alertness Appears intact   Following Commands Follows one step commands with increased time;Follows one step commands with repetition   Attention Span Difficulty dividing attention   Memory Impaired;Decreased recall of precautions   Safety Judgement Decreased awareness of need for assistance;Decreased awareness of need for safety   Problem Solving Assistance required to implement solutions;Assistance required to correct errors made   Insights Impaired   Initiation Impaired   Sequencing Impaired   Orientation   Overall Orientation Status WFL   Bed Mobility Training   Bed Mobility Training Yes   Overall Level of Assistance Stand-by assistance   Interventions Verbal cues   Supine to Sit Stand-by assistance   Sit to Supine Stand-by assistance   Scooting Stand-by assistance   Transfer Training   Transfer Training Yes   Overall Level of Assistance Minimum assistance;Contact-guard assistance   Interventions Verbal cues;Tactile cues;Safety awareness training;Manual cues   Sit to Stand Minimum assistance;Contact-guard assistance   Stand to Sit Minimum assistance;Contact-guard assistance   Balance   Sitting Intact   Standing With support   ADL   Feeding Modified independent ;Setup   Grooming Supervision;Setup   UE Bathing Stand by assistance   LE Bathing Minimal assistance   UE Dressing Stand by  assistance   LE Dressing Minimal assistance   Toileting Minimal assistance;Contact guard assistance   Functional Mobility Minimal assistance;Contact guard assistance   Functional Mobility Skilled Clinical Factors RW   Assessment   Assessment Tx focused on sitting EOB o change gown and don socks with Min assist. Pt insstructed on functional mobility and transfers in room with Min-CGA at RW level with cues for 50% WB to LLE. Pt required cues for attention to tasks and safety throughout tx.   Activity Tolerance Patient tolerated treatment well   Discharge Recommendations Continue to assess pending progress   Occupational Therapy Plan   Times Per Week 3-5   Times Per Day Once a day   OT Plan of Care   Tuesday X     Electronically signed by DIANA Gross on 9/17/2024 at 2:46 PM

## 2024-09-17 NOTE — PROGRESS NOTES
Granddaughter POLO Can, here upon pt request. States behavior unusual for pt and likely from sleep deprivation. Will stay overnight. Pt now in bed with eyes closed.

## 2024-09-18 VITALS
HEART RATE: 76 BPM | RESPIRATION RATE: 16 BRPM | OXYGEN SATURATION: 99 % | TEMPERATURE: 97.3 F | HEIGHT: 65 IN | BODY MASS INDEX: 21.14 KG/M2 | WEIGHT: 126.9 LBS | SYSTOLIC BLOOD PRESSURE: 143 MMHG | DIASTOLIC BLOOD PRESSURE: 75 MMHG

## 2024-09-18 LAB
ALBUMIN SERPL-MCNC: 3.4 G/DL (ref 3.5–5.2)
ANION GAP SERPL CALCULATED.3IONS-SCNC: 12 MMOL/L (ref 7–19)
BASOPHILS # BLD: 0.1 K/UL (ref 0–0.2)
BASOPHILS NFR BLD: 0.6 % (ref 0–1)
BUN SERPL-MCNC: 7 MG/DL (ref 8–23)
CALCIUM SERPL-MCNC: 8 MG/DL (ref 8.8–10.2)
CHLORIDE SERPL-SCNC: 102 MMOL/L (ref 98–111)
CO2 SERPL-SCNC: 23 MMOL/L (ref 22–29)
CREAT SERPL-MCNC: 0.5 MG/DL (ref 0.5–0.9)
EOSINOPHIL # BLD: 0.1 K/UL (ref 0–0.6)
EOSINOPHIL NFR BLD: 1 % (ref 0–5)
ERYTHROCYTE [DISTWIDTH] IN BLOOD BY AUTOMATED COUNT: 13.2 % (ref 11.5–14.5)
GLUCOSE BLD-MCNC: 205 MG/DL (ref 70–99)
GLUCOSE BLD-MCNC: 69 MG/DL (ref 70–99)
GLUCOSE SERPL-MCNC: 59 MG/DL (ref 70–99)
HCT VFR BLD AUTO: 33.4 % (ref 37–47)
HGB BLD-MCNC: 11.2 G/DL (ref 12–16)
IMM GRANULOCYTES # BLD: 0 K/UL
INR PPP: 1.06 (ref 0.88–1.18)
LYMPHOCYTES # BLD: 2.4 K/UL (ref 1.1–4.5)
LYMPHOCYTES NFR BLD: 24 % (ref 20–40)
MCH RBC QN AUTO: 32.3 PG (ref 27–31)
MCHC RBC AUTO-ENTMCNC: 33.5 G/DL (ref 33–37)
MCV RBC AUTO: 96.3 FL (ref 81–99)
MONOCYTES # BLD: 1 K/UL (ref 0–0.9)
MONOCYTES NFR BLD: 10 % (ref 0–10)
NEUTROPHILS # BLD: 6.5 K/UL (ref 1.5–7.5)
NEUTS SEG NFR BLD: 64.1 % (ref 50–65)
PERFORMED ON: ABNORMAL
PERFORMED ON: ABNORMAL
PHOSPHATE SERPL-MCNC: 1.5 MG/DL (ref 2.5–4.5)
PLATELET # BLD AUTO: 307 K/UL (ref 130–400)
PMV BLD AUTO: 9.9 FL (ref 9.4–12.3)
POTASSIUM SERPL-SCNC: 3.7 MMOL/L (ref 3.5–5)
PROTHROMBIN TIME: 13.5 SEC (ref 12–14.6)
RBC # BLD AUTO: 3.47 M/UL (ref 4.2–5.4)
SODIUM SERPL-SCNC: 137 MMOL/L (ref 136–145)
WBC # BLD AUTO: 10.1 K/UL (ref 4.8–10.8)

## 2024-09-18 PROCEDURE — 2500000003 HC RX 250 WO HCPCS: Performed by: INTERNAL MEDICINE

## 2024-09-18 PROCEDURE — 2580000003 HC RX 258: Performed by: ORTHOPAEDIC SURGERY

## 2024-09-18 PROCEDURE — 6370000000 HC RX 637 (ALT 250 FOR IP): Performed by: ORTHOPAEDIC SURGERY

## 2024-09-18 PROCEDURE — 2580000003 HC RX 258: Performed by: INTERNAL MEDICINE

## 2024-09-18 PROCEDURE — 85025 COMPLETE CBC W/AUTO DIFF WBC: CPT

## 2024-09-18 PROCEDURE — 80069 RENAL FUNCTION PANEL: CPT

## 2024-09-18 PROCEDURE — 6360000002 HC RX W HCPCS: Performed by: INTERNAL MEDICINE

## 2024-09-18 PROCEDURE — 85610 PROTHROMBIN TIME: CPT

## 2024-09-18 PROCEDURE — 6370000000 HC RX 637 (ALT 250 FOR IP): Performed by: INTERNAL MEDICINE

## 2024-09-18 PROCEDURE — 82962 GLUCOSE BLOOD TEST: CPT

## 2024-09-18 PROCEDURE — 36415 COLL VENOUS BLD VENIPUNCTURE: CPT

## 2024-09-18 RX ORDER — INSULIN GLARGINE 100 [IU]/ML
7 INJECTION, SOLUTION SUBCUTANEOUS NIGHTLY
Status: DISCONTINUED | OUTPATIENT
Start: 2024-09-18 | End: 2024-09-18 | Stop reason: HOSPADM

## 2024-09-18 RX ORDER — WARFARIN SODIUM 5 MG/1
5 TABLET ORAL DAILY
Qty: 30 TABLET | Refills: 3 | Status: SHIPPED | OUTPATIENT
Start: 2024-09-18 | End: 2024-09-18 | Stop reason: HOSPADM

## 2024-09-18 RX ORDER — ENOXAPARIN SODIUM 100 MG/ML
1 INJECTION SUBCUTANEOUS 2 TIMES DAILY
Status: DISCONTINUED | OUTPATIENT
Start: 2024-09-18 | End: 2024-09-18 | Stop reason: HOSPADM

## 2024-09-18 RX ORDER — HYDROCODONE BITARTRATE AND ACETAMINOPHEN 5; 325 MG/1; MG/1
1 TABLET ORAL EVERY 6 HOURS PRN
Qty: 28 TABLET | Refills: 0 | Status: SHIPPED | OUTPATIENT
Start: 2024-09-18 | End: 2024-09-25

## 2024-09-18 RX ORDER — WARFARIN SODIUM 5 MG/1
5 TABLET ORAL ONCE
Status: DISCONTINUED | OUTPATIENT
Start: 2024-09-18 | End: 2024-09-18 | Stop reason: HOSPADM

## 2024-09-18 RX ORDER — ENOXAPARIN SODIUM 100 MG/ML
1 INJECTION SUBCUTANEOUS 2 TIMES DAILY
Qty: 6 ML | Refills: 0 | Status: SHIPPED | OUTPATIENT
Start: 2024-09-18 | End: 2024-09-18 | Stop reason: HOSPADM

## 2024-09-18 RX ADMIN — HYDROCODONE BITARTRATE AND ACETAMINOPHEN 1 TABLET: 5; 325 TABLET ORAL at 13:59

## 2024-09-18 RX ADMIN — HYDROCODONE BITARTRATE AND ACETAMINOPHEN 1 TABLET: 5; 325 TABLET ORAL at 09:01

## 2024-09-18 RX ADMIN — PANTOPRAZOLE SODIUM 40 MG: 40 TABLET, DELAYED RELEASE ORAL at 06:03

## 2024-09-18 RX ADMIN — PANCRELIPASE LIPASE, PANCRELIPASE PROTEASE, PANCRELIPASE AMYLASE 5000 UNITS: 5000; 17000; 24000 CAPSULE, DELAYED RELEASE ORAL at 08:47

## 2024-09-18 RX ADMIN — LEVOTHYROXINE SODIUM 100 MCG: 100 TABLET ORAL at 06:03

## 2024-09-18 RX ADMIN — PANCRELIPASE LIPASE, PANCRELIPASE PROTEASE, PANCRELIPASE AMYLASE 20000 UNITS: 20000; 63000; 84000 CAPSULE, DELAYED RELEASE ORAL at 08:48

## 2024-09-18 RX ADMIN — SODIUM CHLORIDE, PRESERVATIVE FREE 10 ML: 5 INJECTION INTRAVENOUS at 08:48

## 2024-09-18 RX ADMIN — HYDROCODONE BITARTRATE AND ACETAMINOPHEN 1 TABLET: 5; 325 TABLET ORAL at 02:26

## 2024-09-18 RX ADMIN — ENOXAPARIN SODIUM 60 MG: 100 INJECTION SUBCUTANEOUS at 08:48

## 2024-09-18 RX ADMIN — POTASSIUM PHOSPHATE, MONOBASIC AND POTASSIUM PHOSPHATE, DIBASIC 15 MMOL: 224; 236 INJECTION, SOLUTION, CONCENTRATE INTRAVENOUS at 08:56

## 2024-09-18 ASSESSMENT — PAIN SCALES - GENERAL
PAINLEVEL_OUTOF10: 7
PAINLEVEL_OUTOF10: 8
PAINLEVEL_OUTOF10: 0
PAINLEVEL_OUTOF10: 7

## 2024-09-18 ASSESSMENT — PAIN DESCRIPTION - LOCATION
LOCATION: HIP
LOCATION: HIP

## 2024-09-18 ASSESSMENT — PAIN DESCRIPTION - DESCRIPTORS
DESCRIPTORS: ACHING
DESCRIPTORS: ACHING

## 2024-09-18 ASSESSMENT — PAIN DESCRIPTION - ORIENTATION
ORIENTATION: RIGHT
ORIENTATION: RIGHT

## 2024-09-18 NOTE — PROGRESS NOTES
CLINICAL PHARMACY NOTE: MEDS TO BEDS    Total # of Prescriptions Filled: 3   The following medications were delivered to the patient:  Current Discharge Medication List        START taking these medications    Details   HYDROcodone-acetaminophen (NORCO) 5-325 MG per tablet Take 1 tablet by mouth every 6 hours as needed for Pain for up to 7 days. Max Daily Amount: 4 tablets  Qty: 28 tablet, Refills: 0    Comments: Reduce doses taken as pain becomes manageable  Associated Diagnoses: Nondisplaced fracture of neck of right femur (HCC)      apixaban starter pack (ELIQUIS DVT/PE STARTER PACK) 5 MG TBPK tablet Take 1 tablet by mouth See Admin Instructions  Qty: 74 tablet, Refills: 0      apixaban (ELIQUIS) 5 MG TABS tablet Take 1 tablet by mouth 2 times daily  Qty: 60 tablet, Refills: 4               Additional Documentation:  Handed to patients family. Paid with cash.

## 2024-09-18 NOTE — CARE COORDINATION
09/18/24 0918   IMM Letter   IMM Letter given to Patient/Family/Significant other/Guardian/POA/by: Marivel Car   IMM Letter date given: 09/18/24   IMM Letter time given: 0830     Patient declined waiting 4 hr period prior to discharge.  Second IMM given to patient and explained with patient verbalizing understanding.  All questions and concerns addressed     Signed letter placed in pt soft chart  Electronically signed by ARPAN GARCIA on 9/18/2024 at 9:19 AM

## 2024-09-18 NOTE — CARE COORDINATION
Eliquis cost for pt is $47.00 per month and is affordable. Pt provided with 30 day free coupon. Electronically signed by Yazmin Zamudio RN on 9/18/2024 at 1:32 PM

## 2024-09-18 NOTE — DISCHARGE SUMMARY
Hospital Medicine Discharge Summary    Patient ID: Jacqueline Richardson      Patient's PCP: Jodi Fine, APRN - CNP    Admit Date: 9/14/2024     Discharge Date:   9/18/2024    Admitting Provider: Troy Sarmiento MD     Discharge Provider: Troy Sarmiento MD     Discharge Diagnoses:       Active Hospital Problems    Diagnosis     Smoker [F17.200]     Syncope [R55]     Closed right hip fracture (HCC) [S72.001A]     Diabetes (HCC) [E11.9]     Hypertension [I10]     Hypothyroidism [E03.9]     Syncope and collapse [R55]     Nondisplaced fracture of neck of right femur (HCC) [S72.001A]        The patient was seen and examined on day of discharge and this discharge summary is in conjunction with any daily progress note from day of discharge.    Hospital Course: 74 y.o. female who presented to [facility] ED for evaluation of right hip injury following syncope at home. Pt has history of diabetes, hypertension, hypothyroidism and pancreatitis s/p pancreatic resection and on insulin at home.         Ddimer >20, but no PE on CTPA. +R peroneal acute DVT.        R femoral Fx.   To OR with Dr Bowen 9/15 for percutaneous screw fixation        Elevated troponin.   Syncope at home.   Head CT negative.   ECHO reviewed - no acute findings.   Etiology of syncope unclear at this time.   - ILR placed with Dr Junior 9/16  - stress test done and negative           Hx of chronic pancreatitis s/p pancreatic resection On insulin at baseline   Added lantus.   Cont ISS.   Advance to CCD  Hypoglycemia episodes - lantus dose decreased.           Rib pain left side posterior and lateral with bruise present in the area.   CTA did not suggest pulm contusion or bone fracture.   Will get dedicated rib XR - no fractures.   Cont pain control and IS.               Prelim Carotids - <50% stenosis.   Prelim DVT study - positive acute DVT R peroneal veins.               - started Tx lovenox.               - pt initially requested warfarin, but then

## 2024-09-25 ENCOUNTER — TELEPHONE (OUTPATIENT)
Dept: CARDIOLOGY CLINIC | Age: 74
End: 2024-09-25

## 2024-09-27 ENCOUNTER — NURSE ONLY (OUTPATIENT)
Dept: CARDIOLOGY CLINIC | Age: 74
End: 2024-09-27

## 2024-09-27 DIAGNOSIS — Z51.89 VISIT FOR WOUND CHECK: Primary | ICD-10-CM

## 2024-10-01 ENCOUNTER — OFFICE VISIT (OUTPATIENT)
Age: 74
End: 2024-10-01

## 2024-10-01 VITALS — BODY MASS INDEX: 19.99 KG/M2 | HEIGHT: 65 IN | WEIGHT: 120 LBS

## 2024-10-01 DIAGNOSIS — M89.8X5 PAIN IN RIGHT FEMUR: Primary | ICD-10-CM

## 2024-10-01 DIAGNOSIS — S72.011D: ICD-10-CM

## 2024-10-01 NOTE — PROGRESS NOTES
hardware failure or loosening.  No other acute findings noted.         Heena Phillips is a 74 y.o. female who returns for follow-up after closed reduction and percutaneous pinning of right subcapital femoral neck fracture.  Staples were removed today.  We reviewed clinical and radiographic findings today.  I think she is safe to begin weightbearing as tolerated.  Continue with home health therapy for gait training and lower extremity strengthening.  She will continue with Eliquis.  Plan follow-up in 1 month for repeat clinical and radiographic evaluation.  Will likely follow her course at least for 1 year to rule out any evidence of avascular necrosis.        This dictation was generated by voice recognition computer software. Although all attempts are made to edit the dictation for accuracy, there may be errors in the transcription that are not intended.    Electronically signed by Lauryn Hawley MA on 10/1/2024 at 1:19 PM

## 2024-10-02 DIAGNOSIS — R55 SYNCOPE AND COLLAPSE: ICD-10-CM

## 2024-10-02 DIAGNOSIS — Z95.818 STATUS POST PLACEMENT OF IMPLANTABLE LOOP RECORDER: Primary | ICD-10-CM

## 2024-10-16 ENCOUNTER — OFFICE VISIT (OUTPATIENT)
Dept: CARDIOLOGY CLINIC | Age: 74
End: 2024-10-16

## 2024-10-16 VITALS
WEIGHT: 122 LBS | HEIGHT: 65 IN | OXYGEN SATURATION: 99 % | HEART RATE: 93 BPM | BODY MASS INDEX: 20.33 KG/M2 | SYSTOLIC BLOOD PRESSURE: 132 MMHG | DIASTOLIC BLOOD PRESSURE: 88 MMHG

## 2024-10-16 DIAGNOSIS — Z79.4 TYPE 2 DIABETES MELLITUS WITH OTHER SPECIFIED COMPLICATION, WITH LONG-TERM CURRENT USE OF INSULIN (HCC): ICD-10-CM

## 2024-10-16 DIAGNOSIS — S72.001D CLOSED FRACTURE OF RIGHT HIP WITH ROUTINE HEALING, SUBSEQUENT ENCOUNTER: ICD-10-CM

## 2024-10-16 DIAGNOSIS — R55 SYNCOPE AND COLLAPSE: Primary | ICD-10-CM

## 2024-10-16 DIAGNOSIS — F17.200 SMOKER: ICD-10-CM

## 2024-10-16 DIAGNOSIS — E11.69 TYPE 2 DIABETES MELLITUS WITH OTHER SPECIFIED COMPLICATION, WITH LONG-TERM CURRENT USE OF INSULIN (HCC): ICD-10-CM

## 2024-10-16 DIAGNOSIS — I1A.0 RESISTANT HYPERTENSION: ICD-10-CM

## 2024-10-16 RX ORDER — HUMAN INSULIN 100 [IU]/ML
INJECTION, SUSPENSION SUBCUTANEOUS
COMMUNITY
Start: 2024-07-25

## 2024-10-16 ASSESSMENT — ENCOUNTER SYMPTOMS
VOMITING: 0
ABDOMINAL PAIN: 0
EYE REDNESS: 0
WHEEZING: 0
SHORTNESS OF BREATH: 1
FACIAL SWELLING: 0
COUGH: 0
CHEST TIGHTNESS: 0
NAUSEA: 0

## 2024-10-16 NOTE — PROGRESS NOTES
Physical Exam  Vitals and nursing note reviewed.   Constitutional:       General: She is not in acute distress.     Appearance: She is well-developed. She is not diaphoretic.      Comments: Elderly, fatigued   HENT:      Head: Normocephalic and atraumatic.      Right Ear: Hearing and external ear normal.      Left Ear: Hearing and external ear normal.      Nose: Nose normal.   Eyes:      General:         Right eye: No discharge.         Left eye: No discharge.      Pupils: Pupils are equal, round, and reactive to light.   Neck:      Thyroid: No thyromegaly.      Vascular: No carotid bruit or JVD.      Trachea: No tracheal deviation.   Cardiovascular:      Rate and Rhythm: Normal rate and regular rhythm.      Heart sounds: Normal heart sounds. No murmur heard.     No friction rub. No gallop.   Pulmonary:      Effort: Pulmonary effort is normal. No respiratory distress.      Breath sounds: Normal breath sounds. No wheezing or rales.   Abdominal:      Palpations: Abdomen is soft.      Tenderness: There is no abdominal tenderness.   Musculoskeletal:         General: No swelling or deformity.      Cervical back: Neck supple. No muscular tenderness.      Right lower leg: No edema.      Left lower leg: No edema.      Comments: Normal gait and station   Skin:     General: Skin is warm and dry.      Findings: No rash.      Comments: Loop recorder site on chest is well-healed without sign of infection   Neurological:      General: No focal deficit present.      Mental Status: She is alert and oriented to person, place, and time.      Cranial Nerves: No cranial nerve deficit.   Psychiatric:         Mood and Affect: Mood normal.         Behavior: Behavior normal.         Judgment: Judgment normal.         Data:  Lab Results   Component Value Date/Time    WBC 10.1 09/18/2024 01:36 AM    RBC 3.47 09/18/2024 01:36 AM    HGB 11.2 09/18/2024 01:36 AM    HCT 33.4 09/18/2024 01:36 AM     09/18/2024 01:36 AM      No results

## 2024-10-16 NOTE — PATIENT INSTRUCTIONS
Return in about 6 months (around 4/16/2025) for APRN.  Quit smoking.  Call the [place] Tobacco Quit Line 1-800-QUIT-NOW   Physical therapy  Use symptom marker if you have a passing out episode.  Keep bedside monitor plugged in

## 2024-10-16 NOTE — PROGRESS NOTES
Physician Progress Note      PATIENT:               MORRIS CHRISTIANSON  [facility] #:                  [id number]  :                       1950  ADMIT DATE:       2024 1:29 PM  DISCH DATE:        2024 2:33 PM  RESPONDING  PROVIDER #:        Troy Sarmiento MD          QUERY TEXT:    Patient admitted with right femur fracture following episode of syncope   Patient noted to have elevated Troponin > 20% change. If possible, please   document in progress notes and discharge summary after study the etiology of   the syncope:    The medical record reflects the following:  Risk Factors:  Syncope, DMII, HTN, Smoker  Clinical Indicators: Syncope. Troponin 20. EKG ST. Rate 115.  Treatment: Labs, Cardiology consult    Thank you  Gloria De Luna RN, BSN, [facility]  [phone]  Options provided:  -- Syncope due to a demand ischemia without infarction.  -- Syncope due to myocardia injury without infarction  -- Other - I will add my own diagnosis  -- Disagree - Not applicable / Not valid  -- Disagree - Clinically unable to determine / Unknown  -- Refer to Clinical Documentation Reviewer    PROVIDER RESPONSE TEXT:    The syncope is due to a demand ischemia without infarction.    Query created by: Gloria De Luna on 2024 10:34 AM      Electronically signed by:  Troy Sarmiento MD 10/16/2024 5:45 PM

## 2024-11-04 DIAGNOSIS — R55 SYNCOPE AND COLLAPSE: ICD-10-CM

## 2024-11-04 DIAGNOSIS — Z95.818 STATUS POST PLACEMENT OF IMPLANTABLE LOOP RECORDER: Primary | ICD-10-CM

## 2024-11-05 RX ORDER — METOPROLOL SUCCINATE 25 MG/1
25 TABLET, EXTENDED RELEASE ORAL DAILY
Qty: 90 TABLET | Refills: 3 | Status: SHIPPED | OUTPATIENT
Start: 2024-11-05

## 2024-11-11 ENCOUNTER — OFFICE VISIT (OUTPATIENT)
Age: 74
End: 2024-11-11

## 2024-11-11 DIAGNOSIS — M89.8X5 PAIN IN RIGHT FEMUR: ICD-10-CM

## 2024-11-11 DIAGNOSIS — S72.011D: Primary | ICD-10-CM

## 2024-11-11 PROBLEM — Z94.89 HISTORY OF PANCREATIC ISLET CELL TRANSPLANTATION: Status: ACTIVE | Noted: 2018-07-12

## 2024-11-11 PROBLEM — R19.7 DIARRHEA: Status: ACTIVE | Noted: 2023-08-25

## 2024-11-11 PROBLEM — R10.84 GENERALIZED ABDOMINAL PAIN: Status: ACTIVE | Noted: 2023-08-10

## 2024-11-11 PROBLEM — R30.0 DYSURIA: Status: ACTIVE | Noted: 2024-03-14

## 2024-11-11 PROBLEM — E78.2 MIXED HYPERLIPIDEMIA: Status: ACTIVE | Noted: 2023-11-28

## 2024-11-11 PROBLEM — A09 INFECTIOUS COLITIS: Status: ACTIVE | Noted: 2023-08-21

## 2024-11-11 PROBLEM — R42 DIZZINESS: Status: ACTIVE | Noted: 2024-10-30

## 2024-11-11 PROBLEM — M54.50 CHRONIC LOW BACK PAIN: Status: ACTIVE | Noted: 2024-10-14

## 2024-11-11 PROBLEM — E87.6 HYPOKALEMIA: Status: ACTIVE | Noted: 2023-12-01

## 2024-11-11 PROBLEM — M85.80 OSTEOPENIA: Status: ACTIVE | Noted: 2019-01-17

## 2024-11-11 PROBLEM — K29.70 GASTRITIS: Status: ACTIVE | Noted: 2023-11-30

## 2024-11-11 PROBLEM — K57.30 DIVERTICULAR DISEASE OF COLON: Status: ACTIVE | Noted: 2023-11-30

## 2024-11-11 PROBLEM — K86.89 PANCREATIC INSUFFICIENCY: Status: ACTIVE | Noted: 2017-10-30

## 2024-11-11 PROBLEM — N39.0 ACUTE URINARY TRACT INFECTION: Status: ACTIVE | Noted: 2024-03-14

## 2024-11-11 PROBLEM — K21.9 GASTROESOPHAGEAL REFLUX DISEASE WITHOUT ESOPHAGITIS: Status: ACTIVE | Noted: 2023-08-09

## 2024-11-11 PROBLEM — B37.2 CANDIDIASIS OF SKIN: Status: ACTIVE | Noted: 2024-10-17

## 2024-11-11 PROBLEM — F17.200 NICOTINE DEPENDENCE: Status: ACTIVE | Noted: 2023-08-21

## 2024-11-11 PROBLEM — F33.1 MODERATE RECURRENT MAJOR DEPRESSION (HCC): Status: ACTIVE | Noted: 2024-10-14

## 2024-11-11 PROBLEM — K58.0 IRRITABLE BOWEL SYNDROME WITH DIARRHEA: Status: ACTIVE | Noted: 2023-11-30

## 2024-11-11 PROBLEM — H66.90 OTITIS MEDIA: Status: ACTIVE | Noted: 2024-10-14

## 2024-11-11 PROBLEM — M25.551 PAIN OF RIGHT HIP JOINT: Status: ACTIVE | Noted: 2024-09-24

## 2024-11-11 PROBLEM — R74.8 ELEVATED LIVER ENZYMES: Status: ACTIVE | Noted: 2024-08-02

## 2024-11-11 PROBLEM — S72.009A CLOSED FRACTURE OF HIP (HCC): Status: ACTIVE | Noted: 2024-09-14

## 2024-11-11 PROBLEM — D12.6 BENIGN NEOPLASM OF COLON: Status: ACTIVE | Noted: 2023-11-30

## 2024-11-11 PROBLEM — K63.5 COLON POLYP: Status: ACTIVE | Noted: 2017-10-30

## 2024-11-11 PROBLEM — R50.9 FEVER WITH CHILLS: Status: ACTIVE | Noted: 2024-10-14

## 2024-11-11 PROBLEM — D12.6 ADENOMATOUS POLYP OF COLON: Status: ACTIVE | Noted: 2018-09-12

## 2024-11-11 PROBLEM — K57.92 DIVERTICULITIS: Status: ACTIVE | Noted: 2023-08-09

## 2024-11-11 PROBLEM — G89.29 CHRONIC LOW BACK PAIN: Status: ACTIVE | Noted: 2024-10-14

## 2024-11-11 PROBLEM — S42.241A: Status: ACTIVE | Noted: 2022-12-15

## 2024-11-11 PROBLEM — J01.90 ACUTE SINUSITIS: Status: ACTIVE | Noted: 2024-11-04

## 2024-11-11 PROBLEM — U07.1 COVID-19: Status: ACTIVE | Noted: 2023-09-18

## 2024-11-11 PROBLEM — F41.1 GENERALIZED ANXIETY DISORDER: Status: ACTIVE | Noted: 2024-07-25

## 2024-11-11 PROBLEM — M15.9 OSTEOARTHRITIS OF MULTIPLE JOINTS: Status: ACTIVE | Noted: 2023-12-01

## 2024-11-11 RX ORDER — SUCRALFATE 1 G/1
TABLET ORAL
COMMUNITY

## 2024-11-11 RX ORDER — METRONIDAZOLE 500 MG/1
TABLET ORAL
COMMUNITY

## 2024-11-11 RX ORDER — HYDROCODONE BITARTRATE AND ACETAMINOPHEN 7.5; 325 MG/1; MG/1
TABLET ORAL
COMMUNITY
Start: 2024-10-14

## 2024-11-11 RX ORDER — MECLIZINE HYDROCHLORIDE 25 MG/1
TABLET ORAL
COMMUNITY
Start: 2024-10-30

## 2024-11-11 RX ORDER — MIRTAZAPINE 15 MG/1
TABLET, FILM COATED ORAL
COMMUNITY
Start: 2024-10-14

## 2024-11-11 RX ORDER — CYCLOBENZAPRINE HCL 5 MG
TABLET ORAL
COMMUNITY

## 2024-11-11 RX ORDER — LOSARTAN POTASSIUM 25 MG/1
1 TABLET ORAL DAILY
COMMUNITY

## 2024-11-11 RX ORDER — CLONAZEPAM 0.5 MG/1
TABLET ORAL
COMMUNITY

## 2024-11-11 RX ORDER — AMOXICILLIN 500 MG/1
CAPSULE ORAL
COMMUNITY
Start: 2024-10-15

## 2024-11-11 RX ORDER — CEFDINIR 300 MG/1
CAPSULE ORAL
COMMUNITY

## 2024-11-11 RX ORDER — SULFAMETHOXAZOLE AND TRIMETHOPRIM 800; 160 MG/1; MG/1
TABLET ORAL
COMMUNITY
Start: 2024-09-24

## 2024-11-11 RX ORDER — DICYCLOMINE HYDROCHLORIDE 10 MG/1
CAPSULE ORAL
COMMUNITY

## 2024-11-11 NOTE — PROGRESS NOTES
[facility]  [facility]  [address]   [facility] [id number] [phone]     Patient: Jacqueline Richardson   YOB: 1950   Date: 11/11/2024     Chief Complaint   Patient presents with    right hip        History of Present Illness  Jacqueline is a 74 y.o. female who returns approximately 6 weeks after percutaneous cannulated screw fixation of a minimally displaced subcapital right femoral neck fracture on 9/15/2024, doing reasonably well.  She is receiving home health therapy.  States that she is able to ambulate at home independently as long as she has something to rest on intermittently.  Otherwise, she is using a walker.  Reports that pain is well-controlled at this point.      Past Medical History:   Diagnosis Date    Acquired diverticulum of bladder     Diabetes (HCC)     Hypertension     Smoker     Thyroid disease       Past Surgical History:   Procedure Laterality Date    APPENDECTOMY      CHOLECYSTECTOMY      EP DEVICE PROCEDURE N/A 09/16/2024    Loop recorder insert performed by Agus Junior MD at [facility] CARDIAC CATH LAB    HIP SURGERY Right 09/15/2024    HIP PINNING performed by Casey Bowen MD at [facility] OR    HYSTERECTOMY (CERVIX STATUS UNKNOWN)      PANCREATECTOMY      SPLENECTOMY      TONSILLECTOMY        Social History     Socioeconomic History    Marital status:    Tobacco Use    Smoking status: Every Day     Types: Cigarettes    Smokeless tobacco: Never   Substance and Sexual Activity    Alcohol use: Not Currently    Drug use: Never     Social Determinants of Health     Food Insecurity: No Food Insecurity (9/15/2024)    Hunger Vital Sign     Worried About Running Out of Food in the Last Year: Never true     Ran Out of Food in the Last Year: Never true   Transportation Needs: No Transportation Needs (9/15/2024)    PRAPARE - Transportation     Lack of Transportation (Medical): No     Lack of Transportation (Non-Medical):

## 2024-11-12 ENCOUNTER — TELEPHONE (OUTPATIENT)
Age: 74
End: 2024-11-12

## 2024-11-19 DIAGNOSIS — R55 SYNCOPE AND COLLAPSE: ICD-10-CM

## 2024-11-19 DIAGNOSIS — Z95.818 STATUS POST PLACEMENT OF IMPLANTABLE LOOP RECORDER: Primary | ICD-10-CM

## 2024-12-31 ENCOUNTER — HOSPITAL ENCOUNTER (INPATIENT)
Age: 74
LOS: 2 days | Discharge: NURSING, REHAB OR LONG-TERM CARE | DRG: 872 | End: 2025-01-02
Attending: PEDIATRICS | Admitting: STUDENT IN AN ORGANIZED HEALTH CARE EDUCATION/TRAINING PROGRAM
Payer: MEDICARE

## 2024-12-31 ENCOUNTER — APPOINTMENT (OUTPATIENT)
Dept: GENERAL RADIOLOGY | Age: 74
DRG: 872 | End: 2024-12-31
Payer: MEDICARE

## 2024-12-31 DIAGNOSIS — A41.9 SEPSIS, DUE TO UNSPECIFIED ORGANISM, UNSPECIFIED WHETHER ACUTE ORGAN DYSFUNCTION PRESENT (HCC): Primary | ICD-10-CM

## 2024-12-31 DIAGNOSIS — R53.1 GENERAL WEAKNESS: ICD-10-CM

## 2024-12-31 LAB
ALBUMIN SERPL-MCNC: 2.3 G/DL (ref 3.5–5.2)
ALP SERPL-CCNC: 384 U/L (ref 35–104)
ALT SERPL-CCNC: 23 U/L (ref 5–33)
ANION GAP SERPL CALCULATED.3IONS-SCNC: 16 MMOL/L (ref 7–19)
AST SERPL-CCNC: 44 U/L (ref 5–32)
B PARAP IS1001 DNA NPH QL NAA+NON-PROBE: NOT DETECTED
B PERT.PT PRMT NPH QL NAA+NON-PROBE: NOT DETECTED
BACTERIA URNS QL MICRO: NORMAL /HPF
BASOPHILS # BLD: 0.1 K/UL (ref 0–0.2)
BASOPHILS NFR BLD: 0.7 % (ref 0–1)
BILIRUB SERPL-MCNC: 0.6 MG/DL (ref 0.2–1.2)
BILIRUB UR QL STRIP: NEGATIVE
BNP BLD-MCNC: 1422 PG/ML (ref 0–124)
BUN SERPL-MCNC: 16 MG/DL (ref 8–23)
C PNEUM DNA NPH QL NAA+NON-PROBE: NOT DETECTED
CALCIUM SERPL-MCNC: 7.3 MG/DL (ref 8.8–10.2)
CHLORIDE SERPL-SCNC: 102 MMOL/L (ref 98–111)
CLARITY UR: CLEAR
CO2 SERPL-SCNC: 21 MMOL/L (ref 22–29)
COLOR UR: YELLOW
CREAT SERPL-MCNC: 0.8 MG/DL (ref 0.5–0.9)
CRYSTALS URNS MICRO: NORMAL /HPF
EKG P AXIS: 54 DEGREES
EKG P-R INTERVAL: 150 MS
EKG Q-T INTERVAL: 304 MS
EKG QRS DURATION: 94 MS
EKG QTC CALCULATION (BAZETT): 396 MS
EKG T AXIS: 114 DEGREES
EOSINOPHIL # BLD: 0.1 K/UL (ref 0–0.6)
EOSINOPHIL NFR BLD: 0.4 % (ref 0–5)
EPI CELLS #/AREA URNS AUTO: 3 /HPF (ref 0–5)
ERYTHROCYTE [DISTWIDTH] IN BLOOD BY AUTOMATED COUNT: 15.2 % (ref 11.5–14.5)
FLUAV RNA NPH QL NAA+NON-PROBE: NOT DETECTED
FLUBV RNA NPH QL NAA+NON-PROBE: NOT DETECTED
GLUCOSE BLD-MCNC: 173 MG/DL (ref 70–99)
GLUCOSE SERPL-MCNC: 172 MG/DL (ref 70–99)
GLUCOSE UR STRIP.AUTO-MCNC: NEGATIVE MG/DL
HADV DNA NPH QL NAA+NON-PROBE: NOT DETECTED
HCOV 229E RNA NPH QL NAA+NON-PROBE: NOT DETECTED
HCOV HKU1 RNA NPH QL NAA+NON-PROBE: NOT DETECTED
HCOV NL63 RNA NPH QL NAA+NON-PROBE: NOT DETECTED
HCOV OC43 RNA NPH QL NAA+NON-PROBE: NOT DETECTED
HCT VFR BLD AUTO: 38 % (ref 37–47)
HGB BLD-MCNC: 12.6 G/DL (ref 12–16)
HGB UR STRIP.AUTO-MCNC: NEGATIVE MG/L
HMPV RNA NPH QL NAA+NON-PROBE: NOT DETECTED
HPIV1 RNA NPH QL NAA+NON-PROBE: NOT DETECTED
HPIV2 RNA NPH QL NAA+NON-PROBE: NOT DETECTED
HPIV3 RNA NPH QL NAA+NON-PROBE: NOT DETECTED
HPIV4 RNA NPH QL NAA+NON-PROBE: NOT DETECTED
HYALINE CASTS #/AREA URNS AUTO: 4 /HPF (ref 0–8)
IMM GRANULOCYTES # BLD: 0 K/UL
KETONES UR STRIP.AUTO-MCNC: ABNORMAL MG/DL
LACTATE BLDV-SCNC: 3.2 MG/DL (ref 0.5–1.9)
LACTATE BLDV-SCNC: 3.5 MMOL/L (ref 0.5–1.9)
LACTATE BLDV-SCNC: 3.7 MG/DL (ref 0.5–1.9)
LEUKOCYTE ESTERASE UR QL STRIP.AUTO: ABNORMAL
LYMPHOCYTES # BLD: 0.9 K/UL (ref 1.1–4.5)
LYMPHOCYTES NFR BLD: 7.1 % (ref 20–40)
M PNEUMO DNA NPH QL NAA+NON-PROBE: NOT DETECTED
MAGNESIUM SERPL-MCNC: 1.5 MG/DL (ref 1.6–2.4)
MCH RBC QN AUTO: 34.8 PG (ref 27–31)
MCHC RBC AUTO-ENTMCNC: 33.2 G/DL (ref 33–37)
MCV RBC AUTO: 105 FL (ref 81–99)
MONOCYTES # BLD: 0.9 K/UL (ref 0–0.9)
MONOCYTES NFR BLD: 6.8 % (ref 0–10)
NEUTROPHILS # BLD: 11.3 K/UL (ref 1.5–7.5)
NEUTS SEG NFR BLD: 84.7 % (ref 50–65)
NITRITE UR QL STRIP.AUTO: NEGATIVE
PERFORMED ON: ABNORMAL
PH UR STRIP.AUTO: 5.5 [PH] (ref 5–8)
PLATELET # BLD AUTO: 407 K/UL (ref 130–400)
PMV BLD AUTO: 10.2 FL (ref 9.4–12.3)
POTASSIUM SERPL-SCNC: 3.8 MMOL/L (ref 3.5–5)
PROCALCITONIN: 0.21 NG/ML (ref 0–0.09)
PROT SERPL-MCNC: 4.5 G/DL (ref 6.4–8.3)
PROT UR STRIP.AUTO-MCNC: ABNORMAL MG/DL
RBC # BLD AUTO: 3.62 M/UL (ref 4.2–5.4)
RBC #/AREA URNS AUTO: 2 /HPF (ref 0–4)
RSV RNA NPH QL NAA+NON-PROBE: NOT DETECTED
RV+EV RNA NPH QL NAA+NON-PROBE: NOT DETECTED
SARS-COV-2 RNA NPH QL NAA+NON-PROBE: NOT DETECTED
SODIUM SERPL-SCNC: 139 MMOL/L (ref 136–145)
SP GR UR STRIP.AUTO: 1.02 (ref 1–1.03)
TROPONIN, HIGH SENSITIVITY: 33 NG/L (ref 0–14)
TROPONIN, HIGH SENSITIVITY: 36 NG/L (ref 0–14)
UROBILINOGEN UR STRIP.AUTO-MCNC: 0.2 E.U./DL
WBC # BLD AUTO: 13.3 K/UL (ref 4.8–10.8)
WBC #/AREA URNS AUTO: 1 /HPF (ref 0–5)

## 2024-12-31 PROCEDURE — 84145 PROCALCITONIN (PCT): CPT

## 2024-12-31 PROCEDURE — 2500000003 HC RX 250 WO HCPCS: Performed by: NURSE PRACTITIONER

## 2024-12-31 PROCEDURE — 51701 INSERT BLADDER CATHETER: CPT

## 2024-12-31 PROCEDURE — 83880 ASSAY OF NATRIURETIC PEPTIDE: CPT

## 2024-12-31 PROCEDURE — 99285 EMERGENCY DEPT VISIT HI MDM: CPT

## 2024-12-31 PROCEDURE — 96365 THER/PROPH/DIAG IV INF INIT: CPT

## 2024-12-31 PROCEDURE — 93010 ELECTROCARDIOGRAM REPORT: CPT | Performed by: INTERNAL MEDICINE

## 2024-12-31 PROCEDURE — 83605 ASSAY OF LACTIC ACID: CPT

## 2024-12-31 PROCEDURE — 1200000000 HC SEMI PRIVATE

## 2024-12-31 PROCEDURE — 2580000003 HC RX 258: Performed by: NURSE PRACTITIONER

## 2024-12-31 PROCEDURE — 87641 MR-STAPH DNA AMP PROBE: CPT

## 2024-12-31 PROCEDURE — 85025 COMPLETE CBC W/AUTO DIFF WBC: CPT

## 2024-12-31 PROCEDURE — 93005 ELECTROCARDIOGRAM TRACING: CPT | Performed by: PEDIATRICS

## 2024-12-31 PROCEDURE — 87040 BLOOD CULTURE FOR BACTERIA: CPT

## 2024-12-31 PROCEDURE — 81001 URINALYSIS AUTO W/SCOPE: CPT

## 2024-12-31 PROCEDURE — 96360 HYDRATION IV INFUSION INIT: CPT

## 2024-12-31 PROCEDURE — 96375 TX/PRO/DX INJ NEW DRUG ADDON: CPT

## 2024-12-31 PROCEDURE — 36415 COLL VENOUS BLD VENIPUNCTURE: CPT

## 2024-12-31 PROCEDURE — 84484 ASSAY OF TROPONIN QUANT: CPT

## 2024-12-31 PROCEDURE — 80053 COMPREHEN METABOLIC PANEL: CPT

## 2024-12-31 PROCEDURE — 0202U NFCT DS 22 TRGT SARS-COV-2: CPT

## 2024-12-31 PROCEDURE — 6370000000 HC RX 637 (ALT 250 FOR IP): Performed by: NURSE PRACTITIONER

## 2024-12-31 PROCEDURE — 96361 HYDRATE IV INFUSION ADD-ON: CPT

## 2024-12-31 PROCEDURE — 82962 GLUCOSE BLOOD TEST: CPT

## 2024-12-31 PROCEDURE — 83735 ASSAY OF MAGNESIUM: CPT

## 2024-12-31 PROCEDURE — 96367 TX/PROPH/DG ADDL SEQ IV INF: CPT

## 2024-12-31 PROCEDURE — 71045 X-RAY EXAM CHEST 1 VIEW: CPT

## 2024-12-31 PROCEDURE — 2580000003 HC RX 258: Performed by: PEDIATRICS

## 2024-12-31 PROCEDURE — 93005 ELECTROCARDIOGRAM TRACING: CPT | Performed by: EMERGENCY MEDICINE

## 2024-12-31 PROCEDURE — 6360000002 HC RX W HCPCS: Performed by: PEDIATRICS

## 2024-12-31 RX ORDER — 0.9 % SODIUM CHLORIDE 0.9 %
500 INTRAVENOUS SOLUTION INTRAVENOUS ONCE
Status: COMPLETED | OUTPATIENT
Start: 2024-12-31 | End: 2024-12-31

## 2024-12-31 RX ORDER — SODIUM CHLORIDE 0.9 % (FLUSH) 0.9 %
5-40 SYRINGE (ML) INJECTION EVERY 12 HOURS SCHEDULED
Status: DISCONTINUED | OUTPATIENT
Start: 2024-12-31 | End: 2025-01-02 | Stop reason: HOSPADM

## 2024-12-31 RX ORDER — LEVOTHYROXINE SODIUM 100 UG/1
100 TABLET ORAL DAILY
Status: DISCONTINUED | OUTPATIENT
Start: 2025-01-01 | End: 2025-01-02 | Stop reason: HOSPADM

## 2024-12-31 RX ORDER — CYCLOBENZAPRINE HCL 10 MG
5 TABLET ORAL 2 TIMES DAILY
Status: DISCONTINUED | OUTPATIENT
Start: 2024-12-31 | End: 2025-01-01

## 2024-12-31 RX ORDER — LOSARTAN POTASSIUM 25 MG/1
25 TABLET ORAL DAILY
Status: DISCONTINUED | OUTPATIENT
Start: 2024-12-31 | End: 2025-01-01

## 2024-12-31 RX ORDER — INSULIN LISPRO 100 [IU]/ML
0-4 INJECTION, SOLUTION INTRAVENOUS; SUBCUTANEOUS
Status: DISCONTINUED | OUTPATIENT
Start: 2024-12-31 | End: 2025-01-02 | Stop reason: HOSPADM

## 2024-12-31 RX ORDER — CALCIUM GLUCONATE 94 MG/ML
1000 INJECTION, SOLUTION INTRAVENOUS ONCE
Status: COMPLETED | OUTPATIENT
Start: 2024-12-31 | End: 2024-12-31

## 2024-12-31 RX ORDER — ONDANSETRON 2 MG/ML
4 INJECTION INTRAMUSCULAR; INTRAVENOUS EVERY 6 HOURS PRN
Status: DISCONTINUED | OUTPATIENT
Start: 2024-12-31 | End: 2025-01-02 | Stop reason: HOSPADM

## 2024-12-31 RX ORDER — VANCOMYCIN 1.75 G/350ML
25 INJECTION, SOLUTION INTRAVENOUS ONCE
Status: COMPLETED | OUTPATIENT
Start: 2024-12-31 | End: 2024-12-31

## 2024-12-31 RX ORDER — BUTALBITAL, ACETAMINOPHEN AND CAFFEINE 50; 325; 40 MG/1; MG/1; MG/1
1 TABLET ORAL DAILY PRN
Status: DISCONTINUED | OUTPATIENT
Start: 2024-12-31 | End: 2025-01-01

## 2024-12-31 RX ORDER — LEVOFLOXACIN 5 MG/ML
750 INJECTION, SOLUTION INTRAVENOUS ONCE
Status: DISCONTINUED | OUTPATIENT
Start: 2024-12-31 | End: 2024-12-31

## 2024-12-31 RX ORDER — SPIRONOLACTONE 25 MG/1
25 TABLET ORAL DAILY
Status: DISCONTINUED | OUTPATIENT
Start: 2024-12-31 | End: 2025-01-02 | Stop reason: HOSPADM

## 2024-12-31 RX ORDER — COLCHICINE 0.6 MG/1
0.6 TABLET ORAL DAILY PRN
Status: DISCONTINUED | OUTPATIENT
Start: 2024-12-31 | End: 2025-01-01

## 2024-12-31 RX ORDER — SODIUM CHLORIDE 9 MG/ML
INJECTION, SOLUTION INTRAVENOUS PRN
Status: DISCONTINUED | OUTPATIENT
Start: 2024-12-31 | End: 2025-01-02 | Stop reason: HOSPADM

## 2024-12-31 RX ORDER — DEXTROSE MONOHYDRATE 100 MG/ML
INJECTION, SOLUTION INTRAVENOUS CONTINUOUS PRN
Status: DISCONTINUED | OUTPATIENT
Start: 2024-12-31 | End: 2025-01-02 | Stop reason: HOSPADM

## 2024-12-31 RX ORDER — CLONAZEPAM 0.5 MG/1
0.5 TABLET ORAL DAILY
Status: DISCONTINUED | OUTPATIENT
Start: 2024-12-31 | End: 2025-01-01

## 2024-12-31 RX ORDER — ACETAMINOPHEN 325 MG/1
650 TABLET ORAL EVERY 6 HOURS PRN
Status: DISCONTINUED | OUTPATIENT
Start: 2024-12-31 | End: 2025-01-02 | Stop reason: HOSPADM

## 2024-12-31 RX ORDER — METOPROLOL SUCCINATE 25 MG/1
25 TABLET, EXTENDED RELEASE ORAL DAILY
Status: DISCONTINUED | OUTPATIENT
Start: 2024-12-31 | End: 2025-01-02

## 2024-12-31 RX ORDER — SODIUM CHLORIDE 9 MG/ML
INJECTION, SOLUTION INTRAVENOUS CONTINUOUS
Status: DISCONTINUED | OUTPATIENT
Start: 2024-12-31 | End: 2025-01-01

## 2024-12-31 RX ORDER — MIRTAZAPINE 15 MG/1
15 TABLET, FILM COATED ORAL NIGHTLY
Status: DISCONTINUED | OUTPATIENT
Start: 2024-12-31 | End: 2025-01-01

## 2024-12-31 RX ORDER — PANTOPRAZOLE SODIUM 40 MG/1
40 TABLET, DELAYED RELEASE ORAL DAILY
Status: DISCONTINUED | OUTPATIENT
Start: 2024-12-31 | End: 2025-01-02 | Stop reason: HOSPADM

## 2024-12-31 RX ORDER — DICYCLOMINE HYDROCHLORIDE 10 MG/1
10 CAPSULE ORAL
Status: DISCONTINUED | OUTPATIENT
Start: 2024-12-31 | End: 2025-01-01

## 2024-12-31 RX ORDER — ERGOCALCIFEROL 1.25 MG/1
50000 CAPSULE, LIQUID FILLED ORAL
Status: DISCONTINUED | OUTPATIENT
Start: 2025-01-02 | End: 2025-01-02 | Stop reason: HOSPADM

## 2024-12-31 RX ORDER — ALLOPURINOL 100 MG/1
300 TABLET ORAL DAILY
Status: DISCONTINUED | OUTPATIENT
Start: 2024-12-31 | End: 2025-01-01

## 2024-12-31 RX ORDER — SODIUM CHLORIDE 0.9 % (FLUSH) 0.9 %
5-40 SYRINGE (ML) INJECTION PRN
Status: DISCONTINUED | OUTPATIENT
Start: 2024-12-31 | End: 2025-01-02 | Stop reason: HOSPADM

## 2024-12-31 RX ADMIN — SODIUM CHLORIDE 500 ML: 9 INJECTION, SOLUTION INTRAVENOUS at 15:27

## 2024-12-31 RX ADMIN — ALLOPURINOL 300 MG: 100 TABLET ORAL at 19:36

## 2024-12-31 RX ADMIN — PANTOPRAZOLE SODIUM 40 MG: 40 TABLET, DELAYED RELEASE ORAL at 19:36

## 2024-12-31 RX ADMIN — MIRTAZAPINE 15 MG: 15 TABLET, FILM COATED ORAL at 19:38

## 2024-12-31 RX ADMIN — DICYCLOMINE HYDROCHLORIDE 10 MG: 10 CAPSULE ORAL at 19:38

## 2024-12-31 RX ADMIN — SODIUM CHLORIDE 500 ML: 9 INJECTION, SOLUTION INTRAVENOUS at 13:31

## 2024-12-31 RX ADMIN — LOSARTAN POTASSIUM 25 MG: 25 TABLET, FILM COATED ORAL at 19:36

## 2024-12-31 RX ADMIN — CLONAZEPAM 0.5 MG: 0.5 TABLET ORAL at 19:36

## 2024-12-31 RX ADMIN — CYCLOBENZAPRINE 5 MG: 10 TABLET, FILM COATED ORAL at 19:36

## 2024-12-31 RX ADMIN — VANCOMYCIN 1250 MG: 1.75 INJECTION, SOLUTION INTRAVENOUS at 15:26

## 2024-12-31 RX ADMIN — SODIUM CHLORIDE, PRESERVATIVE FREE 10 ML: 5 INJECTION INTRAVENOUS at 19:35

## 2024-12-31 RX ADMIN — METOPROLOL SUCCINATE 25 MG: 25 TABLET, EXTENDED RELEASE ORAL at 19:36

## 2024-12-31 RX ADMIN — SODIUM CHLORIDE: 9 INJECTION, SOLUTION INTRAVENOUS at 19:11

## 2024-12-31 RX ADMIN — APIXABAN 5 MG: 5 TABLET, FILM COATED ORAL at 19:36

## 2024-12-31 RX ADMIN — CALCIUM GLUCONATE 1000 MG: 98 INJECTION, SOLUTION INTRAVENOUS at 13:35

## 2024-12-31 RX ADMIN — CEFEPIME 2000 MG: 2 INJECTION, POWDER, FOR SOLUTION INTRAVENOUS at 14:38

## 2024-12-31 ASSESSMENT — ENCOUNTER SYMPTOMS
ABDOMINAL PAIN: 0
SHORTNESS OF BREATH: 1
VOMITING: 0
BACK PAIN: 0
COLOR CHANGE: 0
NAUSEA: 1
DIARRHEA: 1
RHINORRHEA: 0
COUGH: 0

## 2024-12-31 NOTE — H&P
______________________________________ Electronically signed by: MYRIAM KEE M.D. Date:     12/31/2024 Time:    11:59         Assessment and Plan:    Principal Problem:    Sepsis (HCC)  Active Problems:    Diabetes (HCC)    Hypertension    Hypothyroidism    Smoker    Closed subcapital fracture of femur, right, with routine healing, subsequent encounter    Acute urinary tract infection    Diarrhea    History of pancreatectomy  Resolved Problems:    * No resolved hospital problems. *     Principal Problem:    Sepsis (HCC)   Ivf 100/hr for 24   Maxipime and vancomycin   Obtain records from [facility]   Blood and urine cultures  Active Problems:    Diabetes (HCC)   Low dose insulin protocol   Accucheck ac and hs   Hypoglycemic protocol    Hypertension    Bp nl at present    Hypothyroidism   Cont synthroid    Smoker   Nicotine patch if needed    Closed subcapital fracture of femur, right, with routine healing, subsequent encounter   Repaired on 12/15 at [facility]    Diarrhea   GI panel    History of pancreatectomy   Cont pancreatic enzymes  Resolved Problems:    * No resolved hospital problems. *      ANKUR Kumar - CNP  5:05 PM 12/31/2024      DISCLAIMER: This note was created with electronic voice recognition which does have occasional errors.  If you have any questions regarding the content within the note please do not hesitate to contact me... Thanks.

## 2024-12-31 NOTE — ED PROVIDER NOTES
[facility] 4 ONCOLOGY UNIT  eMERGENCY dEPARTMENT eNCOUnter      Pt Name: Jacqueline Richardson  MRN: [id number]  Birthdate 1950  Date of evaluation: 12/31/2024  Provider: Cheyenne Lozano MD    CHIEF COMPLAINT       Chief Complaint   Patient presents with    Leg Swelling    Fatigue         HISTORY OF PRESENT ILLNESS   (Location/Symptom, Timing/Onset,Context/Setting, Quality, Duration, Modifying Factors, Severity)  Note limiting factors.   Jacqueline Richardson is a 74 y.o. female who presents to the emergency department with generalized weakness.  Patient states that \"I could not stand up off the couch today.\"  Patient has been experiencing generalized weakness for the past 2 days.  Patient was recently hospitalized for sepsis with urinary tract infection 2 weeks ago at [facility].  Patient states that she is having intermittent dysuria, nausea, and diarrhea.  Patient's antibiotics ended 3 days ago.  Patient has had a decreased appetite and \"I have slept continuously.\"  Patient has also had peripheral edema and shortness of breath.  Patient is a retired nurse and states \"I think they fluid overloaded me.\"  Patient denies fever, vomiting, black or bloody stools, or confusion.  Patient has had fatigue and lower and upper extremity edema.  Patient's last echocardiogram dated 9/15/2024 showed an ejection fraction of 60 to 65%.  Patient also had a Magali scan performed on 9/17/2024 that was normal.  Patient has a history of diabetes, hypertension, tobacco use, thyroid disorder, UTI, gastritis, chronic pancreatitis, diverticulitis, hypokalemia, hyperlipidemia, and depression.    HPI    NursingNotes were reviewed.    REVIEW OF SYSTEMS    (2-9 systems for level 4, 10 or more for level 5)     Review of Systems   Constitutional:  Positive for appetite change and fatigue. Negative for chills and fever.   HENT:  Negative for congestion and rhinorrhea.    Respiratory:  Positive for shortness of breath. Negative for cough.    Cardiovascular:   dictation.    EMERGENCY DEPARTMENT COURSE and DIFFERENTIAL DIAGNOSIS/MDM:   Vitals:    Vitals:    12/31/24 1730 12/31/24 1809 12/31/24 1931 12/31/24 2045   BP: 118/70 126/79  117/74   Pulse: (!) 117 (!) 115 (!) 122 98   Resp: 19 18  18   Temp:  98.1 °F (36.7 °C)  98.1 °F (36.7 °C)   TempSrc:  Temporal  Temporal   SpO2: 97% 96%  94%   Weight:           MDM     Amount and/or Complexity of Data Reviewed  Clinical lab tests: reviewed  Tests in the radiology section of CPT®: reviewed  Tests in the medicine section of CPT®: reviewed  Decide to obtain previous medical records or to obtain history from someone other than the patient: yes    74-year-old female presents to the emergency department with fatigue, generalized weakness, and dysuria.  Patient was recently hospitalized for UTI induced sepsis 2 weeks ago.  On physical examination, patient meets SIRS criteria here with a pulse of 128 and respiratory rate of 24.  Patient is started on cefepime and vancomycin.  Patient is also started on a limited amount of fluids as her BNP is greater than 1400 and she feels short of breath.  We will gently give patient fluid bolus to maintain blood pressure.  Patient will be admitted for further evaluation and treatment.  Discussed with BENJAMIN Barraza, hospitalist, who accepts patient for admission for further evaluation and treatment.    CONSULTS:  IP CONSULT TO PHARMACY  PHARMACY TO DOSE VANCOMYCIN    PROCEDURES:  Unless otherwise noted below, none     Procedures    FINAL IMPRESSION      1. Sepsis, due to unspecified organism, unspecified whether acute organ dysfunction present (HCC)    2. General weakness          DISPOSITION/PLAN   DISPOSITION Admitted 12/31/2024 04:53:53 PM           (Please note that portions of this note were completed with a voice recognition program.  Efforts were made to edit thedictations but occasionally words are mis-transcribed.)    Cheyenne Lozano MD (electronically signed)  Attending

## 2024-12-31 NOTE — PROGRESS NOTES
[facility]   Pharmacy Pharmacokinetic Monitoring Service - Vancomycin     Jacqueline Richardson is a 74 y.o. female starting on vancomycin therapy for sepsis of unknown etiology. Pharmacy consulted by Dr. Lozano for monitoring and adjustment.    Target Concentration: Goal AUC/-600 mg*hr/L    Additional Antimicrobials: Cefepime 2 g IV x 1 dose ordered in the ED    Pertinent Laboratory Values:   Wt Readings from Last 1 Encounters:   12/31/24 54.4 kg (120 lb)     Temp Readings from Last 1 Encounters:   12/31/24 98.6 °F (37 °C)     Estimated Creatinine Clearance: 54 mL/min (based on SCr of 0.8 mg/dL).  Recent Labs     12/31/24  1115   CREATININE 0.8   BUN 16   WBC 13.3*     Procalcitonin: 0.21    Pertinent Cultures:  Culture Date Source Results   12/31/24 Blood x 2  Sent    MRSA Nasal Swab: N/A. Non-respiratory infection.    Plan:  Dosing recommendations based on Bayesian software  Start vancomycin 1250 mg IV x 1 dose, followed by 750 mg IV every 12 hours   Anticipated AUC of 599 and trough concentration of 17.7 at steady state  Renal labs as indicated   Vancomycin concentration ordered for 01/02 @ 0030   Pharmacy will continue to monitor patient and adjust therapy as indicated    Thank you for the consult,  Sharon Trujillo DOUG  12/31/2024 1:52 PM

## 2024-12-31 NOTE — ED NOTES
4th floor RN aware of patient report in chart.   
Assisted pt transferring to bedside commode. Pt became tachycardic reaching 160 bpm during transfer, but pt tolerated well. POLO Marie notified.   
Faxed records request for [facility].   
Pt had medium-sized soft, formed bm. Pale yellow in color. Two small open sores on coccyx. RN notified.   
N/A  Sepsis Risk Score      Admitted with Sepsis? Yes Repeat LA: Time Due 2 hr    Blood cultures drawn: Yes 1115 BC 1; 1350 BC 2    Fluid resuscitation:  Total given 1000 NS.    Remains hypotensive  No    First antibiotic started: Yes    VS x 2 post-fluid resuscitation:  Yes    Vasopressor Infusion: No  Vasopressor-time initiated     Additional Interventions/Comments:     Recommendation  Incomplete orders:   Patient Belongings: With patient   Additional Comments:   If any further questions, please call Sending RN at 2150    Electronically signed by: Electronically signed by Cynthia Garcia RN on 12/31/2024 at 5:18 PM

## 2025-01-01 LAB
ALBUMIN SERPL-MCNC: 1.7 G/DL (ref 3.5–5.2)
ALP SERPL-CCNC: 269 U/L (ref 35–104)
ALT SERPL-CCNC: 18 U/L (ref 5–33)
ANION GAP SERPL CALCULATED.3IONS-SCNC: 11 MMOL/L (ref 7–19)
AST SERPL-CCNC: 31 U/L (ref 5–32)
BASOPHILS # BLD: 0.1 K/UL (ref 0–0.2)
BASOPHILS NFR BLD: 0.7 % (ref 0–1)
BILIRUB SERPL-MCNC: 0.7 MG/DL (ref 0.2–1.2)
BUN SERPL-MCNC: 16 MG/DL (ref 8–23)
CALCIUM SERPL-MCNC: 6.6 MG/DL (ref 8.8–10.2)
CHLORIDE SERPL-SCNC: 107 MMOL/L (ref 98–111)
CO2 SERPL-SCNC: 22 MMOL/L (ref 22–29)
CREAT SERPL-MCNC: 0.6 MG/DL (ref 0.5–0.9)
EKG P AXIS: 57 DEGREES
EKG P AXIS: 95 DEGREES
EKG P-R INTERVAL: 164 MS
EKG P-R INTERVAL: 190 MS
EKG Q-T INTERVAL: 302 MS
EKG Q-T INTERVAL: 334 MS
EKG QRS DURATION: 92 MS
EKG QRS DURATION: 94 MS
EKG QTC CALCULATION (BAZETT): 401 MS
EKG QTC CALCULATION (BAZETT): 444 MS
EKG T AXIS: 111 DEGREES
EKG T AXIS: 137 DEGREES
EOSINOPHIL # BLD: 0 K/UL (ref 0–0.6)
EOSINOPHIL NFR BLD: 0.1 % (ref 0–5)
ERYTHROCYTE [DISTWIDTH] IN BLOOD BY AUTOMATED COUNT: 15.4 % (ref 11.5–14.5)
GLUCOSE BLD-MCNC: 172 MG/DL (ref 70–99)
GLUCOSE BLD-MCNC: 227 MG/DL (ref 70–99)
GLUCOSE BLD-MCNC: 64 MG/DL (ref 70–99)
GLUCOSE BLD-MCNC: 83 MG/DL (ref 70–99)
GLUCOSE SERPL-MCNC: 230 MG/DL (ref 70–99)
HBA1C MFR BLD: 6.5 % (ref 4–5.6)
HCT VFR BLD AUTO: 29 % (ref 37–47)
HGB BLD-MCNC: 9.4 G/DL (ref 12–16)
IMM GRANULOCYTES # BLD: 0 K/UL
LACTATE BLDV-SCNC: 1.7 MMOL/L (ref 0.5–1.9)
LIPASE SERPL-CCNC: 4 U/L (ref 13–60)
LYMPHOCYTES # BLD: 1.5 K/UL (ref 1.1–4.5)
LYMPHOCYTES NFR BLD: 15.3 % (ref 20–40)
MCH RBC QN AUTO: 34.6 PG (ref 27–31)
MCHC RBC AUTO-ENTMCNC: 32.4 G/DL (ref 33–37)
MCV RBC AUTO: 106.6 FL (ref 81–99)
MONOCYTES # BLD: 0.7 K/UL (ref 0–0.9)
MONOCYTES NFR BLD: 7 % (ref 0–10)
MRSA DNA SPEC QL NAA+PROBE: NOT DETECTED
NEUTROPHILS # BLD: 7.3 K/UL (ref 1.5–7.5)
NEUTS SEG NFR BLD: 76.6 % (ref 50–65)
PERFORMED ON: ABNORMAL
PERFORMED ON: NORMAL
PLATELET # BLD AUTO: 287 K/UL (ref 130–400)
PMV BLD AUTO: 10.4 FL (ref 9.4–12.3)
POTASSIUM SERPL-SCNC: 3.7 MMOL/L (ref 3.5–5)
PROT SERPL-MCNC: 3.4 G/DL (ref 6.4–8.3)
RBC # BLD AUTO: 2.72 M/UL (ref 4.2–5.4)
SODIUM SERPL-SCNC: 140 MMOL/L (ref 136–145)
WBC # BLD AUTO: 9.5 K/UL (ref 4.8–10.8)

## 2025-01-01 PROCEDURE — 93010 ELECTROCARDIOGRAM REPORT: CPT | Performed by: INTERNAL MEDICINE

## 2025-01-01 PROCEDURE — 2580000003 HC RX 258: Performed by: NURSE PRACTITIONER

## 2025-01-01 PROCEDURE — 6360000002 HC RX W HCPCS: Performed by: PEDIATRICS

## 2025-01-01 PROCEDURE — 83690 ASSAY OF LIPASE: CPT

## 2025-01-01 PROCEDURE — 2580000003 HC RX 258: Performed by: PEDIATRICS

## 2025-01-01 PROCEDURE — 6360000002 HC RX W HCPCS: Performed by: HOSPITALIST

## 2025-01-01 PROCEDURE — 83036 HEMOGLOBIN GLYCOSYLATED A1C: CPT

## 2025-01-01 PROCEDURE — 85025 COMPLETE CBC W/AUTO DIFF WBC: CPT

## 2025-01-01 PROCEDURE — 97535 SELF CARE MNGMENT TRAINING: CPT

## 2025-01-01 PROCEDURE — 6370000000 HC RX 637 (ALT 250 FOR IP): Performed by: HOSPITALIST

## 2025-01-01 PROCEDURE — 97162 PT EVAL MOD COMPLEX 30 MIN: CPT

## 2025-01-01 PROCEDURE — 1200000000 HC SEMI PRIVATE

## 2025-01-01 PROCEDURE — 6360000002 HC RX W HCPCS: Performed by: NURSE PRACTITIONER

## 2025-01-01 PROCEDURE — 94760 N-INVAS EAR/PLS OXIMETRY 1: CPT

## 2025-01-01 PROCEDURE — 97165 OT EVAL LOW COMPLEX 30 MIN: CPT

## 2025-01-01 PROCEDURE — 36415 COLL VENOUS BLD VENIPUNCTURE: CPT

## 2025-01-01 PROCEDURE — 82962 GLUCOSE BLOOD TEST: CPT

## 2025-01-01 PROCEDURE — 80053 COMPREHEN METABOLIC PANEL: CPT

## 2025-01-01 PROCEDURE — 97110 THERAPEUTIC EXERCISES: CPT

## 2025-01-01 PROCEDURE — 83605 ASSAY OF LACTIC ACID: CPT

## 2025-01-01 PROCEDURE — 6370000000 HC RX 637 (ALT 250 FOR IP): Performed by: NURSE PRACTITIONER

## 2025-01-01 RX ORDER — CALCIUM GLUCONATE 20 MG/ML
1000 INJECTION, SOLUTION INTRAVENOUS ONCE
Status: COMPLETED | OUTPATIENT
Start: 2025-01-01 | End: 2025-01-01

## 2025-01-01 RX ORDER — PHENAZOPYRIDINE HYDROCHLORIDE 200 MG/1
200 TABLET, FILM COATED ORAL
Status: COMPLETED | OUTPATIENT
Start: 2025-01-01 | End: 2025-01-02

## 2025-01-01 RX ORDER — INSULIN GLARGINE 100 [IU]/ML
10 INJECTION, SOLUTION SUBCUTANEOUS 2 TIMES DAILY
Status: DISCONTINUED | OUTPATIENT
Start: 2025-01-01 | End: 2025-01-02

## 2025-01-01 RX ADMIN — CEFEPIME 2000 MG: 2 INJECTION, POWDER, FOR SOLUTION INTRAVENOUS at 21:45

## 2025-01-01 RX ADMIN — APIXABAN 5 MG: 5 TABLET, FILM COATED ORAL at 21:10

## 2025-01-01 RX ADMIN — CALCIUM GLUCONATE 1000 MG: 20 INJECTION, SOLUTION INTRAVENOUS at 02:12

## 2025-01-01 RX ADMIN — APIXABAN 5 MG: 5 TABLET, FILM COATED ORAL at 08:39

## 2025-01-01 RX ADMIN — ALLOPURINOL 300 MG: 100 TABLET ORAL at 08:39

## 2025-01-01 RX ADMIN — CALCIUM GLUCONATE 1000 MG: 20 INJECTION, SOLUTION INTRAVENOUS at 08:31

## 2025-01-01 RX ADMIN — INSULIN GLARGINE 10 UNITS: 100 INJECTION, SOLUTION SUBCUTANEOUS at 08:40

## 2025-01-01 RX ADMIN — DICYCLOMINE HYDROCHLORIDE 10 MG: 10 CAPSULE ORAL at 06:31

## 2025-01-01 RX ADMIN — PANTOPRAZOLE SODIUM 40 MG: 40 TABLET, DELAYED RELEASE ORAL at 08:39

## 2025-01-01 RX ADMIN — VANCOMYCIN HYDROCHLORIDE 750 MG: 750 INJECTION, POWDER, LYOPHILIZED, FOR SOLUTION INTRAVENOUS at 14:48

## 2025-01-01 RX ADMIN — INSULIN LISPRO 1 UNITS: 100 INJECTION, SOLUTION INTRAVENOUS; SUBCUTANEOUS at 11:10

## 2025-01-01 RX ADMIN — CEFEPIME 2000 MG: 2 INJECTION, POWDER, FOR SOLUTION INTRAVENOUS at 14:49

## 2025-01-01 RX ADMIN — SPIRONOLACTONE 25 MG: 25 TABLET ORAL at 08:39

## 2025-01-01 RX ADMIN — CEFEPIME 2000 MG: 2 INJECTION, POWDER, FOR SOLUTION INTRAVENOUS at 02:51

## 2025-01-01 RX ADMIN — LEVOTHYROXINE SODIUM 100 MCG: 100 TABLET ORAL at 06:31

## 2025-01-01 RX ADMIN — VANCOMYCIN HYDROCHLORIDE 750 MG: 750 INJECTION, POWDER, LYOPHILIZED, FOR SOLUTION INTRAVENOUS at 01:43

## 2025-01-01 RX ADMIN — PHENAZOPYRIDINE HYDROCHLORIDE 200 MG: 200 TABLET ORAL at 21:10

## 2025-01-01 NOTE — PROGRESS NOTES
Pharmacy Renal Adjustment    Jacqueline Richardson is a 74 y.o. female. Pharmacy has renally adjusted medications per protocol.    Recent Labs     12/31/24  1115   BUN 16       Recent Labs     12/31/24  1115   CREATININE 0.8       Estimated Creatinine Clearance: 54 mL/min (based on SCr of 0.8 mg/dL).    Height:   Ht Readings from Last 1 Encounters:   10/16/24 1.651 m (5' 5\")     Weight:  Wt Readings from Last 1 Encounters:   12/31/24 54.4 kg (120 lb)       Plan: Adjust the following medications based on renal function:           Cefepime to 2000 mg IV every 12 hours extended infusion over 240 minutes     Electronically signed by ROGERIO MOHAN RPH on 12/31/2024 at 7:00 PM

## 2025-01-01 NOTE — CARE COORDINATION
Case Management Assessment  Initial Evaluation    Date/Time of Evaluation: 1/1/2025 8:03 AM  Assessment Completed by: Ophelia Fraga RN    If patient is discharged prior to next notation, then this note serves as note for discharge by case management.    Patient Name: Jacqueline Richardson                   YOB: 1950  Diagnosis: General weakness [R53.1]  Sepsis (HCC) [A41.9]  Sepsis, due to unspecified organism, unspecified whether acute organ dysfunction present (HCC) [A41.9]                   Date / Time: 12/31/2024 11:07 AM    Patient Admission Status: Inpatient   Readmission Risk (Low < 19, Mod (19-27), High > 27): Readmission Risk Score: 17.8    Current PCP: Jodi Fine APRN - CNP  PCP verified by CM? Yes    Chart Reviewed: Yes      History Provided by: Patient  Patient Orientation: Alert and Oriented, Person, Place, Situation    Patient Cognition: Alert    Hospitalization in the last 30 days (Readmission):  No    If yes, Readmission Assessment in CM Navigator will be completed.    Advance Directives:      Code Status: Full Code   Patient's Primary Decision Maker is:      Primary Decision Maker: Valentino Richardson - Spouse - [phone]    Discharge Planning:    Patient lives with: Spouse/Significant Other Type of Home: House  Primary Care Giver: Self  Patient Support Systems include: Spouse/Significant Other   Current Financial resources: Medicare  Current community resources: None  Current services prior to admission: Durable Medical Equipment, Home Care            Current DME: Walker            Type of Home Care services:  None    ADLS  Prior functional level: Independent in ADLs/IADLs  Current functional level: Independent in ADLs/IADLs    PT AM-PAC:   /24  OT AM-PAC:   /24    Family can provide assistance at DC: Yes  Would you like Case Management to discuss the discharge plan with any other family members/significant others, and if so, who? Yes (spouse)  Plans to Return to Present Housing: Yes  Other  Identified Issues/Barriers to RETURNING to current housing: mobility  Potential Assistance needed at discharge: Skilled Nursing Facility            Potential DME:    Patient expects to discharge to: Acute rehab  Plan for transportation at discharge: Family    Financial    Payor: MEDICARE / Plan: MEDICARE PART A AND B / Product Type: *No Product type* /     Does insurance require precert for SNF: No    Potential assistance Purchasing Medications: No  Meds-to-Beds request:        [facility] [place] [place] - [address] - P [phone] - F [phone]  [address] [place] [address]  Phone: [phone] Fax: [phone]      Notes:    Factors facilitating achievement of predicted outcomes: Family support, Cooperative, and Pleasant    Barriers to discharge: Decreased endurance and Lower extremity weakness    Additional Case Management Notes:   Patient lives at home with her spouse.  Prior to admission, patient used a walker.  She has a BSC.  She drives, but since her hip surgery her spouse has been providing transportation.  She has been receiving Home Health services through [facility].  Awaiting PT/OT Evals.  She is interested in receiving Rehab and is agreeable to a referral to IP Rehab.    The Plan for Transition of Care is related to the following treatment goals of General weakness [R53.1]  Sepsis (HCC) [A41.9]  Sepsis, due to unspecified organism, unspecified whether acute organ dysfunction present (HCC) [A41.9]    IF APPLICABLE: The Patient and/or patient representative Jacqueline and her family were provided with a choice of provider and agrees with the discharge plan. Freedom of choice list with basic dialogue that supports the patient's individualized plan of care/goals and shares the quality data associated with the providers was provided to: Patient   Patient Representative Name:       The Patient and/or Patient Representative Agree with the Discharge Plan? Yes    Ophelia Fraga RN  Case Management

## 2025-01-01 NOTE — PROGRESS NOTES
[facility] Hospitalists      Progress Note    Patient:  Jacqueline Richardson  YOB: 1950  Date of Service: 1/1/2025  MRN: [id number]   Acct: [id number]   Primary Care Physician: Jodi Fine, APRN - CNP  Advance Directive: Full Code  Admit Date: 12/31/2024       Hospital Day: 1    Portions of this note have been copied forward, however, updated to reflect the most current clinical status of this patient.     CHIEF COMPLAINT weakness     SUBJECTIVE: \"Too weak to get up\"    CUMULATIVE HOSPITAL COURSE:   Jacqueline Richardson is an 74 y.o. female with PMH of recent right hip fx with nailing on 12-15 at [facility], diabetes, HTN, smoker, hypothyroid, aniety and arthritis.  Pt presents to day with three day hx of weakness.  So weak she could not lift herself off the bed.  She also reports some diarrhea.  She had an admission 2 weeks ago for UTI and sepsis per her history.  She also had a DVT post op in her right leg.  She denies fever or cough.  She is having chills in ED.    ED eval Na 139, potassium 3.8, bun 16, creat 0.8, mag 1.5, lactic 3.7 with repeat of 3.2, BNP 1422, troponin 36,and 33, alk phos 384, wbc 13.3, hgb 12.6, hct 38., resp panel neg, urine 2 + bacteria, 1 wbc trace leukocyte.  CXR with right basilar atelectasis,   Pt admitted to hospitalist.  Fluid bolus of 1000 in ED.  Not sepsis bolus due to edema and recent large amount of swelling from last admission.    Lactic returned to normal today.  Patient afebrile and heart rate normal .  Did a repeat med reviewed with patient , several sedating meds eliminated.        Objective:   VITALS:  BP (!) 97/55   Pulse 81   Temp 98.2 °F (36.8 °C) (Temporal)   Resp 18   Wt 62 kg (136 lb 11.2 oz)   SpO2 94%   BMI 22.75 kg/m²   24HR INTAKE/OUTPUT:    Intake/Output Summary (Last 24 hours) at 1/1/2025 0714  Last data filed at 12/31/2024 1313  Gross per 24 hour   Intake --   Output 75 ml   Net -75 ml           Physical Exam  Vitals and nursing note reviewed.  (HCC)  Active Problems:    Diabetes (HCC)    Hypertension    Hypothyroidism    Smoker    Closed subcapital fracture of femur, right, with routine healing, subsequent encounter    Acute urinary tract infection    Diarrhea    History of pancreatectomy  Resolved Problems:    * No resolved hospital problems. *  Principal Problem:    Sepsis (HCC)   Lactic down to 1.7   Cultures pending  Active Problems:    Diabetes (HCC)   Low-dose insulin protocol   Accu-Chek before meals and at bedtime   Hypoglycemic protocol    Hypertension   BP soft   Monitor for need to hold her low-dose beta-blocker    Hypothyroidism   Continue Synthroid    Smoker   Nicotine patch as needed    Closed subcapital fracture of femur, right, with routine healing, subsequent encounter   No complaint of pain today patient operative area    Acute urinary tract infection   Culture pending   Pharmacy to dose bank   Continue cefepime    Diarrhea   GI panel    History of pancreatectomy   Continue Creon  Resolved Problems:    * No resolved hospital problems. *        Antibiotic: Cefepime and vancomycin    DVT Prophylaxis: Eliquis    GI prophylaxis: Protonix    Discharge planning: To be determined      Further Orders per Clinical course/attending.     Electronically signed by ANKUR Kumar CNP on 1/1/2025 at 7:14 AM       EMR Dragon/Transcription disclaimer:   Much of this encounter note is an electronic transcription/translation of spoken language to printed text. The electronic translation of spoken language may permit erroneous, or at times, nonsensical words or phrases to be inadvertently transcribed; although attempts have made to review the note for such errors, some may still exist.

## 2025-01-01 NOTE — PROGRESS NOTES
Occupational Therapy Initial Assessment  Date: 2025   Patient Name: Jacqueline Richardson  MRN: [id number]     : 1950    Date of Service: 2025    Discharge Recommendations:  Patient would benefit from continued therapy after discharge       Assessment   Assessment: Evaluation completed and tx initiated.  The patient would benefit from further skilled therapy to upgrade safety and functional independence.  Treatment Diagnosis: Sepsis, generalized weakness  History: Right hip fx with nail 2024, previous right reverse total shoulder, recent hospitalization at [facility] with sepsis and UTI      REQUIRES OT FOLLOW-UP: Yes  Activity Tolerance  Activity Tolerance: Patient Tolerated treatment well           Patient Diagnosis(es): The primary encounter diagnosis was Sepsis, due to unspecified organism, unspecified whether acute organ dysfunction present (HCC). A diagnosis of General weakness was also pertinent to this visit.   has a past medical history of Acquired diverticulum of bladder, Diabetes (HCC), Hypertension, Smoker, and Thyroid disease.   has a past surgical history that includes hip surgery (Right, 09/15/2024); ep device procedure (N/A, 2024); Appendectomy; Cholecystectomy; Hysterectomy; Pancreatectomy; Splenectomy; and Tonsillectomy.    Treatment Diagnosis: Sepsis, generalized weakness      Restrictions  Restrictions/Precautions  Restrictions/Precautions: Fall Risk  Position Activity Restriction  Other Position/Activity Restrictions: States she had follow up visit with ortho in November and was told to maintain  50% wbing on RLE s/p nailing.  Also states HH stated she could progress her wb status.  She states she continues to follow the 50% wbing. She missed her December ortho follow up appt.    Subjective   General  Chart Reviewed: Yes  Patient assessed for rehabilitation services?: Yes  Family / Caregiver Present: No       Social/Functional History  Social/Functional History  Lives With:  Spouse  Type of Home: House  Home Layout: One level  Home Access: Stairs to enter with rails  Entrance Stairs - Number of Steps: 3  Bathroom Shower/Tub: Walk-in shower  Bathroom Toilet: Handicap height  Bathroom Equipment: Grab bars in shower, Grab bars around toilet  Bathroom Accessibility: Accessible  Home Equipment: Walker - Rolling  Receives Help From: Family  Prior Level of Assist for ADLs: Independent  Prior Level of Assist for Homemaking: Independent  Homemaking Responsibilities: Yes  Prior Level of Assist for Transfers: Independent  Active : Yes  Mode of Transportation: Car  Occupation: Retired       Objective              Toilet Transfers  Toilet - Technique: Stand step  Toilet Transfer: Minimal assistance  Toilet Transfers Comments: with RW  ADL  Feeding: Independent  Grooming: Independent  UE Bathing: Independent  LE Bathing: Minimal assistance  UE Dressing: Independent  LE Dressing: Minimal assistance  Toileting: Minimal assistance        Bed mobility  Supine to Sit: Minimal assistance  Sit to Supine: Minimal assistance  Transfers  Stand Step Transfers: Minimal assistance  Transfer Comments: with RW     Cognition  Overall Cognitive Status: WNL                 LUE AROM (degrees)  LUE AROM : WFL  RUE AROM (degrees)  RUE General AROM: Shoulder flexion 0-90 in supine with difficulty.  Previous reverse TS replacement.  Distally WFL but lacks full elbow extension for APROM  -25 degrees.                    Plan   Occupational Therapy Plan  Times Per Week: 3-5    Goals  Short Term Goals  Short Term Goal 1: Perform transfers with CGA  Short Term Goal 2: Perform dressing with CGA  Short Term Goal 3: Perform toileting with CGA  Short Term Goal 4: Independent with therapeutic activity recommendations, safety, and positioning recommendations  Long Term Goals  Long Term Goal 1: Upgrade as tolerated     Tx initiated: Instructed in mobility techniques. Patient is anxious about falling. Instructed in therapeutic

## 2025-01-01 NOTE — PROGRESS NOTES
Physical Therapy  Facility/Department: [facility] ONCOLOGY UNIT  Physical Therapy Initial Assessment    Name: Jacqueline Richardson  : 1950  MRN: [id number]  Date of Service: 2025    Discharge Recommendations:  Continue to assess pending progress, 24 hour supervision or assist, Patient would benefit from continued therapy after discharge          Patient Diagnosis(es): The primary encounter diagnosis was Sepsis, due to unspecified organism, unspecified whether acute organ dysfunction present (HCC). A diagnosis of General weakness was also pertinent to this visit.  Past Medical History:  has a past medical history of Acquired diverticulum of bladder, Diabetes (HCC), Hypertension, Smoker, and Thyroid disease.  Past Surgical History:  has a past surgical history that includes hip surgery (Right, 09/15/2024); ep device procedure (N/A, 2024); Appendectomy; Cholecystectomy; Hysterectomy; Pancreatectomy; Splenectomy; and Tonsillectomy.    Assessment  Body Structures, Functions, Activity Limitations Requiring Skilled Therapeutic Intervention: Decreased functional mobility ;Decreased ADL status;Decreased strength;Decreased safe awareness;Decreased balance;Decreased posture  Assessment: Pt. will benefit from cont. PT to decrease impairments. Pt. reports feeling generally weak and states she was not able to get to BSC earlier in the day. Pt. wanted to walk farther after getting to chair, but she reported dizziness. Pt. allowed to amb. 5' forward and 5' back with A from PT/OT. Pt's WB status needs to be clarified. She states she missed her last follow up with orthopedics, but she had been allowed to progress past 50% WB RLE, per HHPT.  Treatment Diagnosis: impaired gait and mobility  Therapy Prognosis: Good  Decision Making: Medium Complexity  Barriers to Learning: none noted  Requires PT Follow-Up: Yes  Activity Tolerance  Activity Tolerance: Patient limited by fatigue;Treatment limited secondary to medical

## 2025-01-01 NOTE — PROGRESS NOTES
RN noted some questionable confusion as day progressed. Pt had some difficulty understanding cues made by nursing staff to assist her in repositioning in bed. Pt attempted to stand on multiple occurrences despite being cued to put her feet on bed to be pulled up.   Pt seems appropriate during conversations, but behaviors seem impulsive or potentially just disoriented. Neuro assessment remains baseline.

## 2025-01-01 NOTE — PROGRESS NOTES
Pharmacy Adjustment per [facility] protocol    Jacqueline Richardson is a 74 y.o. female. Pharmacy has adjusted medications per [facility] protocol.    Recent Labs     12/31/24  1115 01/01/25  0037   BUN 16 16       Recent Labs     12/31/24  1115 01/01/25  0037   CREATININE 0.8 0.6       Estimated Creatinine Clearance: 75 mL/min (based on SCr of 0.6 mg/dL).    Height:   Ht Readings from Last 1 Encounters:   10/16/24 1.651 m (5' 5\")     Weight:  Wt Readings from Last 1 Encounters:   01/01/25 62 kg (136 lb 11.2 oz)    BMI:  BMI Readings from Last 1 Encounters:   01/01/25 22.75 kg/m²         Plan: Adjust the following medications based on [facility] protocol:           Cefepime to 2000 mg IV every 8 hours extended infusion over 240 minutes      Electronically signed by Britt Kilgore RPh, BCPS, 1/1/2025,2:21 PM

## 2025-01-02 ENCOUNTER — HOSPITAL ENCOUNTER (INPATIENT)
Age: 75
LOS: 15 days | Discharge: HOME HEALTH | End: 2025-01-17
Attending: PSYCHIATRY & NEUROLOGY | Admitting: PSYCHIATRY & NEUROLOGY
Payer: MEDICARE

## 2025-01-02 VITALS
DIASTOLIC BLOOD PRESSURE: 73 MMHG | BODY MASS INDEX: 22.75 KG/M2 | SYSTOLIC BLOOD PRESSURE: 109 MMHG | HEART RATE: 89 BPM | OXYGEN SATURATION: 96 % | WEIGHT: 136.7 LBS | TEMPERATURE: 97.5 F | RESPIRATION RATE: 20 BRPM

## 2025-01-02 DIAGNOSIS — M25.551 PAIN OF RIGHT HIP JOINT: ICD-10-CM

## 2025-01-02 DIAGNOSIS — A41.9 SEPSIS, DUE TO UNSPECIFIED ORGANISM, UNSPECIFIED WHETHER ACUTE ORGAN DYSFUNCTION PRESENT (HCC): Primary | ICD-10-CM

## 2025-01-02 PROBLEM — R65.10 SIRS (SYSTEMIC INFLAMMATORY RESPONSE SYNDROME) (HCC): Status: ACTIVE | Noted: 2025-01-02

## 2025-01-02 LAB
ADV 40+41 DNA STL QL NAA+NON-PROBE: NOT DETECTED
ALBUMIN SERPL-MCNC: 2.3 G/DL (ref 3.5–5.2)
ALP SERPL-CCNC: 319 U/L (ref 35–104)
ALT SERPL-CCNC: 23 U/L (ref 5–33)
ANION GAP SERPL CALCULATED.3IONS-SCNC: 16 MMOL/L (ref 7–19)
AST SERPL-CCNC: 40 U/L (ref 5–32)
BASOPHILS # BLD: 0.1 K/UL (ref 0–0.2)
BASOPHILS NFR BLD: 1.2 % (ref 0–1)
BILIRUB SERPL-MCNC: 0.8 MG/DL (ref 0.2–1.2)
BUN SERPL-MCNC: 17 MG/DL (ref 8–23)
C CAYETANENSIS DNA STL QL NAA+NON-PROBE: NOT DETECTED
C COLI+JEJ+UPSA DNA STL QL NAA+NON-PROBE: NOT DETECTED
CALCIUM SERPL-MCNC: 7.1 MG/DL (ref 8.8–10.2)
CHLORIDE SERPL-SCNC: 107 MMOL/L (ref 98–111)
CO2 SERPL-SCNC: 18 MMOL/L (ref 22–29)
CREAT SERPL-MCNC: 0.8 MG/DL (ref 0.5–0.9)
CRYPTOSP DNA STL QL NAA+NON-PROBE: NOT DETECTED
E HISTOLYT DNA STL QL NAA+NON-PROBE: NOT DETECTED
EAEC PAA PLAS AGGR+AATA ST NAA+NON-PRB: NOT DETECTED
EC STX1+STX2 GENES STL QL NAA+NON-PROBE: NOT DETECTED
EOSINOPHIL # BLD: 0.1 K/UL (ref 0–0.6)
EOSINOPHIL NFR BLD: 1.4 % (ref 0–5)
EPEC EAE GENE STL QL NAA+NON-PROBE: NOT DETECTED
ERYTHROCYTE [DISTWIDTH] IN BLOOD BY AUTOMATED COUNT: 15.2 % (ref 11.5–14.5)
ETEC LTA+ST1A+ST1B TOX ST NAA+NON-PROBE: NOT DETECTED
G LAMBLIA DNA STL QL NAA+NON-PROBE: NOT DETECTED
GI PATH DNA+RNA PNL STL NAA+NON-PROBE: NOT DETECTED
GLUCOSE BLD-MCNC: 153 MG/DL (ref 70–99)
GLUCOSE BLD-MCNC: 228 MG/DL (ref 70–99)
GLUCOSE BLD-MCNC: 290 MG/DL (ref 70–99)
GLUCOSE BLD-MCNC: 51 MG/DL (ref 70–99)
GLUCOSE BLD-MCNC: 75 MG/DL (ref 70–99)
GLUCOSE BLD-MCNC: 87 MG/DL (ref 70–99)
GLUCOSE BLD-MCNC: 91 MG/DL (ref 70–99)
GLUCOSE SERPL-MCNC: 108 MG/DL (ref 70–99)
HCT VFR BLD AUTO: 39.6 % (ref 37–47)
HGB BLD-MCNC: 12.7 G/DL (ref 12–16)
IMM GRANULOCYTES # BLD: 0 K/UL
LYMPHOCYTES # BLD: 2 K/UL (ref 1.1–4.5)
LYMPHOCYTES NFR BLD: 23 % (ref 20–40)
MAGNESIUM SERPL-MCNC: 1.5 MG/DL (ref 1.6–2.4)
MCH RBC QN AUTO: 34.8 PG (ref 27–31)
MCHC RBC AUTO-ENTMCNC: 32.1 G/DL (ref 33–37)
MCV RBC AUTO: 108.5 FL (ref 81–99)
MONOCYTES # BLD: 0.7 K/UL (ref 0–0.9)
MONOCYTES NFR BLD: 7.6 % (ref 0–10)
NEUTROPHILS # BLD: 5.7 K/UL (ref 1.5–7.5)
NEUTS SEG NFR BLD: 66.5 % (ref 50–65)
NOROVIRUS GI+II RNA STL QL NAA+NON-PROBE: NOT DETECTED
P SHIGELLOIDES DNA STL QL NAA+NON-PROBE: NOT DETECTED
PERFORMED ON: ABNORMAL
PERFORMED ON: NORMAL
PLATELET # BLD AUTO: 320 K/UL (ref 130–400)
PMV BLD AUTO: 11.1 FL (ref 9.4–12.3)
POTASSIUM SERPL-SCNC: 3.5 MMOL/L (ref 3.5–5)
PROT SERPL-MCNC: 4.3 G/DL (ref 6.4–8.3)
RBC # BLD AUTO: 3.65 M/UL (ref 4.2–5.4)
REASON FOR REJECTION: NORMAL
REJECTED TEST: NORMAL
RVA RNA STL QL NAA+NON-PROBE: NOT DETECTED
S ENT+BONG DNA STL QL NAA+NON-PROBE: NOT DETECTED
SAPO I+II+IV+V RNA STL QL NAA+NON-PROBE: NOT DETECTED
SHIGELLA SP+EIEC IPAH ST NAA+NON-PROBE: NOT DETECTED
SODIUM SERPL-SCNC: 141 MMOL/L (ref 136–145)
V CHOL+PARA+VUL DNA STL QL NAA+NON-PROBE: NOT DETECTED
V CHOLERAE DNA STL QL NAA+NON-PROBE: NOT DETECTED
VANCOMYCIN TROUGH SERPL-MCNC: 18.7 UG/ML (ref 10–20)
VIT B12 SERPL-MCNC: >2000 PG/ML (ref 232–1245)
WBC # BLD AUTO: 8.6 K/UL (ref 4.8–10.8)
Y ENTEROCOL DNA STL QL NAA+NON-PROBE: NOT DETECTED

## 2025-01-02 PROCEDURE — 6370000000 HC RX 637 (ALT 250 FOR IP): Performed by: HOSPITALIST

## 2025-01-02 PROCEDURE — 87507 IADNA-DNA/RNA PROBE TQ 12-25: CPT

## 2025-01-02 PROCEDURE — 82607 VITAMIN B-12: CPT

## 2025-01-02 PROCEDURE — 83735 ASSAY OF MAGNESIUM: CPT

## 2025-01-02 PROCEDURE — 1180000000 HC REHAB R&B

## 2025-01-02 PROCEDURE — 36415 COLL VENOUS BLD VENIPUNCTURE: CPT

## 2025-01-02 PROCEDURE — 82962 GLUCOSE BLOOD TEST: CPT

## 2025-01-02 PROCEDURE — 2500000003 HC RX 250 WO HCPCS: Performed by: PSYCHIATRY & NEUROLOGY

## 2025-01-02 PROCEDURE — 94760 N-INVAS EAR/PLS OXIMETRY 1: CPT

## 2025-01-02 PROCEDURE — 80202 ASSAY OF VANCOMYCIN: CPT

## 2025-01-02 PROCEDURE — 6370000000 HC RX 637 (ALT 250 FOR IP): Performed by: PSYCHIATRY & NEUROLOGY

## 2025-01-02 PROCEDURE — 80053 COMPREHEN METABOLIC PANEL: CPT

## 2025-01-02 PROCEDURE — 6360000002 HC RX W HCPCS: Performed by: NURSE PRACTITIONER

## 2025-01-02 PROCEDURE — 2580000003 HC RX 258: Performed by: PEDIATRICS

## 2025-01-02 PROCEDURE — 2580000003 HC RX 258: Performed by: NURSE PRACTITIONER

## 2025-01-02 PROCEDURE — 94150 VITAL CAPACITY TEST: CPT

## 2025-01-02 PROCEDURE — 6370000000 HC RX 637 (ALT 250 FOR IP): Performed by: NURSE PRACTITIONER

## 2025-01-02 PROCEDURE — 97116 GAIT TRAINING THERAPY: CPT

## 2025-01-02 PROCEDURE — 85025 COMPLETE CBC W/AUTO DIFF WBC: CPT

## 2025-01-02 PROCEDURE — 6360000002 HC RX W HCPCS: Performed by: PEDIATRICS

## 2025-01-02 PROCEDURE — 97110 THERAPEUTIC EXERCISES: CPT

## 2025-01-02 RX ORDER — INSULIN GLARGINE 100 [IU]/ML
5 INJECTION, SOLUTION SUBCUTANEOUS 2 TIMES DAILY
Status: CANCELLED | OUTPATIENT
Start: 2025-01-02

## 2025-01-02 RX ORDER — MULTIVITAMIN WITH IRON
1 TABLET ORAL DAILY
Status: DISCONTINUED | OUTPATIENT
Start: 2025-01-02 | End: 2025-01-02

## 2025-01-02 RX ORDER — ACETAMINOPHEN 325 MG/1
650 TABLET ORAL EVERY 4 HOURS PRN
Status: DISCONTINUED | OUTPATIENT
Start: 2025-01-02 | End: 2025-01-17 | Stop reason: HOSPADM

## 2025-01-02 RX ORDER — DEXTROSE MONOHYDRATE 100 MG/ML
INJECTION, SOLUTION INTRAVENOUS CONTINUOUS PRN
Status: DISCONTINUED | OUTPATIENT
Start: 2025-01-02 | End: 2025-01-03 | Stop reason: SDUPTHER

## 2025-01-02 RX ORDER — LEVOTHYROXINE SODIUM 50 UG/1
100 TABLET ORAL DAILY
Status: DISCONTINUED | OUTPATIENT
Start: 2025-01-03 | End: 2025-01-17 | Stop reason: HOSPADM

## 2025-01-02 RX ORDER — POLYETHYLENE GLYCOL 3350 17 G/17G
17 POWDER, FOR SOLUTION ORAL DAILY PRN
Status: DISCONTINUED | OUTPATIENT
Start: 2025-01-02 | End: 2025-01-17 | Stop reason: HOSPADM

## 2025-01-02 RX ORDER — LOPERAMIDE HYDROCHLORIDE 2 MG/1
2 CAPSULE ORAL 4 TIMES DAILY PRN
Status: DISCONTINUED | OUTPATIENT
Start: 2025-01-02 | End: 2025-01-02 | Stop reason: HOSPADM

## 2025-01-02 RX ORDER — METOPROLOL SUCCINATE 25 MG/1
25 TABLET, EXTENDED RELEASE ORAL 2 TIMES DAILY
Status: DISCONTINUED | OUTPATIENT
Start: 2025-01-02 | End: 2025-01-02 | Stop reason: HOSPADM

## 2025-01-02 RX ORDER — INSULIN LISPRO 100 [IU]/ML
0-4 INJECTION, SOLUTION INTRAVENOUS; SUBCUTANEOUS
Status: CANCELLED | OUTPATIENT
Start: 2025-01-02

## 2025-01-02 RX ORDER — INSULIN GLARGINE 100 [IU]/ML
5 INJECTION, SOLUTION SUBCUTANEOUS 2 TIMES DAILY
Status: DISCONTINUED | OUTPATIENT
Start: 2025-01-02 | End: 2025-01-02 | Stop reason: HOSPADM

## 2025-01-02 RX ORDER — MULTIVITAMIN WITH IRON
1 TABLET ORAL DAILY
Status: DISCONTINUED | OUTPATIENT
Start: 2025-01-03 | End: 2025-01-17 | Stop reason: HOSPADM

## 2025-01-02 RX ORDER — LOPERAMIDE HYDROCHLORIDE 2 MG/1
2 CAPSULE ORAL 4 TIMES DAILY PRN
Status: DISCONTINUED | OUTPATIENT
Start: 2025-01-02 | End: 2025-01-17 | Stop reason: HOSPADM

## 2025-01-02 RX ORDER — METOPROLOL SUCCINATE 25 MG/1
25 TABLET, EXTENDED RELEASE ORAL 2 TIMES DAILY
Status: DISCONTINUED | OUTPATIENT
Start: 2025-01-02 | End: 2025-01-04

## 2025-01-02 RX ORDER — VANCOMYCIN 1 G/200ML
1000 INJECTION, SOLUTION INTRAVENOUS EVERY 24 HOURS
Status: DISCONTINUED | OUTPATIENT
Start: 2025-01-03 | End: 2025-01-02

## 2025-01-02 RX ORDER — ONDANSETRON 2 MG/ML
4 INJECTION INTRAMUSCULAR; INTRAVENOUS EVERY 6 HOURS PRN
Status: CANCELLED | OUTPATIENT
Start: 2025-01-02

## 2025-01-02 RX ORDER — INSULIN GLARGINE 100 [IU]/ML
5 INJECTION, SOLUTION SUBCUTANEOUS 2 TIMES DAILY
Status: DISCONTINUED | OUTPATIENT
Start: 2025-01-02 | End: 2025-01-06

## 2025-01-02 RX ORDER — ONDANSETRON 2 MG/ML
4 INJECTION INTRAMUSCULAR; INTRAVENOUS EVERY 6 HOURS PRN
Status: DISCONTINUED | OUTPATIENT
Start: 2025-01-02 | End: 2025-01-17 | Stop reason: HOSPADM

## 2025-01-02 RX ORDER — SPIRONOLACTONE 25 MG/1
25 TABLET ORAL DAILY
Status: CANCELLED | OUTPATIENT
Start: 2025-01-03

## 2025-01-02 RX ORDER — PANTOPRAZOLE SODIUM 40 MG/1
40 TABLET, DELAYED RELEASE ORAL DAILY
Status: DISCONTINUED | OUTPATIENT
Start: 2025-01-03 | End: 2025-01-17 | Stop reason: HOSPADM

## 2025-01-02 RX ORDER — INSULIN LISPRO 100 [IU]/ML
0-4 INJECTION, SOLUTION INTRAVENOUS; SUBCUTANEOUS
Status: DISCONTINUED | OUTPATIENT
Start: 2025-01-02 | End: 2025-01-17 | Stop reason: HOSPADM

## 2025-01-02 RX ORDER — METOPROLOL SUCCINATE 25 MG/1
25 TABLET, EXTENDED RELEASE ORAL 2 TIMES DAILY
Status: CANCELLED | OUTPATIENT
Start: 2025-01-02

## 2025-01-02 RX ORDER — LANOLIN ALCOHOL/MO/W.PET/CERES
400 CREAM (GRAM) TOPICAL DAILY
Status: DISCONTINUED | OUTPATIENT
Start: 2025-01-03 | End: 2025-01-17 | Stop reason: HOSPADM

## 2025-01-02 RX ORDER — ERGOCALCIFEROL 1.25 MG/1
50000 CAPSULE, LIQUID FILLED ORAL
Status: CANCELLED | OUTPATIENT
Start: 2025-01-06

## 2025-01-02 RX ORDER — LANOLIN ALCOHOL/MO/W.PET/CERES
400 CREAM (GRAM) TOPICAL DAILY
Status: CANCELLED | OUTPATIENT
Start: 2025-01-03

## 2025-01-02 RX ORDER — LEVOTHYROXINE SODIUM 100 UG/1
100 TABLET ORAL DAILY
Status: CANCELLED | OUTPATIENT
Start: 2025-01-03

## 2025-01-02 RX ORDER — DEXTROSE MONOHYDRATE 100 MG/ML
INJECTION, SOLUTION INTRAVENOUS CONTINUOUS PRN
Status: CANCELLED | OUTPATIENT
Start: 2025-01-02

## 2025-01-02 RX ORDER — ERGOCALCIFEROL 1.25 MG/1
50000 CAPSULE, LIQUID FILLED ORAL
Status: DISCONTINUED | OUTPATIENT
Start: 2025-01-06 | End: 2025-01-17 | Stop reason: HOSPADM

## 2025-01-02 RX ORDER — SPIRONOLACTONE 25 MG/1
25 TABLET ORAL DAILY
Status: DISCONTINUED | OUTPATIENT
Start: 2025-01-03 | End: 2025-01-04

## 2025-01-02 RX ORDER — LANOLIN ALCOHOL/MO/W.PET/CERES
400 CREAM (GRAM) TOPICAL DAILY
Status: DISCONTINUED | OUTPATIENT
Start: 2025-01-02 | End: 2025-01-02 | Stop reason: HOSPADM

## 2025-01-02 RX ORDER — PANTOPRAZOLE SODIUM 40 MG/1
40 TABLET, DELAYED RELEASE ORAL DAILY
Status: CANCELLED | OUTPATIENT
Start: 2025-01-03

## 2025-01-02 RX ORDER — INSULIN LISPRO 100 [IU]/ML
0-4 INJECTION, SOLUTION INTRAVENOUS; SUBCUTANEOUS
Status: DISCONTINUED | OUTPATIENT
Start: 2025-01-02 | End: 2025-01-02

## 2025-01-02 RX ADMIN — LOPERAMIDE HYDROCHLORIDE 2 MG: 2 CAPSULE ORAL at 14:19

## 2025-01-02 RX ADMIN — APIXABAN 5 MG: 5 TABLET, FILM COATED ORAL at 10:34

## 2025-01-02 RX ADMIN — CEFEPIME 2000 MG: 2 INJECTION, POWDER, FOR SOLUTION INTRAVENOUS at 17:47

## 2025-01-02 RX ADMIN — PHENAZOPYRIDINE HYDROCHLORIDE 200 MG: 200 TABLET ORAL at 10:39

## 2025-01-02 RX ADMIN — INSULIN GLARGINE 5 UNITS: 100 INJECTION, SOLUTION SUBCUTANEOUS at 20:38

## 2025-01-02 RX ADMIN — Medication 400 MG: at 10:34

## 2025-01-02 RX ADMIN — METOPROLOL SUCCINATE 25 MG: 25 TABLET, EXTENDED RELEASE ORAL at 20:38

## 2025-01-02 RX ADMIN — MICONAZOLE NITRATE: 2 POWDER TOPICAL at 20:38

## 2025-01-02 RX ADMIN — ERGOCALCIFEROL 50000 UNITS: 1.25 CAPSULE ORAL at 10:34

## 2025-01-02 RX ADMIN — INSULIN GLARGINE 5 UNITS: 100 INJECTION, SOLUTION SUBCUTANEOUS at 10:34

## 2025-01-02 RX ADMIN — PHENAZOPYRIDINE HYDROCHLORIDE 200 MG: 200 TABLET ORAL at 14:06

## 2025-01-02 RX ADMIN — PANCRELIPASE LIPASE, PANCRELIPASE PROTEASE, PANCRELIPASE AMYLASE 5000 UNITS: 5000; 17000; 24000 CAPSULE, DELAYED RELEASE ORAL at 10:34

## 2025-01-02 RX ADMIN — SPIRONOLACTONE 25 MG: 25 TABLET ORAL at 10:34

## 2025-01-02 RX ADMIN — INSULIN LISPRO 2 UNITS: 100 INJECTION, SOLUTION INTRAVENOUS; SUBCUTANEOUS at 10:33

## 2025-01-02 RX ADMIN — METOPROLOL SUCCINATE 25 MG: 25 TABLET, EXTENDED RELEASE ORAL at 10:34

## 2025-01-02 RX ADMIN — APIXABAN 5 MG: 5 TABLET, FILM COATED ORAL at 20:38

## 2025-01-02 RX ADMIN — VANCOMYCIN HYDROCHLORIDE 750 MG: 750 INJECTION, POWDER, LYOPHILIZED, FOR SOLUTION INTRAVENOUS at 01:51

## 2025-01-02 RX ADMIN — LEVOTHYROXINE SODIUM 100 MCG: 100 TABLET ORAL at 10:34

## 2025-01-02 RX ADMIN — LOPERAMIDE HYDROCHLORIDE 2 MG: 2 CAPSULE ORAL at 21:07

## 2025-01-02 RX ADMIN — PANTOPRAZOLE SODIUM 40 MG: 40 TABLET, DELAYED RELEASE ORAL at 10:34

## 2025-01-02 ASSESSMENT — PAIN DESCRIPTION - LOCATION: LOCATION: PELVIS;OTHER (COMMENT)

## 2025-01-02 ASSESSMENT — PAIN SCALES - GENERAL: PAINLEVEL_OUTOF10: 4

## 2025-01-02 ASSESSMENT — LIFESTYLE VARIABLES: HOW OFTEN DO YOU HAVE A DRINK CONTAINING ALCOHOL: NEVER

## 2025-01-02 ASSESSMENT — PAIN DESCRIPTION - DESCRIPTORS: DESCRIPTORS: BURNING

## 2025-01-02 NOTE — PROGRESS NOTES
4 Eyes Skin Assessment     NAME:  Jacqueline Richardson  YOB: 1950  MEDICAL RECORD NUMBER:  [id number]    The patient is being assessed for  Admission    I agree that at least one RN has performed a thorough Head to Toe Skin Assessment on the patient. ALL assessment sites listed below have been assessed.      Areas assessed by both nurses:    Head, Face, Ears, Shoulders, Back, Chest, Arms, Elbows, Hands, Sacrum. Buttock, Coccyx, Ischium, and Legs. Feet and Heels        Does the Patient have a Wound? Yes wound(s) were present on assessment. LDA wound assessment was Initiated and completed by RN       Randy Prevention initiated by RN: Yes  Wound Care Orders initiated by RN: Yes    Pressure Injury (Stage 3,4, Unstageable, DTI, NWPT, and Complex wounds) if present, place Wound referral order by RN under : No    New Ostomies, if present place, Ostomy referral order under : No     Nurse 1 eSignature: Electronically signed by Sharon Galloway RN on 1/2/25 at 4:25 PM CST    **SHARE this note so that the co-signing nurse can place an eSignature**    Nurse 2 eSignature: Electronically signed by Екатерина Davis RN on 1/2/25 at 4:37 PM CST

## 2025-01-02 NOTE — PROGRESS NOTES
Pharmacy Renal Adjustment    Jacqueline Richardson is a 74 y.o. female. Pharmacy has renally adjusted medications per protocol.    Recent Labs     01/01/25  0037 01/02/25  0134   BUN 16 17       Recent Labs     01/01/25  0037 01/02/25  0134   CREATININE 0.6 0.8       Estimated Creatinine Clearance: 56 mL/min (based on SCr of 0.8 mg/dL).    Height:   Ht Readings from Last 1 Encounters:   01/02/25 1.651 m (5' 5\")     Weight:  Wt Readings from Last 1 Encounters:   01/02/25 61 kg (134 lb 8 oz)           Plan: Adjust the following medications based on renal function:           Cefepime to 2000mg IV over 4 hr infusion every 12 hours for sepsis per protocol.    Electronically signed by Deisy Penn RPH on 1/2/2025 at 4:18 PM

## 2025-01-02 NOTE — CARE COORDINATION
[facility]  Notification of Admission Decision      [x] [facility]18      Please write discharge orders and summary prior to discharge.    [] Patient acceptance to Rehab pending the following :    [] Eval in progress       [] Patient determined to be ineligible for services at [facility] because :       We recommend you consider        Thank you for your referral, we appreciate you. If you have any questions, please feel   free to contact me at [phone].  Electronically Signed by Tatyana Hoffman, Admissions Coordinator 1/2/2025 12:56 PM

## 2025-01-02 NOTE — PROGRESS NOTES
Per clinical guidelines a stage 2 pressure injury is not an appropriate Wound Ostomy consult. Please refer to the wound care order set that states appropriate treatment for this pressure injury is to cover with a silicone border and reassess skin daily.    Electronically signed by Rafaela Eduardo RN on 1/2/25 at 7:07 AM CST

## 2025-01-02 NOTE — PROGRESS NOTES
[facility]   Pharmacy Pharmacokinetic Monitoring Service - Vancomycin    Consulting Provider: Blake  Indication: Sepsis of Unknown Etiology  Target Concentration: Goal AUC/-600 mg*hr/L  Day of Therapy: 3  Additional Antimicrobials: cefepime    Pertinent Laboratory Values:   Wt Readings from Last 1 Encounters:   01/01/25 62 kg (136 lb 11.2 oz)     Temp Readings from Last 1 Encounters:   01/01/25 98.5 °F (36.9 °C) (Temporal)     Estimated Creatinine Clearance: 75 mL/min (based on SCr of 0.6 mg/dL).  Recent Labs     12/31/24  1115 01/01/25  0037   CREATININE 0.8 0.6   BUN 16 16   WBC 13.3* 9.5     Procalcitonin: 0.21 12/31    Pertinent Cultures:  Culture Date Source Results   12/31/24 Blood x2 Sent    MRSA Nasal Swab: N/A. Non-respiratory infection.    Recent vancomycin administrations                     vancomycin (VANCOCIN) 750 mg in sodium chloride 0.9 % 250 mL IVPB ([id number]) (mg) 750 mg New Bag 01/02/25 0151     750 mg New Bag 01/01/25 1448     750 mg New Bag  0143    vancomycin (VANCOCIN) 1250 mg in 250 mL IVPB (mg) 1,250 mg New Bag 12/31/24 1526                    Assessment:  Date/Time Current Dose Concentration Timing of Concentration (h) AUC   1/2/25 0230 750mg IV q12h 18.7 10 h 46 min 601   Note: Serum concentrations collected for AUC dosing may appear elevated if collected in close proximity to the dose administered, this is not necessarily an indication of toxicity    Plan:  Current dosing regimen is supra-therapeutic  \"Decrease dose to 1000mg IV q24h  Repeat vancomycin concentration ordered for 1/5 @ 0100   Pharmacy will continue to monitor patient and adjust therapy as indicated    Loading dose: N/A  Regimen: 1000 mg IV every 24 hours.  Start time: 13:51 on 01/02/2025  Exposure target: AUC24 (range)400-600 mg/L.hr   GFI85-05: 445 mg/L.hr  AUC24,ss: 409 mg/L.hr  Probability of AUC24 > 400: 57 %  Ctrough,ss: 8.9 mg/L  Probability of Ctrough,ss > 20: 0 %      Thank you for the

## 2025-01-02 NOTE — PROGRESS NOTES
Pt complained of chronic loose stools, states she is off routine with zenpep, which usually helps some. Requested something for diarrhea at this time. Stools rage from soft formed to loose and watery. Multiple loose stools daily. GI panel negative. Orders placed per APRN

## 2025-01-02 NOTE — PLAN OF CARE
[facility]PATIENT [facility] TREATMENT PLAN      Jacqueline Richardson    : 1950  [id number]  MRN: [id number]   PHYSICIAN:  Matt Nice MD  Primary Problem    Patient Active Problem List   Diagnosis    Syncope    Diabetes (HCC)    Hypertension    Hypothyroidism    Syncope and collapse    Nondisplaced fracture of neck of right femur (HCC)    Smoker    Closed subcapital fracture of femur, right, with routine healing, subsequent encounter    4-part fracture of surgical neck of right humerus, initial encounter for closed fracture    Acute sinusitis    Acute urinary tract infection    Adenomatous polyp of colon    Arthritis    Gastritis    Benign neoplasm of colon    Candidiasis of skin    Chronic low back pain    Chronic pancreatitis (HCC)    Closed fracture of hip (HCC)    Colon polyp    COVID-19    Diarrhea    Irritable bowel syndrome with diarrhea    Diverticular disease of colon    Diverticulitis    Dizziness    Dysuria    Elevated liver enzymes    Epigastric pain    Fever with chills    Gastroesophageal reflux disease without esophagitis    Generalized abdominal pain    Gout    Generalized anxiety disorder    History of pancreatectomy    History of pancreatic islet cell transplantation    Hypokalemia    Infectious colitis    Mixed hyperlipidemia    Moderate recurrent major depression (HCC)    Nicotine dependence    Osteoarthritis of multiple joints    Osteopenia    Otitis media    Pain of right hip joint    Pancreatic insufficiency    Seasonal allergies    Vitamin D deficiency    Sepsis (HCC)    SIRS (systemic inflammatory response syndrome) ([facility])       Rehabilitation Diagnosis:     Sepsis (HCC) [A41.9]       ADMIT DATE:2025   CARE PLAN     NURSING:  Jacqueline Richardson while on this unit will:     [] Be continent of bowel and bladder      [x] Have an adequate number of bowel movements   [x] Urinate with no urinary retention >300ml in bladder   [] Complete bladder protocol with callahan removal   [] Maintain O2  household distances independently with assistive device.  IPOC brief synthesis: Acute inpatient rehabilitation with occupational therapy,  physical therapy, 180 minutes, 5 every 7   days will address basic and advancing mobility with self-care   instruction and adaptive equipment training. Caregiver education will   be offered. Expected length of stay prior to the supervised level of   functional discharge to home with home care is 13 days.    Assessment and Plan:  1.  Sepsis-complete antibiotics  2.  History of DVT-on Eliquis  3.  Diabetes-hold insulin due to hypoglycemia  4.  Hypothyroidism-Synthroid  5.  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  Hypertension-on meds monitor  7.  GI-PPI/bowel regimen  8.  Vitamin D deficiency-on vitamin D  9.  Urinary incontinence/diverticulum of bladder-monitor  10.  Diarrhea-Imodium as needed  11.  PT/OT             Case Mgmt: Electronically signed by ARPAN Patino on 1/3/25 at 8:51 AM CST     OT: Electronically signed by Radha Arango OT on 1/3/25 at 4:39 PM CST     PT:Electronically signed by Dewey Simpson PT on 1/3/25 at 11:15 AM CST      RN: Electronically signed by Екатерина Davis RN on 1/2/25 at 3:22 PM CST     ST:  Electronically Signed By:  Lashawn Suazo M.S., CCC-SLP  1/3/2025,7:28 AM.

## 2025-01-02 NOTE — PROGRESS NOTES
Jacqueline Richardson arrived to room # 818.   Presented with: Sepsis  Mental Status: Patient is oriented and alert.   Vitals:    01/02/25 1520   BP: 116/87   Pulse: 73   Resp: 18   Temp: (!) 96.5 °F (35.8 °C)   SpO2: 100%     Patient safety contract and falls prevention contract reviewed with patient Yes.  Oriented Patient to room.  Call light within reach. Yes.  Needs, issues or concerns expressed at this time: no.      Electronically signed by Екатерина aDvis RN on 1/2/2025 at 3:23 PM

## 2025-01-02 NOTE — DISCHARGE SUMMARY
Discharge Summary    NAME: Jacqueline Richardson  :  1950  MRN:  [id number]    Admit date:  2024  Discharge date:  2024    Admitting Physician:  Merrick Lane MD    Advance Directive: Full Code    Consults: none    Primary Care Physician:  Jodi Fine, APRN - CNP    Discharge Diagnoses:  Principal Problem:    SIRS (systemic inflammatory response syndrome) (HCC)  Active Problems:    Diabetes (HCC)    Hypertension    Hypothyroidism    Smoker    Closed subcapital fracture of femur, right, with routine healing, subsequent encounter    Acute urinary tract infection    Diarrhea    History of pancreatectomy  Resolved Problems:    * No resolved hospital problems. *      Significant Diagnostic Studies:   XR CHEST PORTABLE    Result Date: 2024  EXAM: CHEST RADIOGRAPH  TECHNIQUE: Single frontal chest radiograph.  HISTORY: Shortness of breath.  COMPARISON: Left rib radiographs dated 2024.  FINDINGS: Mild right basilar atelectasis.  Lungs are otherwise clear. The heart size is normal. Cardiac loop recorder overlying the left lower chest. There is no pleural effusion. There is no pneumothorax. Right shoulder reverse arthroplasty.    1.  Mild right basilar atelectasis. 2.  Right shoulder reverse arthroplasty. 3.  Cardiac loop recorder. 4.  Otherwise unremarkable chest radiograph.    ______________________________________ Electronically signed by: MYRIAM KEE M.D. Date:     2024 Time:    11:59       Pertinent Labs:   CBC:   Recent Labs     24  1115 25  0037 25  0840   WBC 13.3* 9.5 8.6   HGB 12.6 9.4* 12.7   * 287 320     BMP:    Recent Labs     24  1115 25  0037 25  0134    140 141   K 3.8 3.7 3.5    107 107   CO2 21* 22 18*   BUN 16 16 17   CREATININE 0.8 0.6 0.8   GLUCOSE 172* 230* 108*     INR: No results for input(s): \"INR\" in the last 72 hours.  Lipids: No results for input(s): \"CHOL\", \"HDL\" in the last 72 hours.    Invalid input(s):

## 2025-01-02 NOTE — PROGRESS NOTES
Awaiting callback from Dr Bhandari office to clarify weight bearing status prior to inpatient rehab admission.   Nurse from office to call back.

## 2025-01-02 NOTE — PROGRESS NOTES
01/02/25 0900   Subjective   Subjective Patient in bed agrees to  TR. > chair.   Pain C/O pain in buttocks does not rate.   Cognition   Overall Cognitive Status WFL   Orientation   Overall Orientation Status WFL   Vitals   O2 Device None (Room air)   Bed Mobility Training   Bed Mobility Training Yes   Supine to Sit Stand-by assistance  (Use of bed functions.)   Balance   Sitting Intact   Standing With support   Transfer Training   Transfer Training Yes   Sit to Stand Minimum assistance   Stand to Sit Minimum assistance   Gait Training   Gait Training Yes   Gait   Overall Level of Assistance Minimum assistance  (Cues to maintain WB status)   Distance (ft) 4 Feet  (To chair 50% WB R LE)   Assistive Device Walker, rolling   PT Exercises   A/AROM Exercises BL LE EX X 20 reps sitting EOB   Patient Education   Education Given To Patient   Education Provided Role of Therapy;Plan of Care;Fall Prevention Strategies;Transfer Training   Education Provided Comments Use of call light, staff A   Education Method Demonstration;Verbal   Barriers to Learning None   Education Outcome Verbalized understanding;Demonstrated understanding;Continued education needed   Other Specialty Interventions   Other Treatments/Modalities In recliner all needs in reach.  Personal alarm attached.   PT Plan of Care   Thursday X       Electronically signed by Thiago Eduardo PTA on 1/2/2025 at 9:48 AM

## 2025-01-03 ENCOUNTER — APPOINTMENT (OUTPATIENT)
Dept: CT IMAGING | Age: 75
End: 2025-01-03
Attending: PSYCHIATRY & NEUROLOGY
Payer: MEDICARE

## 2025-01-03 LAB
GLUCOSE BLD-MCNC: 130 MG/DL (ref 70–99)
GLUCOSE BLD-MCNC: 57 MG/DL (ref 70–99)
GLUCOSE BLD-MCNC: 61 MG/DL (ref 70–99)
GLUCOSE BLD-MCNC: 71 MG/DL (ref 70–99)
GLUCOSE BLD-MCNC: 80 MG/DL (ref 70–99)
GLUCOSE BLD-MCNC: 85 MG/DL (ref 70–99)
PERFORMED ON: ABNORMAL
PERFORMED ON: NORMAL
PREALB SERPL-MCNC: 8 MG/DL (ref 20–40)

## 2025-01-03 PROCEDURE — 97535 SELF CARE MNGMENT TRAINING: CPT

## 2025-01-03 PROCEDURE — 97165 OT EVAL LOW COMPLEX 30 MIN: CPT

## 2025-01-03 PROCEDURE — 6360000002 HC RX W HCPCS: Performed by: NURSE PRACTITIONER

## 2025-01-03 PROCEDURE — 97530 THERAPEUTIC ACTIVITIES: CPT

## 2025-01-03 PROCEDURE — 1180000000 HC REHAB R&B

## 2025-01-03 PROCEDURE — 82962 GLUCOSE BLOOD TEST: CPT

## 2025-01-03 PROCEDURE — 84134 ASSAY OF PREALBUMIN: CPT

## 2025-01-03 PROCEDURE — 97116 GAIT TRAINING THERAPY: CPT

## 2025-01-03 PROCEDURE — 97162 PT EVAL MOD COMPLEX 30 MIN: CPT

## 2025-01-03 PROCEDURE — 97110 THERAPEUTIC EXERCISES: CPT

## 2025-01-03 PROCEDURE — 99222 1ST HOSP IP/OBS MODERATE 55: CPT | Performed by: PSYCHIATRY & NEUROLOGY

## 2025-01-03 PROCEDURE — 2580000003 HC RX 258: Performed by: NURSE PRACTITIONER

## 2025-01-03 PROCEDURE — 6370000000 HC RX 637 (ALT 250 FOR IP): Performed by: PSYCHIATRY & NEUROLOGY

## 2025-01-03 PROCEDURE — 36415 COLL VENOUS BLD VENIPUNCTURE: CPT

## 2025-01-03 PROCEDURE — 6370000000 HC RX 637 (ALT 250 FOR IP): Performed by: NURSE PRACTITIONER

## 2025-01-03 PROCEDURE — 71250 CT THORAX DX C-: CPT

## 2025-01-03 RX ORDER — DEXTROSE MONOHYDRATE 100 MG/ML
INJECTION, SOLUTION INTRAVENOUS CONTINUOUS PRN
Status: DISCONTINUED | OUTPATIENT
Start: 2025-01-03 | End: 2025-01-17 | Stop reason: HOSPADM

## 2025-01-03 RX ADMIN — LOPERAMIDE HYDROCHLORIDE 2 MG: 2 CAPSULE ORAL at 20:27

## 2025-01-03 RX ADMIN — PANCRELIPASE LIPASE, PANCRELIPASE PROTEASE, PANCRELIPASE AMYLASE 5000 UNITS: 5000; 17000; 24000 CAPSULE, DELAYED RELEASE ORAL at 17:19

## 2025-01-03 RX ADMIN — ACETAMINOPHEN 650 MG: 325 TABLET ORAL at 14:24

## 2025-01-03 RX ADMIN — CEFEPIME 2000 MG: 2 INJECTION, POWDER, FOR SOLUTION INTRAVENOUS at 17:24

## 2025-01-03 RX ADMIN — APIXABAN 5 MG: 5 TABLET, FILM COATED ORAL at 08:41

## 2025-01-03 RX ADMIN — PANTOPRAZOLE SODIUM 40 MG: 40 TABLET, DELAYED RELEASE ORAL at 08:42

## 2025-01-03 RX ADMIN — APIXABAN 5 MG: 5 TABLET, FILM COATED ORAL at 20:27

## 2025-01-03 RX ADMIN — LEVOTHYROXINE SODIUM 100 MCG: 0.05 TABLET ORAL at 05:44

## 2025-01-03 RX ADMIN — CEFEPIME 2000 MG: 2 INJECTION, POWDER, FOR SOLUTION INTRAVENOUS at 05:50

## 2025-01-03 RX ADMIN — THERA TABS 1 TABLET: TAB at 08:42

## 2025-01-03 RX ADMIN — ACETAMINOPHEN 650 MG: 325 TABLET ORAL at 20:27

## 2025-01-03 RX ADMIN — PANCRELIPASE LIPASE, PANCRELIPASE PROTEASE, PANCRELIPASE AMYLASE 5000 UNITS: 5000; 17000; 24000 CAPSULE, DELAYED RELEASE ORAL at 08:41

## 2025-01-03 RX ADMIN — MICONAZOLE NITRATE: 2 POWDER TOPICAL at 08:45

## 2025-01-03 RX ADMIN — Medication 400 MG: at 08:41

## 2025-01-03 RX ADMIN — PANCRELIPASE LIPASE, PANCRELIPASE PROTEASE, PANCRELIPASE AMYLASE 5000 UNITS: 5000; 17000; 24000 CAPSULE, DELAYED RELEASE ORAL at 12:12

## 2025-01-03 RX ADMIN — METOPROLOL SUCCINATE 25 MG: 25 TABLET, EXTENDED RELEASE ORAL at 08:42

## 2025-01-03 RX ADMIN — Medication 16 G: at 08:53

## 2025-01-03 RX ADMIN — SPIRONOLACTONE 25 MG: 25 TABLET ORAL at 08:41

## 2025-01-03 RX ADMIN — METOPROLOL SUCCINATE 25 MG: 25 TABLET, EXTENDED RELEASE ORAL at 20:27

## 2025-01-03 ASSESSMENT — PAIN DESCRIPTION - DESCRIPTORS
DESCRIPTORS: ACHING
DESCRIPTORS: ACHING

## 2025-01-03 ASSESSMENT — PAIN - FUNCTIONAL ASSESSMENT: PAIN_FUNCTIONAL_ASSESSMENT: ACTIVITIES ARE NOT PREVENTED

## 2025-01-03 ASSESSMENT — PAIN SCALES - GENERAL
PAINLEVEL_OUTOF10: 3
PAINLEVEL_OUTOF10: 1
PAINLEVEL_OUTOF10: 3
PAINLEVEL_OUTOF10: 6

## 2025-01-03 ASSESSMENT — PAIN DESCRIPTION - LOCATION
LOCATION: HIP
LOCATION: BACK

## 2025-01-03 ASSESSMENT — PAIN DESCRIPTION - ORIENTATION: ORIENTATION: RIGHT

## 2025-01-03 NOTE — PROGRESS NOTES
Occupational Therapy  Facility/Department: [facility] 8 REHAB UNIT  Rehabilitation Occupational Therapy Daily Treatment Note    Date: 1/3/25  Patient Name: Jacqueline Richardson       Room: 0818/818-02  MRN: [id number]  Account: [id number]   : 1950  (74 y.o.) Gender: female        Diagnosis: Sepsis, recent R hip fx with nailing in oct 2024 with PWB precations  Additional Pertinent Hx: DM, HTN, tobacco abuse, hypothyroid, anxiety, arthritis, hip fx s/p nailing 9/15/24, post-op DVT,  UTI 2 weeks ago with sepsis, and acquired diverticulum of bladder    Treatment Diagnosis: Sepsis, recent R hip fx with nailing in oct 2024 with PWB precations   Past Medical History:  has a past medical history of Acquired diverticulum of bladder, Diabetes (HCC), Hypertension, Smoker, and Thyroid disease.  Past Surgical History:   has a past surgical history that includes hip surgery (Right, 09/15/2024); ep device procedure (N/A, 2024); Appendectomy; Cholecystectomy; Hysterectomy; Pancreatectomy; Splenectomy; and Tonsillectomy.    Restrictions  Restrictions/Precautions: Fall Risk, Weight Bearing  Other Position/Activity Restrictions: now WBAT per Dr Bowen per admit sheet  Right Lower Extremity Weight Bearing: Weight Bearing As Tolerated  Left Lower Extremity Weight Bearing: Weight Bearing As Tolerated    Subjective     Restrictions/Precautions: Fall Risk;Weight Bearing          Objective    Eating   Assistance Needed Setup or clean-up assistance   CARE Score 5   Discharge Goal 6   Oral Hygiene   Assistance Needed Setup or clean-up assistance   CARE Score 5   Discharge Goal 6   Shower/Bathe Self   Assistance Needed Partial/moderate assistance   CARE Score 3   Discharge Goal 6   Upper Body Dressing   Assistance Needed Partial/moderate assistance   Comment min A to pull down in back   CARE Score 3   Discharge Goal 6   Lower Body Dressing   Assistance Needed Substantial/maximal assistance   CARE Score 2   Discharge Goal 6   Putting On/Taking

## 2025-01-03 NOTE — PROGRESS NOTES
Comprehensive Nutrition Assessment    Type and Reason for Visit:  Initial, Consult, Positive nutrition screen    Nutrition Recommendations/Plan:   Follow for po intake, labs, acceptance of ONS     Malnutrition Assessment:  Malnutrition Status:  At risk for malnutrition (01/03/25 0842)    Context:  Acute Illness     Findings of the 6 clinical characteristics of malnutrition:  Energy Intake:  Mild decrease in energy intake  Weight Loss:  No weight loss     Body Fat Loss:  No body fat loss     Muscle Mass Loss:  No muscle mass loss    Fluid Accumulation:  Moderate to Severe Extremities   Strength:  Not Performed    Nutrition Assessment:    +NS and consult for wounds.  Pt is receiving a Carb control SANTANA diet.  Starting [place] in [place] lemonade for wound healing.  Obtained few food preferences.  Pt very knowledgable re: Carb Control diet.  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  Dexcom.,  Accuchek's .,  A1c 6.5    +3 pitting BLE edema.,  nonpitting BUE edema Wound Type: Stage I, Multiple, Stage II       Current Nutrition Intake & Therapies:    Average Meal Intake: 51-75%  Average Supplements Intake: Unable to assess  ADULT ORAL NUTRITION SUPPLEMENT; Lunch, Dinner; Wound Healing Oral Supplement  ADULT DIET; Regular; 4 carb choices (60 gm/meal); No Added Salt (3-4 gm); NO- Lasagna, milk, vegetable blends, squasg    Anthropometric Measures:  Height: 165.1 cm (5' 5\")  Ideal Body Weight (IBW): 125 lbs (57 kg)    Admission Body Weight: 61 kg (134 lb 7.7 oz)  Current Body Weight: 61 kg (134 lb 7.7 oz), 107.6 % IBW. Weight Source: Bed scale  Current BMI (kg/m2): 22.4  Usual Body Weight: 55.3 kg (121 lb 14.6 oz) (10/16/2024)  % Weight Change (Calculated): 10.3  Weight Adjustment For: No Adjustment  BMI Categories: Normal Weight (BMI 22.0 to 24.9) age over

## 2025-01-03 NOTE — PROGRESS NOTES
Physician Progress Note      PATIENT:               MORRIS CHRISTIANSON  [facility] #:                  [id number]  :                       1950  ADMIT DATE:       2024 11:07 AM  DISCH DATE:        2025 2:51 PM  RESPONDING  PROVIDER #:        JEAN THAO          QUERY TEXT:    Patient admitted with Acute Urinary tract infection. Documentation reflects   Sepsis in H&P.   If possible, please document in the progress notes and   discharge summary if Sepsis was:    The medical record reflects the following:  Risk Factors: Urinary tract infection; Diabetes  Clinical Indicators:  24: H&P: Sepsis; Acute Urinary tract infection  25: Discharge Summary: SIRS.  Discharge Medications: Bactrim -160mg  24: WBC: 13.1; Lactic acid sepsis: 3.2; Procalcitonin 0.21 ; Heart rate   110-143  24: Blood culture no growth;  24: Urinalysis: Trace leukocytes; 2+ bacteria;  Treatment: laboratory studies; Blood cultures; IV fluid boluses; Vancomycin   IV; Maxipime IV  Options provided:  -- sepsis due to acute urinary tract infection confirmed after study  -- Sepsis ruled out after study  -- Other - I will add my own diagnosis  -- Disagree - Not applicable / Not valid  -- Disagree - Clinically unable to determine / Unknown  -- Refer to Clinical Documentation Reviewer    PROVIDER RESPONSE TEXT:    Sepsis due to acute urinary tract infection confirmed after study.    Query created by: Pamella Lagos on 1/3/2025 9:32 AM      Electronically signed by:  JEAN THAO 1/3/2025 9:35 AM

## 2025-01-03 NOTE — H&P
[facility]   History and Physical        Patient:   Jacqueline Richardson  MR#:    [id number]  Account Number:                   [id number]      Room:    [address] [place], [place] [address]-   YOB: 1950  Date of Progress Note: 1/3/2025  Time of Note                           7:18 AM  Attending Physician:  Matt Nice MD        Admitting diagnosis:Sepsis, unspecified organism    Secondary diagnoses:Type 2 diabetes mellitus without complications,Essential (primary) hypertension,Hypothyroidism, unspecified,Nicotine dependence, cigarettes, uncomplicated,Anxiety disorder, unspecified,Pressure ulcer of sacral region, stage 2    CHIEF COMPLAINT: Sepsis      HISTORY OF PRESENT ILLNESS:   This 74 y.o. female with history of DM, HTN, tobacco abuse, hypothyroid, anxiety, arthritis, hip fx s/p nailing 9/15/24, post-op DVT,  UTI 2 weeks ago with sepsis, and acquired diverticulum of bladder. She presented to [facility] ER on 12/31/24 with c/o three days of weakness, chills and some diarrhea. Work-up in ER Na 139, potassium 3.8, bun 16, creat 0.8, mag 1.5, lactic 3.7 with repeat of 3.2, BNP 1422, troponin 36,and 33, alk phos 384, wbc 13.3, hgb 12.6, hct 38., resp panel neg, urine 2 + bacteria, 1 wbc trace leukocyte.  CXR with right basilar atelectasis. Fluid bolus of 1000 in ED.  Not sepsis bolus due to edema and recent large amount of swelling from last admission . She was admitted to the Hospitalist service for sepsis. She was placed on Maxipime and vancomycin. Patient was also noted to have a stage 2 pressure ulcer to her sacrum. Patient's Lactic has returned to normal. Blood cultures, urine culture and MRSA probe are negative thus far. Changed antibiotics to Cefepime only pending final cultures. Patient remains weak overall. She is participating in both PT/OT. She is felt to need a stay on Rehab to work towards her goal of returning home with her . She is now felt ready to start the Rehab program.    REVIEW OF SYSTEMS:  Constitutional - No  care. I approve admitting this patient for an intensive inpatient rehabilitation program.    Please feel free to call with any questions   [phone] (cell phone)    Dr Matt Nice  Board certified in Neurology  Board Certified in Clinical Neurophysiology  Fellowship Trained in Clinical Neurophysiology      EMR Dragon/transcription disclaimer:Significant part of this  encounter note is electronic transcription/translation of spoken language to printed text. The electronic translation of spoken language may be erroneous, or at times, nonsensical words or phrases may be inadvertently transcribed. Although I have reviewed the note for such errors, some may still exist.     none

## 2025-01-03 NOTE — PLAN OF CARE
Nutrition Problem #1: Increased nutrient needs, Altered nutrition-related lab values  Intervention: Food and/or Nutrient Delivery: Continue Current Diet, Start Oral Nutrition Supplement  Nutritional

## 2025-01-03 NOTE — PROGRESS NOTES
\   01/03/25 1400   Bed mobility   Supine to Sit Maximum assistance;Moderate assistance  (FROM RIGHT SIDLEYING. ASSIST WITH LES AND TRUNK)   Transfers   Sit to Stand Maximum Assistance;Moderate Assistance  (FROM BED, BILAT UES ON RW)   Stand to Sit Moderate Assistance;Minimal Assistance   Ambulation   Device Rolling Walker   Other Apparatus O2  (2L)   Assistance Contact guard assistance   Quality of Gait RECIPROCAL PATTERN. NARROW RICHI   Distance 8 FT OUT AND BACK   More Ambulation? Yes   Ambulation 2   Device 2 Rolling Walker   Other Apparatus 2 O2;Wheelchair follow  (2L)   Assistance 2 Contact guard assistance   Quality of Gait 2 RECIPROCAL PATTERN.  INCREASE FORCE THROUGH UES.   Distance 80 FT   Assessment   Assessment IMPROVED ENDURANCE.  CONTINUES TO EXHIBIT PROXIMAL LE WEAKNESS.  IMPROVED SIT TO STAND WITH VCS FOR SET UP AND PROPER ANTERIOR LEAN.  IMPROVED SIT TO STAND FROM WC WITH BILAT US ON WHEELCHAIR ARM REST.

## 2025-01-03 NOTE — PROGRESS NOTES
Facility/Department: [facility] REHAB UNIT  Occupational Therapy Initial Assessment    Name: Jacqueline Richardson  : 1950  MRN: [id number]  Date of Service: 1/3/2025    Discharge Recommendations:  Home with Home health OT, Home with assist PRN          Past Medical History:  has a past medical history of Acquired diverticulum of bladder, Diabetes (HCC), Hypertension, Smoker, and Thyroid disease.  Past Surgical History:  has a past surgical history that includes hip surgery (Right, 09/15/2024); ep device procedure (N/A, 2024); Appendectomy; Cholecystectomy; Hysterectomy; Pancreatectomy; Splenectomy; and Tonsillectomy.    Treatment Diagnosis: Sepsis, recent R hip fx with nailing in oct 2024 with PWB precations     25 1015   Assessment   Performance deficits / Impairments Decreased functional mobility ;Decreased ADL status;Decreased strength;Decreased balance;Decreased endurance;Decreased high-level IADLs   Assessment The patient would benefit from further skilled therapy to upgrade safety and functional independence prior to discharge home.   Treatment Diagnosis Sepsis, recent R hip fx with nailing in oct 2024 with PWB precations   Prognosis Good   Decision Making Low Complexity   History DM, HTN, tobacco abuse, hypothyroid, anxiety, arthritis, hip fx s/p nailing 9/15/24, post-op DVT,  UTI 2 weeks ago with sepsis, and acquired diverticulum of bladder   Discharge Recommendations Home with Home health OT;Home with assist PRN       Plan   Occupational Therapy Plan  Specific Instructions for Next Treatment: HEP, standing tolerance, AE training  Current Treatment Recommendations: Strengthening, Balance training, Functional mobility training, Endurance training, Equipment evaluation, education, & procurement, Patient/Caregiver education & training, Safety education & training, Self-Care / ADL, Home management training     Restrictions  Restrictions/Precautions  Restrictions/Precautions: Fall Risk, Weight Bearing  Lower

## 2025-01-03 NOTE — THERAPY EVALUATION
Physical Therapy EVALUATION Note  DATE:  1/3/2025  NAME:  Jacqueline Richardson  :  1950  (74 y.o.,female)  MRN:  [id number]    HEIGHT:  Height: 165.1 cm (5' 5\")  WEIGHT:  Weight - Scale: 61 kg (134 lb 8 oz)    PATIENT DIAGNOSIS(ES):    Diagnosis: SEPSIS D/T UTI; STAGE 2 PRESSURE ULCER SACRUM; RECENT RIGHT HIP FX S/P NAILING    Additional Pertinent Hx: DM, HTN, ANXIETY, RECENT HIP NAILING, DVT, UTI  RESTRICTIONS/PRECAUTIONS:    Restrictions/Precautions  Restrictions/Precautions: Fall Risk, Weight Bearing  Position Activity Restriction  Other Position/Activity Restrictions: now WBAT per Dr Bowen per admit sheet  OVERALL  ORIENTATION STATUS:  Overall Orientation Status: Within Functional Limits  PAIN:                       GROSS ASSESSMENT        POSTURE/BALANCE          ACTIVITY TOLERANCE         BED MOBILITY  Bed mobility  Rolling to Left: Minimal assistance, Contact guard assistance (WITH RIAL)  Rolling to Right: Minimal assistance, Contact guard assistance (WITH RAIL)  Supine to Sit: Maximum assistance, Moderate assistance (FROM RIGHT SIDELYING)  Sit to Supine: Maximum assistance, Moderate assistance (TO RIGHT SIDLEING)        TRANSFERS  Transfers  Sit to Stand: Maximum Assistance, Moderate Assistance (FROM BED; LEFT UE ON BED, RIGHT ON RW.  NARROS RICHI. BILAT HIP ADD/INTERNAL ROTATION WHEN TRYING TO STAND)  Stand to Sit: Moderate Assistance, Minimal Assistance (DECREASED ECCENTRIC CONTROL)  Bed to Chair: Moderate assistance, Minimal assistance (STAND PIVOT TO LEFT WITH RW)  Comment: TRANSFER TO LEFT DUE TO RECENT RIGHT HIP FX S/P NAILING       AMBULATION 1     AMBULATION 2     STAIRS     WHEELCHAIR PROPULSION 1  Propulsion 1  Method: JEFFERY AUSTIN  Level of Assistance: Contact guard assistance, Stand by assistance  Description/ Details: SLOW. FATIGUES QUICKLY  Distance: 25 FT  WHEELCHAIR PROPULSION 2       GOALS:  Short Term Goals  Time Frame for Short Term Goals: 2 WEEKS  Short Term Goal 1: BED MOBILITY MIN A WITH  medical condition or safety concerns  CARE Score: 88  Discharge Goal: Not Attempted    Walking 10 Feet on Uneven Surfaces  Reason if not Attempted: Not attempted due to medical condition or safety concerns  CARE Score: 88  Discharge Goal: Not Attempted    1 Step (Curb)  Reason if not Attempted: Not attempted due to medical condition or safety concerns  CARE Score: 88  Discharge Goal: Supervision or touching assistance    4 Steps  Reason if not Attempted: Not attempted due to medical condition or safety concerns  CARE Score: 88  Discharge Goal: Supervision or touching assistance    12 Steps  Reason if not Attempted: Not attempted due to medical condition or safety concerns  CARE Score: 88  Discharge Goal: Not Attempted    Wheel 50 Feet with Two Turns  Reason if not Attempted: Not attempted due to medical condition or safety concerns  CARE Score: 88  Discharge Goal: Not Attempted    Wheel 150 Feet  Reason if not Attempted: Not attempted due to medical condition or safety concerns  CARE Score: 88  Discharge Goal: Not Attempted      LAST TREATMENT TIME  PT Individual Minutes  Time In: 0900  Time Out: 1000  Minutes: 60

## 2025-01-03 NOTE — PLAN OF CARE
Progressing  Flowsheets (Taken 1/3/2025 0857)  Return ADL Status to a Safe Level of Function: Assist and instruct patient to increase activity and self care as tolerated     Problem: Genitourinary - Adult  Goal: Absence of urinary retention  Outcome: Progressing  Flowsheets (Taken 1/3/2025 0857)  Absence of urinary retention: Assess patient’s ability to void and empty bladder     Problem: Skin/Tissue Integrity - Adult  Goal: Skin integrity remains intact  Outcome: Progressing  Flowsheets (Taken 1/3/2025 1003)  Skin Integrity Remains Intact: Monitor for areas of redness and/or skin breakdown     Problem: Metabolic/Fluid and Electrolytes - Adult  Goal: Electrolytes maintained within normal limits  Outcome: Progressing  Flowsheets (Taken 1/3/2025 0857)  Electrolytes maintained within normal limits: Monitor labs and assess patient for signs and symptoms of electrolyte imbalances  Goal: Hemodynamic stability and optimal renal function maintained  Outcome: Progressing  Flowsheets (Taken 1/3/2025 0857)  Hemodynamic stability and optimal renal function maintained: Monitor labs and assess for signs and symptoms of volume excess or deficit  Goal: Glucose maintained within prescribed range  Outcome: Progressing  Flowsheets (Taken 1/3/2025 0857)  Glucose maintained within prescribed range: Monitor blood glucose as ordered     Problem: ABCDS Injury Assessment  Goal: Absence of physical injury  Outcome: Progressing     Problem: Skin/Tissue Integrity  Goal: Absence of new skin breakdown  Description: 1.  Monitor for areas of redness and/or skin breakdown  2.  Assess vascular access sites hourly  3.  Every 4-6 hours minimum:  Change oxygen saturation probe site  4.  Every 4-6 hours:  If on nasal continuous positive airway pressure, respiratory therapy assess nares and determine need for appliance change or resting period.  Outcome: Progressing

## 2025-01-04 LAB
ALBUMIN SERPL-MCNC: 2 G/DL (ref 3.5–5.2)
ALP SERPL-CCNC: 261 U/L (ref 35–104)
ALT SERPL-CCNC: 17 U/L (ref 5–33)
ANION GAP SERPL CALCULATED.3IONS-SCNC: 6 MMOL/L (ref 7–19)
AST SERPL-CCNC: 25 U/L (ref 5–32)
BACTERIA URNS QL MICRO: NEGATIVE /HPF
BILIRUB SERPL-MCNC: 1 MG/DL (ref 0.2–1.2)
BILIRUB UR QL STRIP: NEGATIVE
BUN SERPL-MCNC: 24 MG/DL (ref 8–23)
CALCIUM SERPL-MCNC: 7 MG/DL (ref 8.8–10.2)
CHLORIDE SERPL-SCNC: 108 MMOL/L (ref 98–111)
CLARITY UR: CLEAR
CO2 SERPL-SCNC: 26 MMOL/L (ref 22–29)
COLOR UR: ABNORMAL
CREAT SERPL-MCNC: 0.7 MG/DL (ref 0.5–0.9)
CRYSTALS URNS MICRO: NORMAL /HPF
EPI CELLS #/AREA URNS AUTO: 2 /HPF (ref 0–5)
ERYTHROCYTE [DISTWIDTH] IN BLOOD BY AUTOMATED COUNT: 15.6 % (ref 11.5–14.5)
GLUCOSE BLD-MCNC: 185 MG/DL (ref 70–99)
GLUCOSE BLD-MCNC: 214 MG/DL (ref 70–99)
GLUCOSE BLD-MCNC: 283 MG/DL (ref 70–99)
GLUCOSE BLD-MCNC: 308 MG/DL (ref 70–99)
GLUCOSE SERPL-MCNC: 191 MG/DL (ref 70–99)
GLUCOSE UR STRIP.AUTO-MCNC: NEGATIVE MG/DL
HCT VFR BLD AUTO: 33.8 % (ref 37–47)
HGB BLD-MCNC: 10.6 G/DL (ref 12–16)
HGB UR STRIP.AUTO-MCNC: NEGATIVE MG/L
HYALINE CASTS #/AREA URNS AUTO: 3 /HPF (ref 0–8)
KETONES UR STRIP.AUTO-MCNC: 15 MG/DL
LEUKOCYTE ESTERASE UR QL STRIP.AUTO: NEGATIVE
MCH RBC QN AUTO: 34.8 PG (ref 27–31)
MCHC RBC AUTO-ENTMCNC: 31.4 G/DL (ref 33–37)
MCV RBC AUTO: 110.8 FL (ref 81–99)
NITRITE UR QL STRIP.AUTO: NEGATIVE
PERFORMED ON: ABNORMAL
PH UR STRIP.AUTO: 6 [PH] (ref 5–8)
PLATELET # BLD AUTO: 257 K/UL (ref 130–400)
PMV BLD AUTO: 12.3 FL (ref 9.4–12.3)
POTASSIUM SERPL-SCNC: 4.4 MMOL/L (ref 3.5–5)
PREALB SERPL-MCNC: 7 MG/DL (ref 20–40)
PROT SERPL-MCNC: 3.8 G/DL (ref 6.4–8.3)
PROT UR STRIP.AUTO-MCNC: 30 MG/DL
RBC # BLD AUTO: 3.05 M/UL (ref 4.2–5.4)
RBC #/AREA URNS AUTO: 1 /HPF (ref 0–4)
SODIUM SERPL-SCNC: 140 MMOL/L (ref 136–145)
SP GR UR STRIP.AUTO: 1.03 (ref 1–1.03)
UROBILINOGEN UR STRIP.AUTO-MCNC: 0.2 E.U./DL
WBC # BLD AUTO: 7.5 K/UL (ref 4.8–10.8)
WBC #/AREA URNS AUTO: 1 /HPF (ref 0–5)

## 2025-01-04 PROCEDURE — 80053 COMPREHEN METABOLIC PANEL: CPT

## 2025-01-04 PROCEDURE — 81001 URINALYSIS AUTO W/SCOPE: CPT

## 2025-01-04 PROCEDURE — 2580000003 HC RX 258: Performed by: NURSE PRACTITIONER

## 2025-01-04 PROCEDURE — 84134 ASSAY OF PREALBUMIN: CPT

## 2025-01-04 PROCEDURE — 36415 COLL VENOUS BLD VENIPUNCTURE: CPT

## 2025-01-04 PROCEDURE — 82962 GLUCOSE BLOOD TEST: CPT

## 2025-01-04 PROCEDURE — 6370000000 HC RX 637 (ALT 250 FOR IP): Performed by: PSYCHIATRY & NEUROLOGY

## 2025-01-04 PROCEDURE — 85027 COMPLETE CBC AUTOMATED: CPT

## 2025-01-04 PROCEDURE — 6370000000 HC RX 637 (ALT 250 FOR IP): Performed by: NURSE PRACTITIONER

## 2025-01-04 PROCEDURE — 6360000002 HC RX W HCPCS: Performed by: NURSE PRACTITIONER

## 2025-01-04 PROCEDURE — 1180000000 HC REHAB R&B

## 2025-01-04 PROCEDURE — 99232 SBSQ HOSP IP/OBS MODERATE 35: CPT | Performed by: PSYCHIATRY & NEUROLOGY

## 2025-01-04 RX ORDER — METOPROLOL SUCCINATE 25 MG/1
25 TABLET, EXTENDED RELEASE ORAL 2 TIMES DAILY
Status: DISCONTINUED | OUTPATIENT
Start: 2025-01-04 | End: 2025-01-08

## 2025-01-04 RX ORDER — SPIRONOLACTONE 25 MG/1
25 TABLET ORAL DAILY
Status: DISCONTINUED | OUTPATIENT
Start: 2025-01-04 | End: 2025-01-06

## 2025-01-04 RX ORDER — SULFAMETHOXAZOLE AND TRIMETHOPRIM 800; 160 MG/1; MG/1
1 TABLET ORAL EVERY 12 HOURS SCHEDULED
Status: COMPLETED | OUTPATIENT
Start: 2025-01-04 | End: 2025-01-07

## 2025-01-04 RX ADMIN — PANCRELIPASE LIPASE, PANCRELIPASE PROTEASE, PANCRELIPASE AMYLASE 5000 UNITS: 5000; 17000; 24000 CAPSULE, DELAYED RELEASE ORAL at 07:34

## 2025-01-04 RX ADMIN — LOPERAMIDE HYDROCHLORIDE 2 MG: 2 CAPSULE ORAL at 05:41

## 2025-01-04 RX ADMIN — PANCRELIPASE LIPASE, PANCRELIPASE PROTEASE, PANCRELIPASE AMYLASE 5000 UNITS: 5000; 17000; 24000 CAPSULE, DELAYED RELEASE ORAL at 12:13

## 2025-01-04 RX ADMIN — PANCRELIPASE LIPASE, PANCRELIPASE PROTEASE, PANCRELIPASE AMYLASE 5000 UNITS: 5000; 17000; 24000 CAPSULE, DELAYED RELEASE ORAL at 17:19

## 2025-01-04 RX ADMIN — LOPERAMIDE HYDROCHLORIDE 2 MG: 2 CAPSULE ORAL at 20:07

## 2025-01-04 RX ADMIN — APIXABAN 5 MG: 5 TABLET, FILM COATED ORAL at 07:34

## 2025-01-04 RX ADMIN — INSULIN LISPRO 1 UNITS: 100 INJECTION, SOLUTION INTRAVENOUS; SUBCUTANEOUS at 12:13

## 2025-01-04 RX ADMIN — Medication 1 TABLET: at 11:52

## 2025-01-04 RX ADMIN — METOPROLOL SUCCINATE 25 MG: 25 TABLET, EXTENDED RELEASE ORAL at 20:07

## 2025-01-04 RX ADMIN — ACETAMINOPHEN 650 MG: 325 TABLET ORAL at 23:22

## 2025-01-04 RX ADMIN — PANTOPRAZOLE SODIUM 40 MG: 40 TABLET, DELAYED RELEASE ORAL at 07:34

## 2025-01-04 RX ADMIN — Medication 1 TABLET: at 20:07

## 2025-01-04 RX ADMIN — MICONAZOLE NITRATE: 2 POWDER TOPICAL at 07:39

## 2025-01-04 RX ADMIN — Medication 400 MG: at 07:34

## 2025-01-04 RX ADMIN — INSULIN LISPRO 2 UNITS: 100 INJECTION, SOLUTION INTRAVENOUS; SUBCUTANEOUS at 17:19

## 2025-01-04 RX ADMIN — APIXABAN 5 MG: 5 TABLET, FILM COATED ORAL at 20:07

## 2025-01-04 RX ADMIN — THERA TABS 1 TABLET: TAB at 07:34

## 2025-01-04 RX ADMIN — SULFAMETHOXAZOLE AND TRIMETHOPRIM 1 TABLET: 800; 160 TABLET ORAL at 20:07

## 2025-01-04 RX ADMIN — INSULIN LISPRO 4 UNITS: 100 INJECTION, SOLUTION INTRAVENOUS; SUBCUTANEOUS at 20:07

## 2025-01-04 RX ADMIN — LEVOTHYROXINE SODIUM 100 MCG: 0.05 TABLET ORAL at 05:41

## 2025-01-04 RX ADMIN — CEFEPIME 2000 MG: 2 INJECTION, POWDER, FOR SOLUTION INTRAVENOUS at 05:46

## 2025-01-04 RX ADMIN — ACETAMINOPHEN 650 MG: 325 TABLET ORAL at 07:31

## 2025-01-04 ASSESSMENT — PAIN SCALES - GENERAL
PAINLEVEL_OUTOF10: 3
PAINLEVEL_OUTOF10: 0
PAINLEVEL_OUTOF10: 0

## 2025-01-04 ASSESSMENT — PAIN DESCRIPTION - DESCRIPTORS: DESCRIPTORS: ACHING

## 2025-01-04 ASSESSMENT — PAIN - FUNCTIONAL ASSESSMENT: PAIN_FUNCTIONAL_ASSESSMENT: ACTIVITIES ARE NOT PREVENTED

## 2025-01-04 ASSESSMENT — PAIN DESCRIPTION - ORIENTATION: ORIENTATION: MID

## 2025-01-04 ASSESSMENT — PAIN DESCRIPTION - LOCATION: LOCATION: BACK

## 2025-01-04 NOTE — PROGRESS NOTES
Patient:   Jacqueline Richardson  MR#:    [id number]   Room:    [facility]-   YOB: 1950  Date of Progress Note: 1/4/2025  Time of Note                           8:27 AM  Consulting Physician:   Matt Nice M.D.  Attending Physician:  Matt Nice MD     Chief complaint Sepsis    S:This 74 y.o. female  with history of DM, HTN, tobacco abuse, hypothyroid, anxiety, arthritis, hip fx s/p nailing 9/15/24, post-op DVT, UTI 2 weeks ago with sepsis, and acquired diverticulum of bladder. She presented to [facility] ER on 12/31/24 with c/o three days of weakness, chills and some diarrhea. Work-up in ER Na 139, potassium 3.8, bun 16, creat 0.8, mag 1.5, lactic 3.7 with repeat of 3.2, BNP 1422, troponin 36,and 33, alk phos 384, wbc 13.3, hgb 12.6, hct 38., resp panel neg, urine 2 + bacteria, 1 wbc trace leukocyte. CXR with right basilar atelectasis. Fluid bolus of 1000 in ED. Not sepsis bolus due to edema and recent large amount of swelling from last admission . She was admitted to the Hospitalist service for sepsis. She was placed on Maxipime and vancomycin. Patient was also noted to have a stage 2 pressure ulcer to her sacrum. Patient's Lactic has returned to normal. Blood cultures, urine culture and MRSA probe are negative thus far. Changed antibiotics to Cefepime only pending final cultures. Patient remains weak overall. She is participating in both PT/OT. She is felt to need a stay on Rehab to work towards her goal of returning home with her . She is now felt ready to start the Rehab program.  Some asymptomatic hypoxia yesterday.  No new complaints.      REVIEW OF SYSTEMS:  Constitutional: No fevers No chills  Neck:No stiffness  Respiratory: No shortness of breath  Cardiovascular: No chest pain No palpitations  Gastrointestinal: No abdominal pain    Genitourinary: No Dysuria  Neurological: No headache, no confusion    Past Medical History:      Diagnosis Date    Acquired diverticulum of bladder     Diabetes  Urinary incontinence/diverticulum of bladder-monitor  10.  Diarrhea-Imodium as needed  11.  PT/OT    Check CTA of the chest for completeness with hypoxia    Supportive care      Expected duration and frequency therapy: 180 minutes per day, 5 days per week    CALL WITH ANY QUESTIONS  [phone] CELL  Dr Matt Nice

## 2025-01-04 NOTE — PLAN OF CARE
Problem: Chronic Conditions and Co-morbidities  Goal: Patient's chronic conditions and co-morbidity symptoms are monitored and maintained or improved  1/4/2025 1019 by Clarita Chisholm LPN  Outcome: Progressing  Flowsheets (Taken 1/4/2025 0754)  Care Plan - Patient's Chronic Conditions and Co-Morbidity Symptoms are Monitored and Maintained or Improved: Monitor and assess patient's chronic conditions and comorbid symptoms for stability, deterioration, or improvement  1/3/2025 2212 by Lindsay Varghese RN  Outcome: Progressing     Problem: Discharge Planning  Goal: Discharge to home or other facility with appropriate resources  1/4/2025 1019 by Clarita Chisholm LPN  Outcome: Progressing  Flowsheets (Taken 1/4/2025 0754)  Discharge to home or other facility with appropriate resources: Refer to discharge planning if patient needs post-hospital services based on physician order or complex needs related to functional status, cognitive ability or social support system  1/3/2025 2212 by Lindsay Varghese RN  Outcome: Progressing     Problem: Safety - Adult  Goal: Free from fall injury  1/4/2025 1019 by Clarita Chisholm LPN  Outcome: Progressing  1/3/2025 2212 by Lindsay Varghese RN  Outcome: Progressing     Problem: Respiratory - Adult  Goal: Achieves optimal ventilation and oxygenation  1/4/2025 1019 by Clarita Chisholm LPN  Outcome: Progressing  Flowsheets (Taken 1/4/2025 0754)  Achieves optimal ventilation and oxygenation: Assess for changes in respiratory status  1/3/2025 2212 by Lindsay Varghese RN  Outcome: Progressing     Problem: Cardiovascular - Adult  Goal: Maintains optimal cardiac output and hemodynamic stability  1/4/2025 1019 by Clarita Chisholm LPN  Outcome: Progressing  Flowsheets (Taken 1/4/2025 0754)  Maintains optimal cardiac output and hemodynamic stability: Monitor blood pressure and heart rate  1/3/2025 2212 by Lindsay Varghese RN  Outcome: Progressing  Goal: Absence  of cardiac dysrhythmias or at baseline  1/3/2025 2212 by Lindsay Varghese, RN  Outcome: Progressing     Problem: Musculoskeletal - Adult  Goal: Return mobility to safest level of function  1/4/2025 1019 by Clarita Chisholm LPN  Outcome: Progressing  Flowsheets (Taken 1/4/2025 0754)  Return Mobility to Safest Level of Function: Assess patient stability and activity tolerance for standing, transferring and ambulating with or without assistive devices  1/3/2025 2212 by Lindsay Varghese RN  Outcome: Progressing  Goal: Maintain proper alignment of affected body part  1/3/2025 2212 by Lindsay Varghese RN  Outcome: Progressing  Goal: Return ADL status to a safe level of function  1/4/2025 1019 by Clarita Chisholm LPN  Outcome: Progressing  Flowsheets (Taken 1/4/2025 0754)  Return ADL Status to a Safe Level of Function: Assist and instruct patient to increase activity and self care as tolerated  1/3/2025 2212 by Lindsay Varghese RN  Outcome: Progressing     Problem: Genitourinary - Adult  Goal: Absence of urinary retention  1/4/2025 1019 by Clarita Chisholm LPN  Outcome: Progressing  Flowsheets (Taken 1/4/2025 0754)  Absence of urinary retention: Assess patient’s ability to void and empty bladder  1/3/2025 2212 by Lindsay Varghese RN  Outcome: Progressing     Problem: Skin/Tissue Integrity - Adult  Goal: Skin integrity remains intact  1/4/2025 1019 by Clarita Chisholm LPN  Outcome: Progressing  Flowsheets (Taken 1/4/2025 0722)  Skin Integrity Remains Intact: Monitor for areas of redness and/or skin breakdown  1/3/2025 2212 by Lindsay Varghese RN  Outcome: Progressing     Problem: Metabolic/Fluid and Electrolytes - Adult  Goal: Electrolytes maintained within normal limits  1/4/2025 1019 by Clarita Chisholm LPN  Outcome: Progressing  Flowsheets (Taken 1/4/2025 0754)  Electrolytes maintained within normal limits: Monitor labs and assess patient for signs and symptoms of electrolyte

## 2025-01-04 NOTE — PLAN OF CARE
Problem: Chronic Conditions and Co-morbidities  Goal: Patient's chronic conditions and co-morbidity symptoms are monitored and maintained or improved  1/3/2025 2212 by Lindsay Varghese RN  Outcome: Progressing  1/3/2025 1028 by Clarita Chisholm LPN  Outcome: Progressing  Flowsheets (Taken 1/3/2025 0857)  Care Plan - Patient's Chronic Conditions and Co-Morbidity Symptoms are Monitored and Maintained or Improved: Monitor and assess patient's chronic conditions and comorbid symptoms for stability, deterioration, or improvement     Problem: Discharge Planning  Goal: Discharge to home or other facility with appropriate resources  1/3/2025 2212 by Lindsay Varghese RN  Outcome: Progressing  1/3/2025 1028 by Clarita Chisholm LPN  Outcome: Progressing  Flowsheets (Taken 1/3/2025 0857)  Discharge to home or other facility with appropriate resources: Refer to discharge planning if patient needs post-hospital services based on physician order or complex needs related to functional status, cognitive ability or social support system     Problem: Safety - Adult  Goal: Free from fall injury  1/3/2025 2212 by Lindsay Varghese RN  Outcome: Progressing  1/3/2025 1028 by Clarita Chisholm LPN  Outcome: Progressing     Problem: Respiratory - Adult  Goal: Achieves optimal ventilation and oxygenation  1/3/2025 2212 by Lindsay Varghese RN  Outcome: Progressing  1/3/2025 1028 by Clarita Chisholm LPN  Outcome: Progressing  Flowsheets (Taken 1/3/2025 0857)  Achieves optimal ventilation and oxygenation: Assess for changes in respiratory status

## 2025-01-05 LAB
ALBUMIN SERPL-MCNC: 2 G/DL (ref 3.5–5.2)
ALP SERPL-CCNC: 267 U/L (ref 35–104)
ALT SERPL-CCNC: 17 U/L (ref 5–33)
ANION GAP SERPL CALCULATED.3IONS-SCNC: 12 MMOL/L (ref 7–19)
AST SERPL-CCNC: 26 U/L (ref 5–32)
BACTERIA BLD CULT ORG #2: NORMAL
BACTERIA BLD CULT: NORMAL
BILIRUB SERPL-MCNC: 0.8 MG/DL (ref 0.2–1.2)
BUN SERPL-MCNC: 33 MG/DL (ref 8–23)
CA-I BLD-SCNC: 1.06 MMOL/L (ref 1.12–1.32)
CALCIUM SERPL-MCNC: 7.2 MG/DL (ref 8.8–10.2)
CHLORIDE SERPL-SCNC: 105 MMOL/L (ref 98–111)
CO2 SERPL-SCNC: 23 MMOL/L (ref 22–29)
CREAT SERPL-MCNC: 0.6 MG/DL (ref 0.5–0.9)
GLUCOSE BLD-MCNC: 179 MG/DL (ref 70–99)
GLUCOSE BLD-MCNC: 213 MG/DL (ref 70–99)
GLUCOSE BLD-MCNC: 291 MG/DL (ref 70–99)
GLUCOSE BLD-MCNC: 302 MG/DL (ref 70–99)
GLUCOSE SERPL-MCNC: 195 MG/DL (ref 70–99)
PERFORMED ON: ABNORMAL
POTASSIUM SERPL-SCNC: 4 MMOL/L (ref 3.5–5)
PROT SERPL-MCNC: 3.7 G/DL (ref 6.4–8.3)
SODIUM SERPL-SCNC: 140 MMOL/L (ref 136–145)

## 2025-01-05 PROCEDURE — 80053 COMPREHEN METABOLIC PANEL: CPT

## 2025-01-05 PROCEDURE — 2700000000 HC OXYGEN THERAPY PER DAY

## 2025-01-05 PROCEDURE — 82330 ASSAY OF CALCIUM: CPT

## 2025-01-05 PROCEDURE — 6370000000 HC RX 637 (ALT 250 FOR IP): Performed by: NURSE PRACTITIONER

## 2025-01-05 PROCEDURE — 94760 N-INVAS EAR/PLS OXIMETRY 1: CPT

## 2025-01-05 PROCEDURE — 6370000000 HC RX 637 (ALT 250 FOR IP): Performed by: PSYCHIATRY & NEUROLOGY

## 2025-01-05 PROCEDURE — 82962 GLUCOSE BLOOD TEST: CPT

## 2025-01-05 PROCEDURE — 36415 COLL VENOUS BLD VENIPUNCTURE: CPT

## 2025-01-05 PROCEDURE — 1180000000 HC REHAB R&B

## 2025-01-05 RX ADMIN — INSULIN LISPRO 2 UNITS: 100 INJECTION, SOLUTION INTRAVENOUS; SUBCUTANEOUS at 16:44

## 2025-01-05 RX ADMIN — APIXABAN 5 MG: 5 TABLET, FILM COATED ORAL at 07:52

## 2025-01-05 RX ADMIN — THERA TABS 1 TABLET: TAB at 07:51

## 2025-01-05 RX ADMIN — SULFAMETHOXAZOLE AND TRIMETHOPRIM 1 TABLET: 800; 160 TABLET ORAL at 07:52

## 2025-01-05 RX ADMIN — Medication 400 MG: at 07:51

## 2025-01-05 RX ADMIN — PANCRELIPASE LIPASE, PANCRELIPASE PROTEASE, PANCRELIPASE AMYLASE 5000 UNITS: 5000; 17000; 24000 CAPSULE, DELAYED RELEASE ORAL at 11:59

## 2025-01-05 RX ADMIN — PANTOPRAZOLE SODIUM 40 MG: 40 TABLET, DELAYED RELEASE ORAL at 07:52

## 2025-01-05 RX ADMIN — Medication 1 TABLET: at 07:51

## 2025-01-05 RX ADMIN — INSULIN LISPRO 2 UNITS: 100 INJECTION, SOLUTION INTRAVENOUS; SUBCUTANEOUS at 20:34

## 2025-01-05 RX ADMIN — Medication 1 TABLET: at 20:34

## 2025-01-05 RX ADMIN — Medication 1 TABLET: at 14:05

## 2025-01-05 RX ADMIN — LEVOTHYROXINE SODIUM 100 MCG: 0.05 TABLET ORAL at 05:24

## 2025-01-05 RX ADMIN — MICONAZOLE NITRATE: 2 POWDER TOPICAL at 07:53

## 2025-01-05 RX ADMIN — SULFAMETHOXAZOLE AND TRIMETHOPRIM 1 TABLET: 800; 160 TABLET ORAL at 20:34

## 2025-01-05 RX ADMIN — MICONAZOLE NITRATE: 2 POWDER TOPICAL at 20:36

## 2025-01-05 RX ADMIN — PANCRELIPASE LIPASE, PANCRELIPASE PROTEASE, PANCRELIPASE AMYLASE 5000 UNITS: 5000; 17000; 24000 CAPSULE, DELAYED RELEASE ORAL at 07:50

## 2025-01-05 RX ADMIN — PANCRELIPASE LIPASE, PANCRELIPASE PROTEASE, PANCRELIPASE AMYLASE 5000 UNITS: 5000; 17000; 24000 CAPSULE, DELAYED RELEASE ORAL at 16:44

## 2025-01-05 RX ADMIN — INSULIN LISPRO 1 UNITS: 100 INJECTION, SOLUTION INTRAVENOUS; SUBCUTANEOUS at 07:50

## 2025-01-05 RX ADMIN — APIXABAN 5 MG: 5 TABLET, FILM COATED ORAL at 20:34

## 2025-01-05 ASSESSMENT — PAIN SCALES - GENERAL: PAINLEVEL_OUTOF10: 0

## 2025-01-05 NOTE — PLAN OF CARE
Problem: Chronic Conditions and Co-morbidities  Goal: Patient's chronic conditions and co-morbidity symptoms are monitored and maintained or improved  1/5/2025 0925 by Clarita Chisholm LPN  Outcome: Progressing  Flowsheets (Taken 1/5/2025 0801)  Care Plan - Patient's Chronic Conditions and Co-Morbidity Symptoms are Monitored and Maintained or Improved: Monitor and assess patient's chronic conditions and comorbid symptoms for stability, deterioration, or improvement  1/4/2025 2214 by Lindsay Varghese RN  Outcome: Progressing     Problem: Discharge Planning  Goal: Discharge to home or other facility with appropriate resources  1/5/2025 0925 by Clarita Chisholm LPN  Outcome: Progressing  Flowsheets (Taken 1/5/2025 0801)  Discharge to home or other facility with appropriate resources: Refer to discharge planning if patient needs post-hospital services based on physician order or complex needs related to functional status, cognitive ability or social support system  1/4/2025 2214 by Lindsay Varghese RN  Outcome: Progressing     Problem: Safety - Adult  Goal: Free from fall injury  1/5/2025 0925 by Clarita Chisholm LPN  Outcome: Progressing  1/4/2025 2214 by Lindsay Varghese RN  Outcome: Progressing     Problem: Respiratory - Adult  Goal: Achieves optimal ventilation and oxygenation  1/5/2025 0925 by Clarita Chisholm LPN  Outcome: Progressing  Flowsheets (Taken 1/5/2025 0801)  Achieves optimal ventilation and oxygenation: Assess for changes in respiratory status  1/4/2025 2214 by Lindsay Varghese RN  Outcome: Progressing     Problem: Cardiovascular - Adult  Goal: Maintains optimal cardiac output and hemodynamic stability  1/5/2025 0925 by Clarita Chisholm LPN  Outcome: Progressing  Flowsheets (Taken 1/5/2025 0801)  Maintains optimal cardiac output and hemodynamic stability: Monitor blood pressure and heart rate  1/4/2025 2214 by Lindsay Varghese RN  Outcome: Progressing  Goal: Absence

## 2025-01-05 NOTE — PLAN OF CARE
Problem: Chronic Conditions and Co-morbidities  Goal: Patient's chronic conditions and co-morbidity symptoms are monitored and maintained or improved  1/4/2025 2214 by Lindsay Varghese RN  Outcome: Progressing  1/4/2025 1019 by Clarita Chisholm LPN  Outcome: Progressing  Flowsheets (Taken 1/4/2025 0754)  Care Plan - Patient's Chronic Conditions and Co-Morbidity Symptoms are Monitored and Maintained or Improved: Monitor and assess patient's chronic conditions and comorbid symptoms for stability, deterioration, or improvement     Problem: Discharge Planning  Goal: Discharge to home or other facility with appropriate resources  1/4/2025 2214 by Lindsay Varghese RN  Outcome: Progressing  1/4/2025 1019 by Clarita Chisholm LPN  Outcome: Progressing  Flowsheets (Taken 1/4/2025 0754)  Discharge to home or other facility with appropriate resources: Refer to discharge planning if patient needs post-hospital services based on physician order or complex needs related to functional status, cognitive ability or social support system     Problem: Safety - Adult  Goal: Free from fall injury  1/4/2025 2214 by Lindsay Varghese RN  Outcome: Progressing  1/4/2025 1019 by Clarita Chisholm LPN  Outcome: Progressing     Problem: Respiratory - Adult  Goal: Achieves optimal ventilation and oxygenation  1/4/2025 2214 by Lindsay Varghese RN  Outcome: Progressing  1/4/2025 1019 by Clarita Chisholm LPN  Outcome: Progressing  Flowsheets (Taken 1/4/2025 0754)  Achieves optimal ventilation and oxygenation: Assess for changes in respiratory status

## 2025-01-06 PROBLEM — E43 SEVERE MALNUTRITION (HCC): Status: ACTIVE | Noted: 2025-01-06

## 2025-01-06 LAB
ALBUMIN SERPL-MCNC: 2.2 G/DL (ref 3.5–5.2)
ALP SERPL-CCNC: 289 U/L (ref 35–104)
ALT SERPL-CCNC: 20 U/L (ref 5–33)
ANION GAP SERPL CALCULATED.3IONS-SCNC: 10 MMOL/L (ref 7–19)
AST SERPL-CCNC: 34 U/L (ref 5–32)
BASOPHILS # BLD: 0.1 K/UL (ref 0–0.2)
BASOPHILS NFR BLD: 1.2 % (ref 0–1)
BILIRUB SERPL-MCNC: 1 MG/DL (ref 0.2–1.2)
BUN SERPL-MCNC: 35 MG/DL (ref 8–23)
CALCIUM SERPL-MCNC: 7.6 MG/DL (ref 8.8–10.2)
CHLORIDE SERPL-SCNC: 108 MMOL/L (ref 98–111)
CO2 SERPL-SCNC: 23 MMOL/L (ref 22–29)
CREAT SERPL-MCNC: 0.7 MG/DL (ref 0.5–0.9)
EOSINOPHIL # BLD: 0.1 K/UL (ref 0–0.6)
EOSINOPHIL NFR BLD: 1.5 % (ref 0–5)
ERYTHROCYTE [DISTWIDTH] IN BLOOD BY AUTOMATED COUNT: 15.9 % (ref 11.5–14.5)
GLUCOSE BLD-MCNC: 189 MG/DL (ref 70–99)
GLUCOSE BLD-MCNC: 194 MG/DL (ref 70–99)
GLUCOSE BLD-MCNC: 208 MG/DL (ref 70–99)
GLUCOSE BLD-MCNC: 259 MG/DL (ref 70–99)
GLUCOSE SERPL-MCNC: 197 MG/DL (ref 70–99)
HCT VFR BLD AUTO: 34.1 % (ref 37–47)
HGB BLD-MCNC: 10.5 G/DL (ref 12–16)
IMM GRANULOCYTES # BLD: 0 K/UL
LYMPHOCYTES # BLD: 2.6 K/UL (ref 1.1–4.5)
LYMPHOCYTES NFR BLD: 27.8 % (ref 20–40)
MCH RBC QN AUTO: 34 PG (ref 27–31)
MCHC RBC AUTO-ENTMCNC: 30.8 G/DL (ref 33–37)
MCV RBC AUTO: 110.4 FL (ref 81–99)
MONOCYTES # BLD: 1 K/UL (ref 0–0.9)
MONOCYTES NFR BLD: 10.5 % (ref 0–10)
NEUTROPHILS # BLD: 5.4 K/UL (ref 1.5–7.5)
NEUTS SEG NFR BLD: 58.6 % (ref 50–65)
PERFORMED ON: ABNORMAL
PLATELET # BLD AUTO: 259 K/UL (ref 130–400)
PMV BLD AUTO: 12.2 FL (ref 9.4–12.3)
POTASSIUM SERPL-SCNC: 4.4 MMOL/L (ref 3.5–5)
PROT SERPL-MCNC: 4.1 G/DL (ref 6.4–8.3)
RBC # BLD AUTO: 3.09 M/UL (ref 4.2–5.4)
SODIUM SERPL-SCNC: 141 MMOL/L (ref 136–145)
WBC # BLD AUTO: 9.2 K/UL (ref 4.8–10.8)

## 2025-01-06 PROCEDURE — 6370000000 HC RX 637 (ALT 250 FOR IP): Performed by: PSYCHIATRY & NEUROLOGY

## 2025-01-06 PROCEDURE — 82962 GLUCOSE BLOOD TEST: CPT

## 2025-01-06 PROCEDURE — 97110 THERAPEUTIC EXERCISES: CPT

## 2025-01-06 PROCEDURE — 2700000000 HC OXYGEN THERAPY PER DAY

## 2025-01-06 PROCEDURE — 6370000000 HC RX 637 (ALT 250 FOR IP): Performed by: NURSE PRACTITIONER

## 2025-01-06 PROCEDURE — 97116 GAIT TRAINING THERAPY: CPT

## 2025-01-06 PROCEDURE — 36415 COLL VENOUS BLD VENIPUNCTURE: CPT

## 2025-01-06 PROCEDURE — 99232 SBSQ HOSP IP/OBS MODERATE 35: CPT | Performed by: PSYCHIATRY & NEUROLOGY

## 2025-01-06 PROCEDURE — 80053 COMPREHEN METABOLIC PANEL: CPT

## 2025-01-06 PROCEDURE — 85025 COMPLETE CBC W/AUTO DIFF WBC: CPT

## 2025-01-06 PROCEDURE — 94760 N-INVAS EAR/PLS OXIMETRY 1: CPT

## 2025-01-06 PROCEDURE — 97530 THERAPEUTIC ACTIVITIES: CPT

## 2025-01-06 PROCEDURE — 1180000000 HC REHAB R&B

## 2025-01-06 RX ORDER — MIDODRINE HYDROCHLORIDE 5 MG/1
5 TABLET ORAL
Status: DISCONTINUED | OUTPATIENT
Start: 2025-01-06 | End: 2025-01-10

## 2025-01-06 RX ORDER — INSULIN GLARGINE 100 [IU]/ML
2 INJECTION, SOLUTION SUBCUTANEOUS 2 TIMES DAILY
Status: DISCONTINUED | OUTPATIENT
Start: 2025-01-06 | End: 2025-01-15

## 2025-01-06 RX ADMIN — Medication 1 TABLET: at 21:04

## 2025-01-06 RX ADMIN — Medication 1 TABLET: at 08:37

## 2025-01-06 RX ADMIN — PANCRELIPASE LIPASE, PANCRELIPASE PROTEASE, PANCRELIPASE AMYLASE 5000 UNITS: 5000; 17000; 24000 CAPSULE, DELAYED RELEASE ORAL at 08:37

## 2025-01-06 RX ADMIN — MICONAZOLE NITRATE: 2 POWDER TOPICAL at 21:06

## 2025-01-06 RX ADMIN — Medication 1 TABLET: at 14:08

## 2025-01-06 RX ADMIN — APIXABAN 5 MG: 5 TABLET, FILM COATED ORAL at 08:37

## 2025-01-06 RX ADMIN — INSULIN GLARGINE 2 UNITS: 100 INJECTION, SOLUTION SUBCUTANEOUS at 08:41

## 2025-01-06 RX ADMIN — SULFAMETHOXAZOLE AND TRIMETHOPRIM 1 TABLET: 800; 160 TABLET ORAL at 21:04

## 2025-01-06 RX ADMIN — THERA TABS 1 TABLET: TAB at 08:37

## 2025-01-06 RX ADMIN — SULFAMETHOXAZOLE AND TRIMETHOPRIM 1 TABLET: 800; 160 TABLET ORAL at 08:36

## 2025-01-06 RX ADMIN — INSULIN LISPRO 2 UNITS: 100 INJECTION, SOLUTION INTRAVENOUS; SUBCUTANEOUS at 21:12

## 2025-01-06 RX ADMIN — PANCRELIPASE LIPASE, PANCRELIPASE PROTEASE, PANCRELIPASE AMYLASE 5000 UNITS: 5000; 17000; 24000 CAPSULE, DELAYED RELEASE ORAL at 16:44

## 2025-01-06 RX ADMIN — MIDODRINE HYDROCHLORIDE 5 MG: 5 TABLET ORAL at 12:39

## 2025-01-06 RX ADMIN — MICONAZOLE NITRATE: 2 POWDER TOPICAL at 08:41

## 2025-01-06 RX ADMIN — Medication 400 MG: at 08:36

## 2025-01-06 RX ADMIN — LEVOTHYROXINE SODIUM 100 MCG: 0.05 TABLET ORAL at 05:02

## 2025-01-06 RX ADMIN — MIDODRINE HYDROCHLORIDE 5 MG: 5 TABLET ORAL at 08:36

## 2025-01-06 RX ADMIN — PANTOPRAZOLE SODIUM 40 MG: 40 TABLET, DELAYED RELEASE ORAL at 08:36

## 2025-01-06 RX ADMIN — INSULIN GLARGINE 2 UNITS: 100 INJECTION, SOLUTION SUBCUTANEOUS at 21:05

## 2025-01-06 RX ADMIN — APIXABAN 5 MG: 5 TABLET, FILM COATED ORAL at 21:05

## 2025-01-06 RX ADMIN — ERGOCALCIFEROL 50000 UNITS: 1.25 CAPSULE ORAL at 08:36

## 2025-01-06 RX ADMIN — MIDODRINE HYDROCHLORIDE 5 MG: 5 TABLET ORAL at 16:44

## 2025-01-06 RX ADMIN — METOPROLOL SUCCINATE 25 MG: 25 TABLET, EXTENDED RELEASE ORAL at 21:04

## 2025-01-06 NOTE — PROGRESS NOTES
Occupational Therapy  Facility/Department: [facility] REHAB UNIT  Rehabilitation Occupational Therapy Daily Treatment Note    Date: 25  Patient Name: Jacqueline Richardson       Room: 0818/818-02  MRN: [id number]  Account: [id number]   : 1950  (74 y.o.) Gender: female                    Past Medical History:  has a past medical history of Acquired diverticulum of bladder, Diabetes (HCC), Hypertension, Smoker, and Thyroid disease.  Past Surgical History:   has a past surgical history that includes hip surgery (Right, 09/15/2024); ep device procedure (N/A, 2024); Appendectomy; Cholecystectomy; Hysterectomy; Pancreatectomy; Splenectomy; and Tonsillectomy.    Restrictions  Restrictions/Precautions: Fall Risk, Weight Bearing  Other Position/Activity Restrictions: now WBAT per Dr Bowen per admit sheet  Right Lower Extremity Weight Bearing: Weight Bearing As Tolerated  Left Lower Extremity Weight Bearing: Weight Bearing As Tolerated    Subjective      25 1105   Pain   Pre-Pain 0   Post-Pain 0      25 1105   General   Family / Caregiver Present No   Balance   Sitting Balance Supervision   Standing Balance Contact guard assistance   Functional Mobility   Functional - Mobility Device Rolling Walker   Activity Other   Assist Level Minimal assistance   Transfers   Stand Step Transfers Minimal assistance   Sit to stand Minimal assistance   Stand to sit Contact guard assistance   Assessment   Performance deficits / Impairments Decreased functional mobility ;Decreased ADL status;Decreased strength;Decreased balance;Decreased endurance;Decreased high-level IADLs   Occupational Therapy Plan   Specific Instructions for Next Treatment standing tolerance   Current Treatment Recommendations Strengthening;Balance training;Functional mobility training;Endurance training;Equipment evaluation, education, & procurement;Patient/Caregiver education & training;Safety education & training;Self-Care / ADL;Home management training

## 2025-01-06 NOTE — PROGRESS NOTES
Comprehensive Nutrition Assessment    Type and Reason for Visit:  Reassess    Nutrition Recommendations/Plan:   Continue current nutritional interventions     Malnutrition Assessment:  Malnutrition Status:  Severe malnutrition (01/06/25 1335)    Context:  Acute Illness     Findings of the 6 clinical characteristics of malnutrition:  Energy Intake:  Mild decrease in energy intake  Weight Loss:  Greater than 2% over 1 week     Body Fat Loss:  No body fat loss     Muscle Mass Loss:  No muscle mass loss    Fluid Accumulation:  Moderate to Severe Extremities   Strength:  Not Performed    Nutrition Assessment:    Pt continues nto receive a 4 Carb Control SANTANA diet.  PO intake is variable with intake ranging 0-25% and 50-75%.  Glucose and Accuchek's still elevated.  Receiving [place] in [place] lemonade to aid in wound healing.  Weight has decreased 6# or 4.5% in the past week--this is a significant loss.    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 Dexcom., Accuchek's 179-302., A1c 6.5 +3 pitting BLE edema., nonpitting BUE edema Wound Type: Stage I, Multiple, Stage II       Current Nutrition Intake & Therapies:    Average Meal Intake: 1-25%, 51-75%  Average Supplements Intake: 1-25%, 26-50%  ADULT ORAL NUTRITION SUPPLEMENT; Lunch, Dinner; Wound Healing Oral Supplement  ADULT DIET; Regular; 4 carb choices (60 gm/meal); No Added Salt (3-4 gm); NO- Lasagna, milk, vegetable blends, squasg    Anthropometric Measures:  Height: 165.1 cm (5' 5\")  Ideal Body Weight (IBW): 125 lbs (57 kg)    Admission Body Weight: 61 kg (134 lb 7.7 oz)  Current Body Weight: 58.1 kg (128 lb 1.4 oz), 102.5 % IBW. Weight Source: Bed scale  Current BMI (kg/m2): 21.3  Usual Body Weight: 55.3 kg (121 lb 14.6 oz) (10/16/2024)  % Weight Change (Calculated): 10.3  Weight Adjustment For: No Adjustment  BMI Categories: Underweight (BMI less than 22) age over 65    Estimated Daily Nutrient Needs:  Energy Requirements Based On: Kcal/kg  Weight Used for Energy  Requirements: Current  Energy (kcal/day): 1743-2033 kcals (30-35 kcals/kg)  Weight Used for Protein Requirements: Current  Protein (g/day): 87g  Method Used for Fluid Requirements: 1 ml/kcal  Fluid (ml/day): 2850-1486 ml    Nutrition Diagnosis:   Increased nutrient needs, Altered nutrition-related lab values related to increase demand for energy/nutrients, endocrine dysfunction (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) as evidenced by wounds, intake 0-25%, intake 51-75%    Nutrition Interventions:   Food and/or Nutrient Delivery: Continue Current Diet, Continue Oral Nutrition Supplement  Nutrition Education/Counseling: No recommendation at this time  Coordination of Nutrition Care: Continue to monitor while inpatient  Plan of Care discussed with: pt    Goals:  Goals: Meet at least 75% of estimated needs, PO intake 50% or greater  Type of Goal: Continue current goal  Previous Goal Met: Progressing toward Goal(s)    Nutrition Monitoring and Evaluation:   Behavioral-Environmental Outcomes: None Identified  Food/Nutrient Intake Outcomes: Food and Nutrient Intake, Supplement Intake  Physical Signs/Symptoms Outcomes: Biochemical Data, Fluid Status or Edema, Weight, Skin    Discharge Planning:    Continue current diet     Reema Alan, MS, RD, LD  Contact: [phone]

## 2025-01-06 NOTE — PROGRESS NOTES
01/06/25 1400   Bed mobility   Sit to Supine Minimal assistance  (triplanar from right, assist with les)   Transfers   Sit to Stand Moderate Assistance;Minimal Assistance  (wc to rw)   Stand to Sit Minimal Assistance;Contact guard assistance   Bed to Chair Minimal assistance;Contact guard assistance  (stand pivot to right with rw)   PT Exercises   Exercise Treatment SUPINE ANKLE PUMPS, QUAD SETS, GLUTE SETS, HOOKLYING RESISTED HIP ER, SAQ X 10 EACH BLE   A/AROM Exercises HEEL SLIDES, HIP ABDUCTION X 10 BLE   Assessment   Assessment TOLERATED SESSION WELL, DESPITE C/O FATAIGUE AS WELL AS LOCALIZED MUSCLE SORENESS.  SIGNIFICANT REST BETWEEN SETS.  DURING SUPINE EX C/O LEFT HEEL PAIN/HYPERSENSITIVY TO THE POINT OF NOT WANTING HEEL TO COME IN CONTACT WITH BED.  COMPRESSION SOCK REMOVED AND HEEL EXAMINED.  SLIGHT REDNESS MEDIAL, LATERAL, POSTERIOR SURFACE OF HEEL.  NURSING NOTIFIED.  HEELS FLOATED FOR SKIN PROTECTION.     Electronically signed by Dewey Simpson PT on 1/6/25 at 2:27 PM CST

## 2025-01-06 NOTE — PROGRESS NOTES
Facility/Department: [facility] REHAB UNIT  Occupational Therapy Treatment Note    Name: Jacqueline Richardson  : 1950  MRN: [id number]  Date of Service: 2025    Discharge Recommendations:  Home with Home health OT, Home with assist PRN        Past Medical History:  has a past medical history of Acquired diverticulum of bladder, Diabetes (HCC), Hypertension, Smoker, and Thyroid disease.  Past Surgical History:  has a past surgical history that includes hip surgery (Right, 09/15/2024); ep device procedure (N/A, 2024); Appendectomy; Cholecystectomy; Hysterectomy; Pancreatectomy; Splenectomy; and Tonsillectomy.    Treatment Diagnosis: Sepsis, recent R hip fx with nailing in oct 2024 with PWB precations    Assessment   Performance deficits / Impairments: Decreased functional mobility ;Decreased ADL status;Decreased strength;Decreased balance;Decreased endurance;Decreased high-level IADLs  Treatment Diagnosis: Sepsis, recent R hip fx with nailing in oct 2024 with PWB precations  Activity Tolerance  Activity Tolerance: Patient Tolerated treatment well              Plan   Occupational Therapy Plan  Specific Instructions for Next Treatment: standing tolerance  Current Treatment Recommendations: Strengthening, Balance training, Functional mobility training, Endurance training, Equipment evaluation, education, & procurement, Patient/Caregiver education & training, Safety education & training, Self-Care / ADL, Home management training     Restrictions  Restrictions/Precautions  Restrictions/Precautions: Fall Risk, Weight Bearing  Lower Extremity Weight Bearing Restrictions  Right Lower Extremity Weight Bearing: Weight Bearing As Tolerated  Partial Weight Bearing Percentage Or Pounds: now WBAT per Dr Bowen on admit sheet  Left Lower Extremity Weight Bearing: Weight Bearing As Tolerated  Position Activity Restriction  Other Position/Activity Restrictions: now WBAT per Dr Bowen per admit sheet    Subjective   General  Chart  10       Balance  Sitting Balance: Supervision  Standing Balance: Contact guard assistance     Standing Balance  Time: 2 handed static standing task  Activity: 4 minutes        Putting On/Taking Off Footwear  Assistance Needed: Supervision or touching assistance  Comment: Socks only with leg over knee tech.  CARE Score: 4  Discharge Goal: Independent      Goals  Short Term Goals  Short Term Goal 1: Patient will complete HEP x 5 occasions in order to improve strength and endurance for ADLs  Short Term Goal 2: pt will complete overall toileting with CGA  Short Term Goal 3: pt will complete LB dressing with AE prn with CGA  Short Term Goal 4: pt will complete simple ambulatory home making task with CGA for balance  Short Term Goal 5: pt will complete overall bathing with CGA  Short Term Goal 6: pt will complete 1-2 handed standing level task for 2 mins with SBA for balance  Short Term Goal 7: pt will don/doff UB dressing with setup  Long Term Goals  Time Frame for Long Term Goals : 2 weeks  Long Term Goal 1: pt will complete overall toileting with modified independence  Long Term Goal 2: pt will complete overall dressing with modified independence  Long Term Goal 3: pt will complete overall bathing with modified independence  Long Term Goal 4: pt will complete simple ambulatory home making task with modified independence  Long Term Goal 5: pt will complete HEP with independence  Additional Goals?: Yes     Therapy Time   Individual Concurrent Group Co-treatment   Time In 1305         Time Out 1345         Minutes 40         Timed Code Treatment Minutes: 40 Minutes       Electronically signed by Medina Salazar OT on 1/6/2025 at 2:48 PM

## 2025-01-06 NOTE — PROGRESS NOTES
01/06/25 0900   Vitals   Pulse 85   SpO2 (!) 88 %  (ON 2L, AFTER SUPINE TO SIT. RECOVERED WITHIN 15 SEC TO HIGH 90S)   O2 Device Nasal cannula  (2L)   /79   MAP (Calculated) 90   Bed mobility   Rolling to Left Minimal assistance;Contact guard assistance  (WITH RAIL)   Rolling to Right Minimal assistance;Contact guard assistance  (WITH RAIL)   Supine to Sit Moderate assistance;Minimal assistance  (FROM RIGHT SIDELYING, THERAPIST ASSIST WITH LEGS OFF BED WITH CUES, PHYSICAL ASSIST FOR TRUNK INTO UPRIGHT)   Transfers   Sit to Stand Maximum Assistance;Moderate Assistance  (FROM AIR MATTRESS; RIGHT UE ON BEDRAIL, LEFT UE ON WHEELCHAIR ARM REST)   Stand to Sit Moderate Assistance;Minimal Assistance  (PHYSICAL ASSIST WITH CONTROLLED ECCENTRIC CONTRACTION)   Bed to Chair Moderate assistance;Minimal assistance  (STAND PIVOT TO LEFT WITH RW)   Ambulation   Device Rolling Walker   Other Apparatus O2;Wheelchair follow  (2L)   Assistance Minimal assistance;Contact guard assistance   Quality of Gait RECIPROCAL PATTERN.  LEFT LE EXTERNALLY ROTATED. DECREASED STEP LENGTH RIGHT.   SLIGHT FFP WITH INCREASED UE WEIGHTBEARING.   Distance 40 FT   Comments ONE TURN   PT Exercises   Exercise Treatment SITTING BILAT LE HIP FLEX/EXT/ABD/ADD; KNEE EXT; PF/DF X10 EA WITH MANUAL RESISTANCE TO AAROM DEPENDING ON PERFORMANCE   Assessment   Assessment PATIENT DESATED WITH SUPIN TO SIT ON 2L, POSSIBLY DUE TO PERFORMING VALSALVA MANUEVER.  C/O FATIGUE WITH ACTIVITY. SIGNIFICANT REST BETWEEN BOUTS OF ACTIVITY.  PRESENTED THIS DAY WITH WEEPING EDEMA IN LOWER EXXTREMITIES WHICH WAS PRESENT ON FRIDAY, PLUS THE ADDITION OF WEEPING EDEMA BILAT UE TO THE POINT OF GOWN BEING SATURATED.  POSSIBLY RAISE WALKER ONE NOTCH THIS AFTERNOON TO IMPROVE POSTURE, UE SUPPORT DURING AMBULATION.  CONTINUED PROXIMAL LE WEAKNESS MAKING SIT TO STAND TRANSFERS PARTICULARLY DIFFICULT.

## 2025-01-06 NOTE — PLAN OF CARE
Problem: Chronic Conditions and Co-morbidities  Goal: Patient's chronic conditions and co-morbidity symptoms are monitored and maintained or improved  1/6/2025 0749 by Sulma De Jesus RN  Outcome: Progressing  1/5/2025 2206 by Lindsay Varghese RN  Outcome: Progressing     Problem: Discharge Planning  Goal: Discharge to home or other facility with appropriate resources  1/6/2025 0749 by Sulma De Jesus RN  Outcome: Progressing  1/5/2025 2206 by Lindsay Varghese RN  Outcome: Progressing     Problem: Safety - Adult  Goal: Free from fall injury  1/6/2025 0749 by Sulma De Jesus RN  Outcome: Progressing  1/5/2025 2206 by Lindsay Varghese RN  Outcome: Progressing     Problem: Respiratory - Adult  Goal: Achieves optimal ventilation and oxygenation  1/6/2025 0749 by Sulma De Jesus RN  Outcome: Progressing  1/5/2025 2206 by Lindsay Varghese RN  Outcome: Progressing     Problem: Cardiovascular - Adult  Goal: Maintains optimal cardiac output and hemodynamic stability  1/6/2025 0749 by Sulma De Jesus RN  Outcome: Progressing  1/5/2025 2206 by Lindsay Varghese RN  Outcome: Progressing  Goal: Absence of cardiac dysrhythmias or at baseline  1/6/2025 0749 by Sulma De Jesus RN  Outcome: Progressing  1/5/2025 2206 by Lindsay Varghese RN  Outcome: Progressing     Problem: Musculoskeletal - Adult  Goal: Return mobility to safest level of function  1/6/2025 0749 by Sulma De Jesus RN  Outcome: Progressing  1/5/2025 2206 by Lindsay Varghese RN  Outcome: Progressing  Goal: Maintain proper alignment of affected body part  1/6/2025 0749 by Sulma De Jesus RN  Outcome: Progressing  1/5/2025 2206 by Lindsay Varghese RN  Outcome: Progressing  Goal: Return ADL status to a safe level of function  1/6/2025 0749 by Sulma De Jesus RN  Outcome: Progressing  1/5/2025 2206 by Lindsay Varghese RN  Outcome: Progressing     Problem: Genitourinary -  Adult  Goal: Absence of urinary retention  1/6/2025 0749 by Sulma De Jesus RN  Outcome: Progressing  1/5/2025 2206 by Lindsay Varghese RN  Outcome: Progressing     Problem: ABCDS Injury Assessment  Goal: Absence of physical injury  1/6/2025 0749 by Sulma De Jesus RN  Outcome: Progressing  1/5/2025 2206 by Lindsay Varghese RN  Outcome: Progressing     Problem: Skin/Tissue Integrity  Goal: Absence of new skin breakdown  Description: 1.  Monitor for areas of redness and/or skin breakdown  2.  Assess vascular access sites hourly  3.  Every 4-6 hours minimum:  Change oxygen saturation probe site  4.  Every 4-6 hours:  If on nasal continuous positive airway pressure, respiratory therapy assess nares and determine need for appliance change or resting period.  1/6/2025 0749 by Sulma De Jesus RN  Outcome: Progressing  1/5/2025 2206 by Lindsay Varghese RN  Outcome: Progressing     Problem: Nutrition Deficit:  Goal: Optimize nutritional status  1/6/2025 0749 by Sulma De Jesus RN  Outcome: Progressing  1/5/2025 2206 by Lindsay Varghese RN  Outcome: Progressing     Problem: Skin/Tissue Integrity - Adult  Goal: Skin integrity remains intact  1/6/2025 0749 by Sulma De Jesus RN  Outcome: Progressing  1/5/2025 2206 by Lindsay Varghese RN  Outcome: Progressing     Problem: Metabolic/Fluid and Electrolytes - Adult  Goal: Electrolytes maintained within normal limits  1/6/2025 0749 by Sulma De Jesus RN  Outcome: Progressing  1/5/2025 2206 by Lindsay Varghese RN  Outcome: Progressing  Goal: Hemodynamic stability and optimal renal function maintained  1/6/2025 0749 by Sulma De Jesus RN  Outcome: Progressing  1/5/2025 2206 by Lindsay Varghese RN  Outcome: Progressing  Goal: Glucose maintained within prescribed range  1/6/2025 0749 by Sulma De Jesus RN  Outcome: Progressing  1/5/2025 2206 by Lindsay Varghese RN  Outcome: Progressing     Problem:

## 2025-01-06 NOTE — PLAN OF CARE
Problem: Nutrition Deficit:  Goal: Optimize nutritional status  Recent Flowsheet Documentation  Taken 1/6/2025 1327 by Reema Alan, MS, RD, LD  Nutrient intake appropriate for improving, restoring, or maintaining nutritional needs:   Assess nutritional status and recommend course of action   Monitor oral intake, labs, and treatment plans   Recommend appropriate diets, oral nutritional supplements, and vitamin/mineral supplements  1/6/2025 0749 by Sulma De Jesus, RN  Outcome: Progressing

## 2025-01-06 NOTE — PROGRESS NOTES
Patient:   Jacqueline Richardson  MR#:    [id number]   Room:    [facility]818-   YOB: 1950  Date of Progress Note: 1/6/2025  Time of Note                           8:28 AM  Consulting Physician:   Matt Nice M.D.  Attending Physician:  Matt Nice MD     Chief complaint Sepsis    S:This 74 y.o. female  with history of DM, HTN, tobacco abuse, hypothyroid, anxiety, arthritis, hip fx s/p nailing 9/15/24, post-op DVT, UTI 2 weeks ago with sepsis, and acquired diverticulum of bladder. She presented to [facility] ER on 12/31/24 with c/o three days of weakness, chills and some diarrhea. Work-up in ER Na 139, potassium 3.8, bun 16, creat 0.8, mag 1.5, lactic 3.7 with repeat of 3.2, BNP 1422, troponin 36,and 33, alk phos 384, wbc 13.3, hgb 12.6, hct 38., resp panel neg, urine 2 + bacteria, 1 wbc trace leukocyte. CXR with right basilar atelectasis. Fluid bolus of 1000 in ED. Not sepsis bolus due to edema and recent large amount of swelling from last admission . She was admitted to the Hospitalist service for sepsis. She was placed on Maxipime and vancomycin. Patient was also noted to have a stage 2 pressure ulcer to her sacrum. Patient's Lactic has returned to normal. Blood cultures, urine culture and MRSA probe are negative thus far. Changed antibiotics to Cefepime only pending final cultures. Patient remains weak overall. She is participating in both PT/OT. She is felt to need a stay on Rehab to work towards her goal of returning home with her . She is now felt ready to start the Rehab program.  No new complaints.  Overall feeling about the same.    REVIEW OF SYSTEMS:  Constitutional: No fevers No chills  Neck:No stiffness  Respiratory: No shortness of breath  Cardiovascular: No chest pain No palpitations  Gastrointestinal: No abdominal pain    Genitourinary: No Dysuria  Neurological: No headache, no confusion    Past Medical History:      Diagnosis Date    Acquired diverticulum of bladder     Diabetes (HCC)

## 2025-01-06 NOTE — PATIENT CARE CONFERENCE
[facility] ACUTE INPATIENT REHABILITATION  TEAM CONFERENCE NOTE    Date: 2025  Patient Name: Jacqueline Richardson        MRN: [id number]    : 1950  (74 y.o.)  Gender: female      Diagnosis: SEPSIS D/T UTI; STAGE 2 PRESSURE ULCER SACRUM; RECENT RIGHT HIP FX S/P NAILING      PHYSICAL THERAPY  GROSS ASSESSMENT       BED MOBILITY  Bed mobility  Rolling to Left: Minimal assistance, Contact guard assistance (WITH RIAL)  Rolling to Right: Minimal assistance, Contact guard assistance (WITH RAIL)  Supine to Sit: Maximum assistance, Moderate assistance (FROM RIGHT SIDLEYING. ASSIST WITH LES AND TRUNK)  Sit to Supine: Maximum assistance, Moderate assistance (TO RIGHT SIDLEING)       TRANSFERS  Transfers  Sit to Stand: Maximum Assistance, Moderate Assistance (FROM BED, BILAT UES ON RW)  Stand to Sit: Moderate Assistance, Minimal Assistance  Bed to Chair: Moderate assistance, Minimal assistance (STAND PIVOT TO LEFT WITH RW)  Comment: TRANSFER TO LEFT DUE TO RECENT RIGHT HIP FX S/P NAILING  WHEELCHAIR PROPULSION  Propulsion 1  Method: JEFFERY AUSTIN  Level of Assistance: Contact guard assistance, Stand by assistance  Description/ Details: SLOW. FATIGUES QUICKLY  Distance: 25 FT  AMBULATION  Ambulation  Device: Rolling Walker  Other Apparatus: O2 (2L)  Assistance: Contact guard assistance  Quality of Gait: RECIPROCAL PATTERN. NARROW RICHI  Distance: 8 FT OUT AND BACK  More Ambulation?: Yes  STAIRS     GOALS:  Short Term Goals  Time Frame for Short Term Goals: 2 WEEKS  Short Term Goal 1: BED MOBILITY MIN A WITH RAILS  Short Term Goal 2: TF SURFACE TO SURFACE MIN A WITH A.D.  Short Term Goal 3: AMBULATE 15 FT WITH RW MIN A  Short Term Goal 4: UP/DOWN 4 STEPS 1-2 RAILS MOD A  Short Term Goal 5: CAR TF MOD A WITH A.D.    Long Term Goals  Time Frame for Long Term Goals : 4 WEEKS  Long Term Goal 1: INDEP BED MOB WITH RAILS  Long Term Goal 2: TF SURFACE TO SURFACE CGA WITH A.D.  Long Term Goal 3: AMBULATE 25 FT WITH RW MIN A  Long Term  balance  Short Term Goal 7: pt will don/doff UB dressing with setup    LTGs:  Long Term Goals  Time Frame for Long Term Goals : 2 weeks  Long Term Goal 1: pt will complete overall toileting with modified independence  Long Term Goal 2: pt will complete overall dressing with modified independence  Long Term Goal 3: pt will complete overall bathing with modified independence  Long Term Goal 4: pt will complete simple ambulatory home making task with modified independence  Long Term Goal 5: pt will complete HEP with independence  Additional Goals?: Yes    Assessment:  Performance deficits / Impairments: Decreased functional mobility , Decreased ADL status, Decreased strength, Decreased balance, Decreased endurance, Decreased high-level IADLs                 NUTRITION  Current Wt: Weight - Scale: 58.1 kg (128 lb) / Body mass index is 21.3 kg/m².  Admission Wt: Admission Body Weight: 61 kg (134 lb 7.7 oz)  Oral Diet Orders:    4 Carb Control SANTANA diet  Oral Nutrition Supplement (ONS) Orders:   [place] in [place] lemonade  Please see nutrition note for details.      NURSING    Past Medical History:        Diagnosis Date    Acquired diverticulum of bladder     Diabetes (HCC)     Hypertension     Smoker     Thyroid disease        Vitals:    01/05/25 0747 01/05/25 0750 01/05/25 1755 01/06/25 0445   BP:  90/60 100/67 104/63   Pulse:  68 69 74   Resp:   18 18   Temp:   97.4 °F (36.3 °C) 98.1 °F (36.7 °C)   TempSrc:   Temporal    SpO2: 97%  100% 97%   Weight:       Height:            SpO2: 97 %    via nasal cannula at 2 to 3/L    Wounds/Incisions/Ulcers:  stage 2 to coccyx     Randy Scale Score: 19    Pain: Patient's pain is currently controlled with Tylenol    Consultations/Labs/X-rays: Routine labs MON and THURS, POC Glucose range 130-308    Family Education: Need to make contact with family to initiate education    Fall Risk:  Galo Rosenthal Total Score: 85    Fall in the last week? No    Sleep Concerns: \"No concerns to

## 2025-01-06 NOTE — PLAN OF CARE
Problem: Chronic Conditions and Co-morbidities  Goal: Patient's chronic conditions and co-morbidity symptoms are monitored and maintained or improved  1/5/2025 2206 by Lindsay Varghese RN  Outcome: Progressing  1/5/2025 0925 by Clarita Chisholm LPN  Outcome: Progressing  Flowsheets (Taken 1/5/2025 0801)  Care Plan - Patient's Chronic Conditions and Co-Morbidity Symptoms are Monitored and Maintained or Improved: Monitor and assess patient's chronic conditions and comorbid symptoms for stability, deterioration, or improvement     Problem: Discharge Planning  Goal: Discharge to home or other facility with appropriate resources  1/5/2025 2206 by Lindsay Varghese RN  Outcome: Progressing  1/5/2025 0925 by Clarita Chisholm LPN  Outcome: Progressing  Flowsheets (Taken 1/5/2025 0801)  Discharge to home or other facility with appropriate resources: Refer to discharge planning if patient needs post-hospital services based on physician order or complex needs related to functional status, cognitive ability or social support system     Problem: Safety - Adult  Goal: Free from fall injury  1/5/2025 2206 by Lindsay Varghese RN  Outcome: Progressing  1/5/2025 0925 by Clarita Chisholm LPN  Outcome: Progressing     Problem: Respiratory - Adult  Goal: Achieves optimal ventilation and oxygenation  1/5/2025 2206 by Lindsay Varghese RN  Outcome: Progressing  1/5/2025 0925 by Clarita Chisholm LPN  Outcome: Progressing  Flowsheets (Taken 1/5/2025 0801)  Achieves optimal ventilation and oxygenation: Assess for changes in respiratory status

## 2025-01-07 LAB
GLUCOSE BLD-MCNC: 103 MG/DL (ref 70–99)
GLUCOSE BLD-MCNC: 204 MG/DL (ref 70–99)
GLUCOSE BLD-MCNC: 75 MG/DL (ref 70–99)
GLUCOSE BLD-MCNC: 89 MG/DL (ref 70–99)
PERFORMED ON: ABNORMAL
PERFORMED ON: ABNORMAL
PERFORMED ON: NORMAL
PERFORMED ON: NORMAL

## 2025-01-07 PROCEDURE — 99232 SBSQ HOSP IP/OBS MODERATE 35: CPT | Performed by: PSYCHIATRY & NEUROLOGY

## 2025-01-07 PROCEDURE — 6370000000 HC RX 637 (ALT 250 FOR IP): Performed by: NURSE PRACTITIONER

## 2025-01-07 PROCEDURE — 1180000000 HC REHAB R&B

## 2025-01-07 PROCEDURE — 97535 SELF CARE MNGMENT TRAINING: CPT

## 2025-01-07 PROCEDURE — 97116 GAIT TRAINING THERAPY: CPT

## 2025-01-07 PROCEDURE — 94760 N-INVAS EAR/PLS OXIMETRY 1: CPT

## 2025-01-07 PROCEDURE — 97530 THERAPEUTIC ACTIVITIES: CPT

## 2025-01-07 PROCEDURE — 6370000000 HC RX 637 (ALT 250 FOR IP): Performed by: PSYCHIATRY & NEUROLOGY

## 2025-01-07 PROCEDURE — 2700000000 HC OXYGEN THERAPY PER DAY

## 2025-01-07 PROCEDURE — 82962 GLUCOSE BLOOD TEST: CPT

## 2025-01-07 PROCEDURE — 97110 THERAPEUTIC EXERCISES: CPT

## 2025-01-07 RX ADMIN — THERA TABS 1 TABLET: TAB at 09:12

## 2025-01-07 RX ADMIN — APIXABAN 5 MG: 5 TABLET, FILM COATED ORAL at 09:09

## 2025-01-07 RX ADMIN — MIDODRINE HYDROCHLORIDE 5 MG: 5 TABLET ORAL at 12:44

## 2025-01-07 RX ADMIN — METOPROLOL SUCCINATE 25 MG: 25 TABLET, EXTENDED RELEASE ORAL at 20:57

## 2025-01-07 RX ADMIN — PANTOPRAZOLE SODIUM 40 MG: 40 TABLET, DELAYED RELEASE ORAL at 09:09

## 2025-01-07 RX ADMIN — MIDODRINE HYDROCHLORIDE 5 MG: 5 TABLET ORAL at 09:12

## 2025-01-07 RX ADMIN — INSULIN GLARGINE 2 UNITS: 100 INJECTION, SOLUTION SUBCUTANEOUS at 09:12

## 2025-01-07 RX ADMIN — METOPROLOL SUCCINATE 25 MG: 25 TABLET, EXTENDED RELEASE ORAL at 09:09

## 2025-01-07 RX ADMIN — Medication 1 TABLET: at 09:15

## 2025-01-07 RX ADMIN — Medication 400 MG: at 09:09

## 2025-01-07 RX ADMIN — Medication 1 TABLET: at 22:02

## 2025-01-07 RX ADMIN — MIDODRINE HYDROCHLORIDE 5 MG: 5 TABLET ORAL at 17:28

## 2025-01-07 RX ADMIN — SULFAMETHOXAZOLE AND TRIMETHOPRIM 1 TABLET: 800; 160 TABLET ORAL at 09:12

## 2025-01-07 RX ADMIN — PANCRELIPASE LIPASE, PANCRELIPASE PROTEASE, PANCRELIPASE AMYLASE 5000 UNITS: 5000; 17000; 24000 CAPSULE, DELAYED RELEASE ORAL at 09:09

## 2025-01-07 RX ADMIN — LEVOTHYROXINE SODIUM 100 MCG: 0.05 TABLET ORAL at 05:49

## 2025-01-07 RX ADMIN — APIXABAN 5 MG: 5 TABLET, FILM COATED ORAL at 21:02

## 2025-01-07 RX ADMIN — PANCRELIPASE LIPASE, PANCRELIPASE PROTEASE, PANCRELIPASE AMYLASE 5000 UNITS: 5000; 17000; 24000 CAPSULE, DELAYED RELEASE ORAL at 12:44

## 2025-01-07 RX ADMIN — Medication 1 TABLET: at 14:12

## 2025-01-07 RX ADMIN — SULFAMETHOXAZOLE AND TRIMETHOPRIM 1 TABLET: 800; 160 TABLET ORAL at 20:57

## 2025-01-07 RX ADMIN — PANCRELIPASE LIPASE, PANCRELIPASE PROTEASE, PANCRELIPASE AMYLASE 5000 UNITS: 5000; 17000; 24000 CAPSULE, DELAYED RELEASE ORAL at 17:28

## 2025-01-07 RX ADMIN — MICONAZOLE NITRATE: 2 POWDER TOPICAL at 20:58

## 2025-01-07 RX ADMIN — ACETAMINOPHEN 650 MG: 325 TABLET ORAL at 21:06

## 2025-01-07 ASSESSMENT — PAIN DESCRIPTION - LOCATION: LOCATION: BACK

## 2025-01-07 ASSESSMENT — PAIN DESCRIPTION - ORIENTATION: ORIENTATION: MID

## 2025-01-07 ASSESSMENT — PAIN SCALES - GENERAL
PAINLEVEL_OUTOF10: 3
PAINLEVEL_OUTOF10: 0

## 2025-01-07 ASSESSMENT — PAIN DESCRIPTION - DESCRIPTORS: DESCRIPTORS: ACHING;THROBBING

## 2025-01-07 NOTE — PLAN OF CARE
Problem: Chronic Conditions and Co-morbidities  Goal: Patient's chronic conditions and co-morbidity symptoms are monitored and maintained or improved  1/7/2025 1044 by Keira Frances RN  Outcome: Progressing  1/6/2025 2222 by Sarahi Issa LPN  Outcome: Progressing     Problem: Discharge Planning  Goal: Discharge to home or other facility with appropriate resources  1/7/2025 1044 by Keira Frances RN  Outcome: Progressing  1/6/2025 2222 by Sarahi Issa LPN  Outcome: Progressing     Problem: Safety - Adult  Goal: Free from fall injury  1/7/2025 1044 by Keira Frances RN  Outcome: Progressing  1/6/2025 2222 by Sarahi Issa LPN  Outcome: Progressing     Problem: Respiratory - Adult  Goal: Achieves optimal ventilation and oxygenation  1/7/2025 1044 by Keira Frances RN  Outcome: Progressing  1/6/2025 2222 by Sarahi Issa LPN  Outcome: Progressing     Problem: Cardiovascular - Adult  Goal: Maintains optimal cardiac output and hemodynamic stability  1/7/2025 1044 by Keira Frances RN  Outcome: Progressing  1/6/2025 2222 by Sarahi Issa LPN  Outcome: Progressing  Goal: Absence of cardiac dysrhythmias or at baseline  1/7/2025 1044 by Keira Frances RN  Outcome: Progressing  1/6/2025 2222 by Sarahi Issa LPN  Outcome: Progressing     Problem: Musculoskeletal - Adult  Goal: Return mobility to safest level of function  1/7/2025 1044 by Keira Frances RN  Outcome: Progressing  1/6/2025 2222 by Sarahi Issa LPN  Outcome: Progressing  Goal: Maintain proper alignment of affected body part  1/7/2025 1044 by Keira Frances RN  Outcome: Progressing  1/6/2025 2222 by Sarahi Issa LPN  Outcome: Progressing  Goal: Return ADL status to a safe level of function  1/7/2025 1044 by Keira Frances RN  Outcome: Progressing  1/6/2025 2222 by Sarahi Issa LPN  Outcome: Progressing     Problem: Genitourinary - Adult  Goal: Absence of urinary retention  1/7/2025 1044 by Keira Frances, RN  Outcome:

## 2025-01-07 NOTE — PROGRESS NOTES
Physical Therapy  Name: Jacqueline Richardson  MRN:  [id number]  Date of service:  1/7/2025 01/07/25 0900   Restrictions/Precautions   Restrictions/Precautions Fall Risk;Weight Bearing   Lower Extremity Weight Bearing Restrictions   Right Lower Extremity Weight Bearing Weight Bearing As Tolerated   Position Activity Restriction   Other Position/Activity Restrictions now WBAT per Dr Bowen per admit sheet   General   Additional Pertinent Hx DM, HTN, ANXIETY, RECENT HIP NAILING, DVT, UTI   Diagnosis SEPSIS D/T UTI; STAGE 2 PRESSURE ULCER SACRUM; RECENT RIGHT HIP FX S/P NAILING   General   General Comments in bed, awake   Subjective   Subjective Cont with weeping \"mainly arms today\". Pt reports back pain.   Pain   Pre-Pain 4   Pain Location Other (Comment);Right;Left  (mid back)   Pain Interventions Rest;Other (Comment);Repositioning  (ex and activity)   Ambulation   Surface Level tile   Device Rolling Walker   Assistance Contact guard assistance;Stand by assistance   Quality of Gait RECIPROCAL PATTERN.  LEFT LE EXTERNALLY ROTATED. DECREASED STEP LENGTH RIGHT.   SLIGHT FFP WITH INCREASED UE WEIGHTBEARING.   Gait Deviations Slow Mary;Decreased step length;Decreased step height   Distance 120'   Comments voices legs feeling quivery but  not buckling   PT Exercises   Exercise Treatment STS x 5 from bed (lowest position) working on body mechanics cg@-stby@ at best   Resistive Exercises sitting man resist x 2 sets of 10: knee flex/ext, hip flex/ext, hip abd/add   Assessment   Assessment Pt cont with weeping skin. Able to inc amb distance and lessened need for physical assist. Pt EDU on body mechanics to achieve STS with greater ease and noted improvement but will cont to benefit from further practice. Dec endurance requiring low repetition and multiple sets with ex.   Safety Devices   Type of Devices Call light within reach;Bed alarm in place;Left in bed   PT Individual Minutes   Time In 0900   Time Out 1000   Minutes 60

## 2025-01-07 NOTE — PROGRESS NOTES
Occupational Therapy  Facility/Department: [facility] 8 REHAB UNIT  Rehabilitation Occupational Therapy Daily Treatment Note    Date: 25  Patient Name: Jacqueline Richardson       Room: 0818/818-02  MRN: [id number]  Account: [id number]   : 1950  (74 y.o.) Gender: female                    Past Medical History:  has a past medical history of Acquired diverticulum of bladder, Diabetes (HCC), Hypertension, Smoker, and Thyroid disease.  Past Surgical History:   has a past surgical history that includes hip surgery (Right, 09/15/2024); ep device procedure (N/A, 2024); Appendectomy; Cholecystectomy; Hysterectomy; Pancreatectomy; Splenectomy; and Tonsillectomy.    Restrictions  Restrictions/Precautions: Fall Risk, Weight Bearing  Other Position/Activity Restrictions: now WBAT per Dr Bowen per admit sheet  Right Lower Extremity Weight Bearing: Weight Bearing As Tolerated  Left Lower Extremity Weight Bearing: Weight Bearing As Tolerated    Subjective  Comments: spouse expressed that pt has been more confused than normal-nursing notified      25 1115   Pain   Pre-Pain 0   Post-Pain 0     Objective   25 1300   General   Family / Caregiver Present Yes   General Comment   Comments spouse expressed that pt has been more confused than normal-nursing notified   Balance   Sitting Balance Supervision   Transfers   Sit to stand Contact guard assistance   Stand to sit Contact guard assistance   Toilet Transfers   Toilet - Technique Ambulating   Equipment Used Grab bars   Toilet Transfer Contact guard assistance   Toilet Transfers Comments with RW   Shower Transfers   Shower - Transfer From Bed   Shower - Transfer Type To and From   Shower - Transfer To Transfer tub bench   Shower - Technique Ambulating   Shower Transfers Contact Guard   Assessment   Performance deficits / Impairments Decreased functional mobility ;Decreased ADL status;Decreased strength;Decreased balance;Decreased endurance;Decreased high-level IADLs  Concurrent Group Co-treatment   Time In 1300         Time Out 1345         Minutes 45         Timed Code Treatment Minutes: 45 Minutes       Radha Arango, OT

## 2025-01-07 NOTE — PROGRESS NOTES
Physical Therapy  Name: Jacqueline Richardson  MRN:  [id number]  Date of service:  1/7/2025 01/07/25 1345   Restrictions/Precautions   Restrictions/Precautions Fall Risk;Weight Bearing   Lower Extremity Weight Bearing Restrictions   Right Lower Extremity Weight Bearing Weight Bearing As Tolerated   Left Lower Extremity Weight Bearing Weight Bearing As Tolerated   Position Activity Restriction   Other Position/Activity Restrictions now WBAT per Dr Bowen per admit sheet   General   Additional Pertinent Hx DM, HTN, ANXIETY, RECENT HIP NAILING, DVT, UTI   Diagnosis SEPSIS D/T UTI; STAGE 2 PRESSURE ULCER SACRUM; RECENT RIGHT HIP FX S/P NAILING   General   General Comments in shower finishing with OT upon my arrival   Subjective   Subjective Pt agrees to therapy. Tired but willing.   Bed mobility   Sit to Supine Independent;Modified independent  (siderail and extended time)   Bed Mobility Comments into R side of bed with siderail but did not need assist to get legs back in bed   Transfers   Sit to Stand Contact guard assistance   Stand to Sit Contact guard assistance   Ambulation   Surface Level tile   Device Rolling Walker   Other Apparatus O2;Wheelchair follow   Assistance Contact guard assistance;Stand by assistance   Quality of Gait RECIPROCAL PATTERN.  LEFT LE EXTERNALLY ROTATED. DECREASED STEP LENGTH RIGHT.   SLIGHT FFP WITH INCREASED UE WEIGHTBEARING.   Gait Deviations Slow Mary;Decreased step length;Decreased step height   Distance 190'   PT Exercises   Resistive Exercises x 2 sets of 10 man resist: knee flex/ext, hip flex/ext  (hip flex being weakest and accepting very light resist and small range)   Standing Open/Closed Kinetic Chain Exercises at stabilized RW x 10: heel raises, hip/knee flex, mini squats   Assessment   Assessment Pt able to further inc distance despite end of day and multiple therapy sessions. Pt seemingly more confident with STS and requiring less cues to perform efficiently. Cont to break ex

## 2025-01-07 NOTE — PLAN OF CARE
Problem: Chronic Conditions and Co-morbidities  Goal: Patient's chronic conditions and co-morbidity symptoms are monitored and maintained or improved  Outcome: Progressing     Problem: Discharge Planning  Goal: Discharge to home or other facility with appropriate resources  Outcome: Progressing     Problem: Safety - Adult  Goal: Free from fall injury  Outcome: Progressing     Problem: Respiratory - Adult  Goal: Achieves optimal ventilation and oxygenation  Outcome: Progressing     Problem: Cardiovascular - Adult  Goal: Maintains optimal cardiac output and hemodynamic stability  Outcome: Progressing  Goal: Absence of cardiac dysrhythmias or at baseline  Outcome: Progressing     Problem: Musculoskeletal - Adult  Goal: Return mobility to safest level of function  Outcome: Progressing  Goal: Maintain proper alignment of affected body part  Outcome: Progressing  Goal: Return ADL status to a safe level of function  Outcome: Progressing     Problem: Genitourinary - Adult  Goal: Absence of urinary retention  Outcome: Progressing     Problem: Skin/Tissue Integrity - Adult  Goal: Skin integrity remains intact  Outcome: Progressing     Problem: Metabolic/Fluid and Electrolytes - Adult  Goal: Electrolytes maintained within normal limits  Outcome: Progressing  Goal: Hemodynamic stability and optimal renal function maintained  Outcome: Progressing  Goal: Glucose maintained within prescribed range  Outcome: Progressing     Problem: ABCDS Injury Assessment  Goal: Absence of physical injury  Outcome: Progressing     Problem: Skin/Tissue Integrity  Goal: Absence of new skin breakdown  Description: 1.  Monitor for areas of redness and/or skin breakdown  2.  Assess vascular access sites hourly  3.  Every 4-6 hours minimum:  Change oxygen saturation probe site  4.  Every 4-6 hours:  If on nasal continuous positive airway pressure, respiratory therapy assess nares and determine need for appliance change or resting period.  Outcome:

## 2025-01-07 NOTE — PROGRESS NOTES
Occupational Therapy  Facility/Department: [facility] 8 REHAB UNIT  Rehabilitation Occupational Therapy Daily Treatment Note    Date: 25  Patient Name: Jacqueline Richardson       Room: 0818/818-02  MRN: [id number]  Account: [id number]   : 1950  (74 y.o.) Gender: female                    Past Medical History:  has a past medical history of Acquired diverticulum of bladder, Diabetes (HCC), Hypertension, Smoker, and Thyroid disease.  Past Surgical History:   has a past surgical history that includes hip surgery (Right, 09/15/2024); ep device procedure (N/A, 2024); Appendectomy; Cholecystectomy; Hysterectomy; Pancreatectomy; Splenectomy; and Tonsillectomy.    Restrictions  Restrictions/Precautions: Fall Risk, Weight Bearing  Other Position/Activity Restrictions: now WBAT per Dr Bowen per admit sheet  Right Lower Extremity Weight Bearing: Weight Bearing As Tolerated  Left Lower Extremity Weight Bearing: Weight Bearing As Tolerated    Subjective   25 1115   Pain   Pre-Pain 0   Post-Pain 0       Objective     25 1115   General   Family / Caregiver Present Yes   Balance   Sitting Balance Supervision   Bed mobility   Supine to Sit Contact guard assistance  (with bedrail)   Assessment   Performance deficits / Impairments Decreased functional mobility ;Decreased ADL status;Decreased strength;Decreased balance;Decreased endurance;Decreased high-level IADLs   Activity Tolerance   Activity Tolerance Patient Tolerated treatment well   Occupational Therapy Plan   Specific Instructions for Next Treatment standing tolerance, endurance, functional mobility      25 1115   Eating   Assistance Needed Independent   CARE Score 6      25 1115   Safety Devices   Type of Devices Left in bed;Bed alarm in place;Call light within reach     Plan  Occupational Therapy Plan  Specific Instructions for Next Treatment: standing tolerance, endurance, functional mobility      Therapy Time   Individual Concurrent Group

## 2025-01-07 NOTE — PROGRESS NOTES
Patient:   Jacqueline Richardson  MR#:    [id number]   Room:    [facility]818-   YOB: 1950  Date of Progress Note: 1/7/2025  Time of Note                           8:20 AM  Consulting Physician:   Matt Nice M.D.  Attending Physician:  Matt Nice MD     Chief complaint Sepsis    S:This 74 y.o. female  with history of DM, HTN, tobacco abuse, hypothyroid, anxiety, arthritis, hip fx s/p nailing 9/15/24, post-op DVT, UTI 2 weeks ago with sepsis, and acquired diverticulum of bladder. She presented to [facility] ER on 12/31/24 with c/o three days of weakness, chills and some diarrhea. Work-up in ER Na 139, potassium 3.8, bun 16, creat 0.8, mag 1.5, lactic 3.7 with repeat of 3.2, BNP 1422, troponin 36,and 33, alk phos 384, wbc 13.3, hgb 12.6, hct 38., resp panel neg, urine 2 + bacteria, 1 wbc trace leukocyte. CXR with right basilar atelectasis. Fluid bolus of 1000 in ED. Not sepsis bolus due to edema and recent large amount of swelling from last admission . She was admitted to the Hospitalist service for sepsis. She was placed on Maxipime and vancomycin. Patient was also noted to have a stage 2 pressure ulcer to her sacrum. Patient's Lactic has returned to normal. Blood cultures, urine culture and MRSA probe are negative thus far. Changed antibiotics to Cefepime only pending final cultures. Patient remains weak overall. She is participating in both PT/OT. She is felt to need a stay on Rehab to work towards her goal of returning home with her . She is now felt ready to start the Rehab program.  No new complaints.  No new complaints.     REVIEW OF SYSTEMS:  Constitutional: No fevers No chills  Neck:No stiffness  Respiratory: No shortness of breath  Cardiovascular: No chest pain No palpitations  Gastrointestinal: No abdominal pain    Genitourinary: No Dysuria  Neurological: No headache, no confusion    Past Medical History:      Diagnosis Date    Acquired diverticulum of bladder     Diabetes (HCC)      TRUNK)   Transfers   Sit to Stand Maximum Assistance;Moderate Assistance  (FROM BED, BILAT UES ON RW)   Stand to Sit Moderate Assistance;Minimal Assistance   Ambulation   Device Rolling Walker   Other Apparatus O2  (2L)   Assistance Contact guard assistance   Quality of Gait RECIPROCAL PATTERN. NARROW RICHI   Distance 8 FT OUT AND BACK   More Ambulation? Yes   Ambulation 2   Device 2 Rolling Walker   Other Apparatus 2 O2;Wheelchair follow  (2L)   Assistance 2 Contact guard assistance   Quality of Gait 2 RECIPROCAL PATTERN.  INCREASE FORCE THROUGH UES.   Distance 80 FT   Assessment   Assessment IMPROVED ENDURANCE.  CONTINUES TO EXHIBIT PROXIMAL LE WEAKNESS.  IMPROVED SIT TO STAND WITH VCS FOR SET UP AND PROPER ANTERIOR LEAN.  IMPROVED SIT TO STAND FROM WC WITH BILAT US ON WHEELCHAIR ARM REST.                         RECORD REVIEW: Previous medical records, medications were reviewed at today's visit    IMPRESSION:   1.  Sepsis-complete antibiotics  2.  History of DVT-on Eliquis  3.  Diabetes-reduce insulin due to hypoglycemia  4.  Hypothyroidism-Synthroid  5.  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  Hypertension-on meds monitor  7.  GI-PPI/bowel regimen  8.  Vitamin D deficiency-on vitamin D  9.  Urinary incontinence/diverticulum of bladder-monitor  10.  Diarrhea-Imodium as needed  11.  PT/OT    Add midodrine and DC spironolactone with low blood pressure    Team conference with PT/OT/speech/nursing/Care coordinator to review in depth patients care and discharge planning. At least 35 minutes spent coordinating care for patient >50% of time spent in coordination of care.      WC 25 ft CGA  Ambulation 40 ft 2 L O2    Continue current care     ELOS January 17 th        Expected duration and frequency therapy: 180 minutes per day, 5 days per week    CALL WITH ANY QUESTIONS  [phone] CELL  Dr Matt Nice

## 2025-01-07 NOTE — PROGRESS NOTES
Spoke to patient and spouse in room to inform of CMG date (1/17) and to discuss discharge planning. Patient had [facility] prior to admit and she wants to continue home health services at discharge. MSW will arrange closer to discharge, will need MD order.     Electronically signed by ARPAN Patino on 1/7/25 at 12:54 PM CST

## 2025-01-08 LAB
GLUCOSE BLD-MCNC: 118 MG/DL (ref 70–99)
GLUCOSE BLD-MCNC: 200 MG/DL (ref 70–99)
GLUCOSE BLD-MCNC: 245 MG/DL (ref 70–99)
GLUCOSE BLD-MCNC: 251 MG/DL (ref 70–99)
PERFORMED ON: ABNORMAL

## 2025-01-08 PROCEDURE — 6370000000 HC RX 637 (ALT 250 FOR IP): Performed by: PSYCHIATRY & NEUROLOGY

## 2025-01-08 PROCEDURE — 97530 THERAPEUTIC ACTIVITIES: CPT

## 2025-01-08 PROCEDURE — 97535 SELF CARE MNGMENT TRAINING: CPT

## 2025-01-08 PROCEDURE — 97116 GAIT TRAINING THERAPY: CPT

## 2025-01-08 PROCEDURE — 97110 THERAPEUTIC EXERCISES: CPT

## 2025-01-08 PROCEDURE — 6370000000 HC RX 637 (ALT 250 FOR IP): Performed by: NURSE PRACTITIONER

## 2025-01-08 PROCEDURE — 99232 SBSQ HOSP IP/OBS MODERATE 35: CPT | Performed by: PSYCHIATRY & NEUROLOGY

## 2025-01-08 PROCEDURE — 82962 GLUCOSE BLOOD TEST: CPT

## 2025-01-08 PROCEDURE — 1180000000 HC REHAB R&B

## 2025-01-08 RX ADMIN — PANCRELIPASE LIPASE, PANCRELIPASE PROTEASE, PANCRELIPASE AMYLASE 5000 UNITS: 5000; 17000; 24000 CAPSULE, DELAYED RELEASE ORAL at 12:33

## 2025-01-08 RX ADMIN — PANCRELIPASE LIPASE, PANCRELIPASE PROTEASE, PANCRELIPASE AMYLASE 5000 UNITS: 5000; 17000; 24000 CAPSULE, DELAYED RELEASE ORAL at 17:48

## 2025-01-08 RX ADMIN — THERA TABS 1 TABLET: TAB at 09:01

## 2025-01-08 RX ADMIN — APIXABAN 5 MG: 5 TABLET, FILM COATED ORAL at 20:52

## 2025-01-08 RX ADMIN — LEVOTHYROXINE SODIUM 100 MCG: 0.05 TABLET ORAL at 05:40

## 2025-01-08 RX ADMIN — Medication 1 TABLET: at 09:01

## 2025-01-08 RX ADMIN — INSULIN LISPRO 2 UNITS: 100 INJECTION, SOLUTION INTRAVENOUS; SUBCUTANEOUS at 12:33

## 2025-01-08 RX ADMIN — Medication 400 MG: at 09:00

## 2025-01-08 RX ADMIN — Medication 1 TABLET: at 15:04

## 2025-01-08 RX ADMIN — APIXABAN 5 MG: 5 TABLET, FILM COATED ORAL at 09:01

## 2025-01-08 RX ADMIN — INSULIN GLARGINE 2 UNITS: 100 INJECTION, SOLUTION SUBCUTANEOUS at 09:02

## 2025-01-08 RX ADMIN — PANCRELIPASE LIPASE, PANCRELIPASE PROTEASE, PANCRELIPASE AMYLASE 5000 UNITS: 5000; 17000; 24000 CAPSULE, DELAYED RELEASE ORAL at 09:01

## 2025-01-08 RX ADMIN — MIDODRINE HYDROCHLORIDE 5 MG: 5 TABLET ORAL at 17:48

## 2025-01-08 RX ADMIN — MIDODRINE HYDROCHLORIDE 5 MG: 5 TABLET ORAL at 12:33

## 2025-01-08 RX ADMIN — Medication 1 TABLET: at 20:52

## 2025-01-08 RX ADMIN — INSULIN LISPRO 1 UNITS: 100 INJECTION, SOLUTION INTRAVENOUS; SUBCUTANEOUS at 17:48

## 2025-01-08 RX ADMIN — MIDODRINE HYDROCHLORIDE 5 MG: 5 TABLET ORAL at 09:00

## 2025-01-08 RX ADMIN — PANTOPRAZOLE SODIUM 40 MG: 40 TABLET, DELAYED RELEASE ORAL at 09:00

## 2025-01-08 NOTE — PROGRESS NOTES
Occupational Therapy  Facility/Department: [facility] 8 REHAB UNIT  Rehabilitation Occupational Therapy Daily Treatment Note    Date: 25  Patient Name: Jacqueline Richardson       Room: 0818/818-02  MRN: [id number]  Account: [id number]   : 1950  (74 y.o.) Gender: female        Diagnosis: (P) Sepsis, recent R hip fx with nailing in oct 2024 with PWB precations  Additional Pertinent Hx: (P) DM, HTN, tobacco abuse, hypothyroid, anxiety, arthritis, hip fx s/p nailing 9/15/24, post-op DVT,  UTI 2 weeks ago with sepsis, and acquired diverticulum of bladder    Treatment Diagnosis: (P) Sepsis, recent R hip fx with nailing in oct 2024 with PWB precations   Past Medical History:  has a past medical history of Acquired diverticulum of bladder, Diabetes (HCC), Hypertension, Smoker, and Thyroid disease.  Past Surgical History:   has a past surgical history that includes hip surgery (Right, 09/15/2024); ep device procedure (N/A, 2024); Appendectomy; Cholecystectomy; Hysterectomy; Pancreatectomy; Splenectomy; and Tonsillectomy.     25 1100   Restrictions/Precautions   Restrictions/Precautions Fall Risk;Weight Bearing   Position Activity Restriction   Other Position/Activity Restrictions now WBAT per Dr Bowen per admit sheet   General   Additional Pertinent Hx DM, HTN, tobacco abuse, hypothyroid, anxiety, arthritis, hip fx s/p nailing 9/15/24, post-op DVT,  UTI 2 weeks ago with sepsis, and acquired diverticulum of bladder   Diagnosis Sepsis, recent R hip fx with nailing in oct 2024 with PWB precations   Subjective   Subjective Tx focused on weaning off of O2   Vitals   SpO2 96 %  (95 with exercise, 93 with ambulation)   O2 Device None (Room air)   Functional Mobility   Functional - Mobility Device Rolling Walker   Assist Level Contact guard assistance   Functional Mobility Comments ambulated in melara while off of O2, sats remained WFLs   Transfers   Sit to stand Contact guard assistance   Stand to sit Contact guard

## 2025-01-08 NOTE — PROGRESS NOTES
Patient:   Jacqueline Richardson  MR#:    [id number]   Room:    [facility]818-   YOB: 1950  Date of Progress Note: 1/8/2025  Time of Note                           7:57 AM  Consulting Physician:   Matt Nice M.D.  Attending Physician:  Matt Nice MD     Chief complaint Sepsis    S:This 74 y.o. female  with history of DM, HTN, tobacco abuse, hypothyroid, anxiety, arthritis, hip fx s/p nailing 9/15/24, post-op DVT, UTI 2 weeks ago with sepsis, and acquired diverticulum of bladder. She presented to [facility] on 12/31/24 with c/o three days of weakness, chills and some diarrhea. Work-up in ER Na 139, potassium 3.8, bun 16, creat 0.8, mag 1.5, lactic 3.7 with repeat of 3.2, BNP 1422, troponin 36,and 33, alk phos 384, wbc 13.3, hgb 12.6, hct 38., resp panel neg, urine 2 + bacteria, 1 wbc trace leukocyte. CXR with right basilar atelectasis. Fluid bolus of 1000 in ED. Not sepsis bolus due to edema and recent large amount of swelling from last admission . She was admitted to the Hospitalist service for sepsis. She was placed on Maxipime and vancomycin. Patient was also noted to have a stage 2 pressure ulcer to her sacrum. Patient's Lactic has returned to normal. Blood cultures, urine culture and MRSA probe are negative thus far. Changed antibiotics to Cefepime only pending final cultures. Patient remains weak overall. She is participating in both PT/OT. She is felt to need a stay on Rehab to work towards her goal of returning home with her . She is now felt ready to start the Rehab program.  Feeling well this morning.  No new issues.    REVIEW OF SYSTEMS:  Constitutional: No fevers No chills  Neck:No stiffness  Respiratory: No shortness of breath  Cardiovascular: No chest pain No palpitations  Gastrointestinal: No abdominal pain    Genitourinary: No Dysuria  Neurological: No headache, no confusion    Past Medical History:      Diagnosis Date    Acquired diverticulum of bladder     Diabetes (HCC)              Electronically signed by Gretchen Khan PTA on 1/7/2025 at 2:42 PM                          RECORD REVIEW: Previous medical records, medications were reviewed at today's visit    IMPRESSION:   1.  Sepsis-complete antibiotics  2.  History of DVT-on Eliquis  3.  Diabetes-reduce insulin due to hypoglycemia  4.  Hypothyroidism-Synthroid  5.  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  Hypertension-meds discontinued due to low blood pressure  7.  GI-PPI/bowel regimen  8.  Vitamin D deficiency-on vitamin D  9.  Urinary incontinence/diverticulum of bladder-monitor  10.  Diarrhea-Imodium as needed  11.  PT/OT    Add midodrine and DC spironolactone/metoprolol with low blood pressure    Continue present care as noted    ELOS January 17 th        Expected duration and frequency therapy: 180 minutes per day, 5 days per week    CALL WITH ANY QUESTIONS  [phone] CELL  Dr Matt Nice

## 2025-01-08 NOTE — PROGRESS NOTES
Occupational Therapy  Facility/Department: [facility] 8 REHAB UNIT  Rehabilitation Occupational Therapy Daily Treatment Note    Date: 25  Patient Name: Jacqueline Richardson       Room: 0818/818-02  MRN: [id number]  Account: [id number]   : 1950  (74 y.o.) Gender: female        Diagnosis: (P) Sepsis, recent R hip fx with nailing in oct 2024 with PWB precations  Additional Pertinent Hx: (P) DM, HTN, tobacco abuse, hypothyroid, anxiety, arthritis, hip fx s/p nailing 9/15/24, post-op DVT,  UTI 2 weeks ago with sepsis, and acquired diverticulum of bladder    Treatment Diagnosis: (P) Sepsis, recent R hip fx with nailing in oct 2024 with PWB precations   Past Medical History:  has a past medical history of Acquired diverticulum of bladder, Diabetes (HCC), Hypertension, Smoker, and Thyroid disease.  Past Surgical History:   has a past surgical history that includes hip surgery (Right, 09/15/2024); ep device procedure (N/A, 2024); Appendectomy; Cholecystectomy; Hysterectomy; Pancreatectomy; Splenectomy; and Tonsillectomy.     25 1300   Restrictions/Precautions   Restrictions/Precautions Fall Risk;Weight Bearing   General   Additional Pertinent Hx DM, HTN, tobacco abuse, hypothyroid, anxiety, arthritis, hip fx s/p nailing 9/15/24, post-op DVT,  UTI 2 weeks ago with sepsis, and acquired diverticulum of bladder   Diagnosis Sepsis, recent R hip fx with nailing in oct 2024 with PWB precations   Vitals   SpO2 96 %   O2 Device None (Room air)   Balance   Standing Balance Contact guard assistance   Functional Mobility   Functional - Mobility Device 4-Wheeled Walker   Assist Level Contact guard assistance  (cues for brakes)   Functional Mobility Comments encouragement to trial Rollator as PT has been trialing d/t low endurance  (pt has a RW with walker basket at home)   Transfers   Sit to stand Contact guard assistance;Minimal assistance   Stand to sit Contact guard assistance;Minimal assistance   Transfer Comments CGA from  bed, Min A from w/c   Toilet Transfers   Toilet - Technique Ambulating   Toilet Transfer Contact guard assistance   Toilet Transfers Comments RW   OT Exercises   Exercise Treatment Elli 7.5# L 5# R, 1# FW B wrist   Resistive Exercises hand exerciser with 2 bands of resistance for edema management   Circulation/Endurance Exercises BUE   Disease-specific Exercises AROM and retrograde massage for distal UE edema   Activity Tolerance   Activity Tolerance Patient Tolerated treatment well   Assessment   Performance deficits / Impairments Decreased functional mobility ;Decreased ADL status;Decreased strength;Decreased balance;Decreased endurance;Decreased high-level IADLs   Treatment Diagnosis Sepsis, recent R hip fx with nailing in oct 2024 with PWB precations   History DM, HTN, tobacco abuse, hypothyroid, anxiety, arthritis, hip fx s/p nailing 9/15/24, post-op DVT,  UTI 2 weeks ago with sepsis, and acquired diverticulum of bladder   Time Code Minutes    Timed Code Treatment Minutes 40 Minutes   OT Individual Minutes   Time In 1305   Time Out 1345   Minutes 40

## 2025-01-08 NOTE — PLAN OF CARE
Problem: Chronic Conditions and Co-morbidities  Goal: Patient's chronic conditions and co-morbidity symptoms are monitored and maintained or improved  1/8/2025 1102 by Keira Frances RN  Outcome: Progressing  1/8/2025 0035 by Sarahi Issa LPN  Outcome: Progressing     Problem: Discharge Planning  Goal: Discharge to home or other facility with appropriate resources  1/8/2025 1102 by Keira Frances RN  Outcome: Progressing  1/8/2025 0035 by Sarahi Issa LPN  Outcome: Progressing     Problem: Safety - Adult  Goal: Free from fall injury  1/8/2025 1102 by Keira Frances RN  Outcome: Progressing  1/8/2025 0035 by Sarahi Issa LPN  Outcome: Progressing     Problem: Respiratory - Adult  Goal: Achieves optimal ventilation and oxygenation  1/8/2025 1102 by Keira Frances RN  Outcome: Progressing  1/8/2025 0035 by Sarahi Issa LPN  Outcome: Progressing     Problem: Cardiovascular - Adult  Goal: Maintains optimal cardiac output and hemodynamic stability  1/8/2025 1102 by Keira Frances RN  Outcome: Progressing  1/8/2025 0035 by Sarahi Issa LPN  Outcome: Progressing  Goal: Absence of cardiac dysrhythmias or at baseline  1/8/2025 1102 by Keira Frances RN  Outcome: Progressing  1/8/2025 0035 by Sarahi Issa LPN  Outcome: Progressing     Problem: Musculoskeletal - Adult  Goal: Return mobility to safest level of function  1/8/2025 1102 by Keira Frances RN  Outcome: Progressing  1/8/2025 0035 by Sarahi Issa LPN  Outcome: Progressing  Goal: Maintain proper alignment of affected body part  1/8/2025 1102 by Keira Frances RN  Outcome: Progressing  1/8/2025 0035 by Sarahi Issa LPN  Outcome: Progressing  Goal: Return ADL status to a safe level of function  1/8/2025 1102 by Keira Frances RN  Outcome: Progressing  1/8/2025 0035 by Sarahi Issa LPN  Outcome: Progressing     Problem: Genitourinary - Adult  Goal: Absence of urinary retention  1/8/2025 1102 by Keira Frances, RN  Outcome:  Progressing  1/8/2025 0035 by Sarahi Issa LPN  Outcome: Progressing     Problem: Skin/Tissue Integrity - Adult  Goal: Skin integrity remains intact  1/8/2025 1102 by Keira Frances RN  Outcome: Progressing  Flowsheets (Taken 1/8/2025 1101)  Skin Integrity Remains Intact: Monitor for areas of redness and/or skin breakdown  1/8/2025 0035 by Sarahi Issa LPN  Outcome: Progressing     Problem: Metabolic/Fluid and Electrolytes - Adult  Goal: Electrolytes maintained within normal limits  1/8/2025 1102 by Keira Frances RN  Outcome: Progressing  1/8/2025 0035 by Sarahi Issa LPN  Outcome: Progressing  Goal: Hemodynamic stability and optimal renal function maintained  1/8/2025 1102 by Keira Frances RN  Outcome: Progressing  1/8/2025 0035 by Sarahi Issa LPN  Outcome: Progressing  Goal: Glucose maintained within prescribed range  1/8/2025 1102 by Keira Frances RN  Outcome: Progressing  1/8/2025 0035 by Sarahi Issa LPN  Outcome: Progressing     Problem: ABCDS Injury Assessment  Goal: Absence of physical injury  1/8/2025 1102 by Keira Frances RN  Outcome: Progressing  1/8/2025 0035 by Sarahi Issa LPN  Outcome: Progressing     Problem: Skin/Tissue Integrity  Goal: Absence of new skin breakdown  Description: 1.  Monitor for areas of redness and/or skin breakdown  2.  Assess vascular access sites hourly  3.  Every 4-6 hours minimum:  Change oxygen saturation probe site  4.  Every 4-6 hours:  If on nasal continuous positive airway pressure, respiratory therapy assess nares and determine need for appliance change or resting period.  1/8/2025 1102 by Keira rFances RN  Outcome: Progressing  1/8/2025 0035 by Sarahi Issa LPN  Outcome: Progressing     Problem: Nutrition Deficit:  Goal: Optimize nutritional status  1/8/2025 1102 by Keira Frances, RN  Outcome: Progressing  1/8/2025 0035 by Sarahi Issa LPN  Outcome: Progressing     Problem: Pain  Goal: Verbalizes/displays adequate comfort level

## 2025-01-08 NOTE — PLAN OF CARE
Progressing  1/7/2025 1044 by Keira Frances, RN  Outcome: Progressing     Problem: ABCDS Injury Assessment  Goal: Absence of physical injury  1/8/2025 0035 by Sarahi Issa LPN  Outcome: Progressing  1/7/2025 1044 by Keira Frances, RN  Outcome: Progressing     Problem: Skin/Tissue Integrity  Goal: Absence of new skin breakdown  Description: 1.  Monitor for areas of redness and/or skin breakdown  2.  Assess vascular access sites hourly  3.  Every 4-6 hours minimum:  Change oxygen saturation probe site  4.  Every 4-6 hours:  If on nasal continuous positive airway pressure, respiratory therapy assess nares and determine need for appliance change or resting period.  1/8/2025 0035 by Sarahi Issa LPN  Outcome: Progressing  1/7/2025 1044 by Keira Frances, RN  Outcome: Progressing     Problem: Nutrition Deficit:  Goal: Optimize nutritional status  1/8/2025 0035 by Sarahi Issa LPN  Outcome: Progressing  1/7/2025 1044 by Keira Frances, RN  Outcome: Progressing     Problem: Skin/Tissue Integrity - Adult  Goal: Skin integrity remains intact  1/8/2025 0035 by Sarahi Issa LPN  Outcome: Progressing  1/7/2025 1044 by Keira Frances, RN  Outcome: Progressing  Flowsheets (Taken 1/7/2025 1043)  Skin Integrity Remains Intact: Monitor for areas of redness and/or skin breakdown     Problem: Metabolic/Fluid and Electrolytes - Adult  Goal: Electrolytes maintained within normal limits  1/8/2025 0035 by Sarahi Issa LPN  Outcome: Progressing  1/7/2025 1044 by Keira Frances, RN  Outcome: Progressing     Problem: Metabolic/Fluid and Electrolytes - Adult  Goal: Hemodynamic stability and optimal renal function maintained  1/8/2025 0035 by Sarahi Issa LPN  Outcome: Progressing  1/7/2025 1044 by Keira Frances, RN  Outcome: Progressing     Problem: Metabolic/Fluid and Electrolytes - Adult  Goal: Glucose maintained within prescribed range  1/8/2025 0035 by Sarahi Issa LPN  Outcome: Progressing  1/7/2025 1044 by  Keira Frances, RN  Outcome: Progressing     Problem: Pain  Goal: Verbalizes/displays adequate comfort level or baseline comfort level  1/8/2025 0035 by Sarahi Issa LPN  Outcome: Progressing  1/7/2025 1044 by Keira Frances, RN  Outcome: Progressing

## 2025-01-08 NOTE — PROGRESS NOTES
Physical Therapy  Name: Jacqueline Richardson  MRN:  [id number]  Date of service:  1/8/2025 01/08/25 0900   General   General Comments sitting eob, nsg present for med admin; pt nearly finished washing self   Subjective   Subjective Pt agreeable though states she is somewhat tired from bathing. Pt says she slept well last night.   Bed mobility   Sit to Supine Independent;Modified independent  (siderail and extended time)   Bed Mobility Comments into R side of bed with siderail but did not need assist to get legs back in bed   Transfers   Sit to Stand Minimal Assistance;Contact guard assistance  (min@ to come to stand from rollator seat, cg@ from eob)   Stand to Sit Contact guard assistance   Comment vc's for optimal body mechanics to achieve STS   Ambulation   Surface Level tile   Device Rollator   Other Apparatus O2   Assistance Contact guard assistance   Quality of Gait RECIPROCAL PATTERN.  LEFT LE EXTERNALLY ROTATED. DECREASED STEP LENGTH RIGHT.   SLIGHT FFP WITH INCREASED UE WEIGHTBEARING.   Gait Deviations Slow Mary;Decreased step length;Decreased step height   Distance 60' x 2   Comments pt with mild dizziness this am and increased edema UE's, fatigue from bathing   PT Exercises   Exercise Treatment STS x 5 from bed (lowest position) working on body mechanics cg@-stby@ at best   Resistive Exercises x 2 sets of 10 man resist: knee flex/ext, hip flex/ext  (hip flex being weakest and accepting very light resist and small range)   Assessment   Assessment Pt introduced to rollator for availability of seat if needed and to eliminate need for WC follow. Pt had requested WC in close proximity during yesterday's sessions despite making good distance w/o requesting to sit down. Pt with some reluctance to rollator due to greater ease of motion which makes her less secure/confident; however, pt managed to utilize well. Pt cont with dec endurance and having some mild dizziness so did require seated rest 1/2 way through amb.    Safety Devices   Type of Devices Call light within reach;Left in bed;Bed alarm in place;Heels elevated for pressure relief   PT Individual Minutes   Time In 0900   Time Out 1000   Minutes 60         Electronically signed by Gretchen Khan PTA on 1/8/2025 at 1:02 PM

## 2025-01-08 NOTE — PROGRESS NOTES
Physical Therapy  Name: Jacqueline Richardson  MRN:  [id number]  Date of service:  1/8/2025 01/08/25 1345   General   General Comments in WC post OT session   Subjective   Subjective Pt agreeable. Voices dislike of rollator and explained that it was more for availability of quick rest.  (will discontinue rollator; pt not on O2 at this time and improved tolerance/confidence w/o chair follow)   Vitals   Pulse 68   SpO2 100 %   O2 Device None (Room air)   Pain   Pre-Pain 3   Pain Location Other (Comment)  (LB/sacral area)   Pain Interventions Repositioning;Rest;Other (Comment)  (activity)   Transfers   Sit to Stand Minimal Assistance;Contact guard assistance  (initially min@, but cg@ at best and with change to armrest of WC)   Stand to Sit Contact guard assistance   Bed to Chair Contact guard assistance  (stand step with RW)   Comment vc's to achieve optimal body mechanics for STS   Ambulation   Surface Level tile   Device Rolling Walker   Assistance Stand by assistance;Contact guard assistance   Quality of Gait RECIPROCAL PATTERN.  LEFT LE EXTERNALLY ROTATED. DECREASED STEP LENGTH RIGHT.   SLIGHT FFP WITH INCREASED UE WEIGHTBEARING.   Gait Deviations Slow Mary;Decreased step length;Decreased step height   Distance 100'   Comments spO2 100% post amb and HR 68   PT Exercises   Exercise Treatment STS x 5 from WC (switched arm rests to desk/table style to allow pt to push from lower surface for greater efficiency and improvement noted)   Resistive Exercises x 2 sets of 10 with man resist: hip ext, knee flex/ext, hip abd/add   Assessment   Assessment Pt cont to struggle maintaining optimal positioin for STS. This afternoon she would scoot L foot fwd immediately before pushing off which created somewhat of braking action. Did manage better with adjustment to armrests allowing her to push from lower surface when rising. Pt strongly dislikes rollator so discontinued and resumed traditional RW use.Not wearing O2 this afternoon  and sats 100% post amb.   PT Individual Minutes   Time In 1345   Time Out 1430   Minutes 45         Electronically signed by Gretchen Khan PTA on 1/8/2025 at 4:37 PM

## 2025-01-09 LAB
ALBUMIN SERPL-MCNC: 2.1 G/DL (ref 3.5–5.2)
ALP SERPL-CCNC: 305 U/L (ref 35–104)
ALT SERPL-CCNC: 32 U/L (ref 5–33)
ANION GAP SERPL CALCULATED.3IONS-SCNC: 12 MMOL/L (ref 7–19)
AST SERPL-CCNC: 56 U/L (ref 5–32)
BASOPHILS # BLD: 0.1 K/UL (ref 0–0.2)
BASOPHILS NFR BLD: 1.8 % (ref 0–1)
BILIRUB SERPL-MCNC: 0.8 MG/DL (ref 0.2–1.2)
BUN SERPL-MCNC: 36 MG/DL (ref 8–23)
CALCIUM SERPL-MCNC: 7.5 MG/DL (ref 8.8–10.2)
CHLORIDE SERPL-SCNC: 110 MMOL/L (ref 98–111)
CO2 SERPL-SCNC: 20 MMOL/L (ref 22–29)
CREAT SERPL-MCNC: 0.6 MG/DL (ref 0.5–0.9)
EOSINOPHIL # BLD: 0.1 K/UL (ref 0–0.6)
EOSINOPHIL NFR BLD: 2.1 % (ref 0–5)
ERYTHROCYTE [DISTWIDTH] IN BLOOD BY AUTOMATED COUNT: 17.1 % (ref 11.5–14.5)
GLUCOSE BLD-MCNC: 108 MG/DL (ref 70–99)
GLUCOSE BLD-MCNC: 110 MG/DL (ref 70–99)
GLUCOSE BLD-MCNC: 172 MG/DL (ref 70–99)
GLUCOSE BLD-MCNC: 227 MG/DL (ref 70–99)
GLUCOSE SERPL-MCNC: 91 MG/DL (ref 70–99)
HCT VFR BLD AUTO: 30.6 % (ref 37–47)
HGB BLD-MCNC: 9 G/DL (ref 12–16)
IMM GRANULOCYTES # BLD: 0.1 K/UL
LYMPHOCYTES # BLD: 3 K/UL (ref 1.1–4.5)
LYMPHOCYTES NFR BLD: 45.6 % (ref 20–40)
MCH RBC QN AUTO: 34.5 PG (ref 27–31)
MCHC RBC AUTO-ENTMCNC: 29.4 G/DL (ref 33–37)
MCV RBC AUTO: 117.2 FL (ref 81–99)
MONOCYTES # BLD: 0.9 K/UL (ref 0–0.9)
MONOCYTES NFR BLD: 13.7 % (ref 0–10)
NEUTROPHILS # BLD: 2.4 K/UL (ref 1.5–7.5)
NEUTS SEG NFR BLD: 36 % (ref 50–65)
PERFORMED ON: ABNORMAL
PLATELET # BLD AUTO: 239 K/UL (ref 130–400)
PMV BLD AUTO: 12.7 FL (ref 9.4–12.3)
POTASSIUM SERPL-SCNC: 4.4 MMOL/L (ref 3.5–5)
PROT SERPL-MCNC: 4 G/DL (ref 6.4–8.3)
RBC # BLD AUTO: 2.61 M/UL (ref 4.2–5.4)
SODIUM SERPL-SCNC: 142 MMOL/L (ref 136–145)
WBC # BLD AUTO: 6.7 K/UL (ref 4.8–10.8)

## 2025-01-09 PROCEDURE — 97530 THERAPEUTIC ACTIVITIES: CPT

## 2025-01-09 PROCEDURE — 80053 COMPREHEN METABOLIC PANEL: CPT

## 2025-01-09 PROCEDURE — 82962 GLUCOSE BLOOD TEST: CPT

## 2025-01-09 PROCEDURE — 97110 THERAPEUTIC EXERCISES: CPT

## 2025-01-09 PROCEDURE — 97116 GAIT TRAINING THERAPY: CPT

## 2025-01-09 PROCEDURE — 97535 SELF CARE MNGMENT TRAINING: CPT

## 2025-01-09 PROCEDURE — 36415 COLL VENOUS BLD VENIPUNCTURE: CPT

## 2025-01-09 PROCEDURE — 6370000000 HC RX 637 (ALT 250 FOR IP): Performed by: PSYCHIATRY & NEUROLOGY

## 2025-01-09 PROCEDURE — 6370000000 HC RX 637 (ALT 250 FOR IP): Performed by: NURSE PRACTITIONER

## 2025-01-09 PROCEDURE — 1180000000 HC REHAB R&B

## 2025-01-09 PROCEDURE — 99232 SBSQ HOSP IP/OBS MODERATE 35: CPT | Performed by: PSYCHIATRY & NEUROLOGY

## 2025-01-09 PROCEDURE — 85025 COMPLETE CBC W/AUTO DIFF WBC: CPT

## 2025-01-09 RX ADMIN — MIDODRINE HYDROCHLORIDE 5 MG: 5 TABLET ORAL at 16:35

## 2025-01-09 RX ADMIN — ACETAMINOPHEN 650 MG: 325 TABLET ORAL at 16:07

## 2025-01-09 RX ADMIN — INSULIN LISPRO 1 UNITS: 100 INJECTION, SOLUTION INTRAVENOUS; SUBCUTANEOUS at 16:33

## 2025-01-09 RX ADMIN — MIDODRINE HYDROCHLORIDE 5 MG: 5 TABLET ORAL at 12:27

## 2025-01-09 RX ADMIN — ERGOCALCIFEROL 50000 UNITS: 1.25 CAPSULE ORAL at 07:47

## 2025-01-09 RX ADMIN — APIXABAN 5 MG: 5 TABLET, FILM COATED ORAL at 20:00

## 2025-01-09 RX ADMIN — Medication 1 TABLET: at 20:00

## 2025-01-09 RX ADMIN — APIXABAN 5 MG: 5 TABLET, FILM COATED ORAL at 07:47

## 2025-01-09 RX ADMIN — PANTOPRAZOLE SODIUM 40 MG: 40 TABLET, DELAYED RELEASE ORAL at 07:47

## 2025-01-09 RX ADMIN — MIDODRINE HYDROCHLORIDE 5 MG: 5 TABLET ORAL at 07:47

## 2025-01-09 RX ADMIN — INSULIN GLARGINE 2 UNITS: 100 INJECTION, SOLUTION SUBCUTANEOUS at 07:47

## 2025-01-09 RX ADMIN — Medication 400 MG: at 07:47

## 2025-01-09 RX ADMIN — LOPERAMIDE HYDROCHLORIDE 2 MG: 2 CAPSULE ORAL at 20:00

## 2025-01-09 RX ADMIN — LEVOTHYROXINE SODIUM 100 MCG: 0.05 TABLET ORAL at 06:03

## 2025-01-09 RX ADMIN — PANCRELIPASE LIPASE, PANCRELIPASE PROTEASE, PANCRELIPASE AMYLASE 5000 UNITS: 5000; 17000; 24000 CAPSULE, DELAYED RELEASE ORAL at 16:33

## 2025-01-09 RX ADMIN — THERA TABS 1 TABLET: TAB at 07:47

## 2025-01-09 RX ADMIN — Medication 1 TABLET: at 07:47

## 2025-01-09 RX ADMIN — PANCRELIPASE LIPASE, PANCRELIPASE PROTEASE, PANCRELIPASE AMYLASE 5000 UNITS: 5000; 17000; 24000 CAPSULE, DELAYED RELEASE ORAL at 07:47

## 2025-01-09 RX ADMIN — PANCRELIPASE LIPASE, PANCRELIPASE PROTEASE, PANCRELIPASE AMYLASE 5000 UNITS: 5000; 17000; 24000 CAPSULE, DELAYED RELEASE ORAL at 12:27

## 2025-01-09 RX ADMIN — Medication 1 TABLET: at 12:27

## 2025-01-09 ASSESSMENT — PAIN SCALES - GENERAL
PAINLEVEL_OUTOF10: 0
PAINLEVEL_OUTOF10: 4
PAINLEVEL_OUTOF10: 0

## 2025-01-09 ASSESSMENT — PAIN DESCRIPTION - LOCATION: LOCATION: BACK

## 2025-01-09 ASSESSMENT — PAIN DESCRIPTION - ORIENTATION: ORIENTATION: MID

## 2025-01-09 NOTE — PROGRESS NOTES
Facility/Department: [facility] REHAB UNIT  Occupational Therapy     Name: Jacqueline Richardson  : 1950  MRN: [id number]  Date of Service: 2025    Discharge Recommendations:  Home with Home health OT, Home with assist PRN    Past Medical History:  has a past medical history of Acquired diverticulum of bladder, Diabetes (HCC), Hypertension, Smoker, and Thyroid disease.  Past Surgical History:  has a past surgical history that includes hip surgery (Right, 09/15/2024); ep device procedure (N/A, 2024); Appendectomy; Cholecystectomy; Hysterectomy; Pancreatectomy; Splenectomy; and Tonsillectomy.    Treatment Diagnosis: Sepsis, recent R hip fx with nailing in oct 2024 with PWB precations      Assessment   Performance deficits / Impairments: Decreased functional mobility ;Decreased ADL status;Decreased strength;Decreased balance;Decreased endurance;Decreased high-level IADLs  Treatment Diagnosis: Sepsis, recent R hip fx with nailing in oct 2024 with PWB precations  Prognosis: Good  History: DM, HTN, tobacco abuse, hypothyroid, anxiety, arthritis, hip fx s/p nailing 9/15/24, post-op DVT,  UTI 2 weeks ago with sepsis, and acquired diverticulum of bladder  Activity Tolerance  Activity Tolerance: Patient Tolerated treatment well            Plan   Occupational Therapy Plan  Specific Instructions for Next Treatment: standing tolerance, endurance, functional mobility  Current Treatment Recommendations: Strengthening, Balance training, Functional mobility training, Endurance training, Equipment evaluation, education, & procurement, Patient/Caregiver education & training, Safety education & training, Self-Care / ADL, Home management training     Restrictions  Restrictions/Precautions  Restrictions/Precautions: Fall Risk, Weight Bearing  Lower Extremity Weight Bearing Restrictions  Right Lower Extremity Weight Bearing: Weight Bearing As Tolerated  Partial Weight Bearing Percentage Or Pounds: now WBAT per Dr Bowen on admit  Devices  Type of Devices: Gait belt (Given to PT.)                       Transfers  Sit to stand: Minimal assistance;Contact guard assistance  Stand to sit: Contact guard assistance  Transfer Comments: Cues for hand placement.                                          Education  Education Given To: Patient  Education Provided: Mobility Training, Transfer Training, ADL Function  Education Provided Comments: Reacher use during LB dressing.  Education Method: Demonstration, Verbal  Barriers to Learning: Cognition  Education Outcome: Verbalized understanding, Demonstrated understanding, Continued education needed       Toileting Hygiene  Assistance needed: Partial/moderate assistance  Comment: Assistance pulling up clothing.  CARE Score: 3  Discharge Goal: Independent      Toilet Transfer  Assistance needed: Supervision or touching assistance  Comment: CGA, cues for tech.  CARE Score: 4  Discharge Goal: Independent    Balance  Sitting Balance: Independent  Standing Balance: Stand by assistance      Oral Hygiene  Assistance Needed: Independent  CARE Score: 6  Discharge Goal: Independent      Shower/Bathe Self  Assistance Needed: Partial/moderate assistance  Comment: Sponge bath, Min A.  CARE Score: 3  Discharge Goal: Independent    Upper Body Dressing  Assistance Needed: Partial/moderate assistance  Comment: Min A d/t tightness of shirt and edema in BUE.  CARE Score: 3  Discharge Goal: Independent      Lower Body Dressing  Assistance Needed: Partial/moderate assistance  Comment: Assistance pulling up fully, utilized reacher to start over feet.  CARE Score: 3  Discharge Goal: Independent            Goals  Short Term Goals  Short Term Goal 1: Patient will complete HEP x 5 occasions in order to improve strength and endurance for ADLs  Short Term Goal 2: pt will complete overall toileting with CGA  Short Term Goal 3: pt will complete LB dressing with AE prn with CGA  Short Term Goal 4: pt will complete simple ambulatory home

## 2025-01-09 NOTE — PROGRESS NOTES
Physical Therapy     01/09/25 1100   Restrictions/Precautions   Restrictions/Precautions Fall Risk;Weight Bearing   Lower Extremity Weight Bearing Restrictions   Right Lower Extremity Weight Bearing Weight Bearing As Tolerated   Left Lower Extremity Weight Bearing Weight Bearing As Tolerated   General   Additional Pertinent Hx DM, HTN, ANXIETY, RECENT HIP NAILING, DVT, UTI   Diagnosis SEPSIS D/T UTI; STAGE 2 PRESSURE ULCER SACRUM; RECENT RIGHT HIP FX S/P NAILING   Subjective   Subjective pt agreeable to therapy; pt stated she felt better after resting   Pain   Pre-Pain 3   Bed mobility   Supine to Sit Modified independent   Bed Mobility Comments on L side of bed with HOB flat and use of rail.   Transfers   Sit to Stand Minimal Assistance;Contact guard assistance   Stand to Sit Contact guard assistance   Comment CGA/Benja for clothing management post void- CGA standing at sink with no LOB.   Ambulation   Surface Level tile   Device Rolling Walker   Assistance Stand by assistance   Quality of Gait RECIPROCAL PATTERN.  LEFT LE EXTERNALLY ROTATED. DECREASED STEP LENGTH RIGHT.   SLIGHT FFP WITH INCREASED UE WEIGHTBEARING.   Gait Deviations Slow Mary;Decreased step length;Decreased step height   Distance 100'   Comments no c/o of dizziness compared to this morning.   Ambulation 2   Device 2 Rolling Walker   Other Apparatus 2 Wheelchair follow   Assistance 2 Stand by assistance   Quality of Gait 2 RECIPROCAL PATTERN.  INCREASE FORCE THROUGH UES.   Gait Deviations Slow Mary;Decreased step length;Decreased step height   Distance 100'   PT Exercises   Exercise Treatment standing exercises at parallel bar to include bilat hip flex and abd. notable drop in bilat hips with ABD- required max tactile/manual cues to encouraged upright posture and correct alignment. pt unable to perform bilat hip ABD with toes at nuetral- bilat hips rotated externally. emphasis on small movements/control to decrease bilat knee adduction with

## 2025-01-09 NOTE — PLAN OF CARE
Problem: Chronic Conditions and Co-morbidities  Goal: Patient's chronic conditions and co-morbidity symptoms are monitored and maintained or improved  Outcome: Progressing     Problem: Discharge Planning  Goal: Discharge to home or other facility with appropriate resources  Outcome: Progressing     Problem: Safety - Adult  Goal: Free from fall injury  Outcome: Progressing     Problem: Respiratory - Adult  Goal: Achieves optimal ventilation and oxygenation  Outcome: Progressing     Problem: Cardiovascular - Adult  Goal: Maintains optimal cardiac output and hemodynamic stability  Outcome: Progressing  Goal: Absence of cardiac dysrhythmias or at baseline  Outcome: Progressing     Problem: Musculoskeletal - Adult  Goal: Return mobility to safest level of function  Outcome: Progressing  Goal: Maintain proper alignment of affected body part  Outcome: Progressing  Goal: Return ADL status to a safe level of function  Outcome: Progressing     Problem: Genitourinary - Adult  Goal: Absence of urinary retention  Outcome: Progressing     Problem: ABCDS Injury Assessment  Goal: Absence of physical injury  Outcome: Progressing     Problem: Skin/Tissue Integrity  Goal: Absence of new skin breakdown  Description: 1.  Monitor for areas of redness and/or skin breakdown  2.  Assess vascular access sites hourly  3.  Every 4-6 hours minimum:  Change oxygen saturation probe site  4.  Every 4-6 hours:  If on nasal continuous positive airway pressure, respiratory therapy assess nares and determine need for appliance change or resting period.  Outcome: Progressing     Problem: Nutrition Deficit:  Goal: Optimize nutritional status  Outcome: Progressing     Problem: Skin/Tissue Integrity - Adult  Goal: Skin integrity remains intact  Outcome: Progressing     Problem: Metabolic/Fluid and Electrolytes - Adult  Goal: Electrolytes maintained within normal limits  Outcome: Progressing  Goal: Hemodynamic stability and optimal renal function

## 2025-01-09 NOTE — PROGRESS NOTES
Physical Therapy     01/09/25 0900   Restrictions/Precautions   Restrictions/Precautions Fall Risk;Weight Bearing   Lower Extremity Weight Bearing Restrictions   Right Lower Extremity Weight Bearing Weight Bearing As Tolerated   Left Lower Extremity Weight Bearing Weight Bearing As Tolerated   General   Additional Pertinent Hx DM, HTN, ANXIETY, RECENT HIP NAILING, DVT, UTI   Diagnosis SEPSIS D/T UTI; STAGE 2 PRESSURE ULCER SACRUM; RECENT RIGHT HIP FX S/P NAILING   Subjective   Subjective pt agreeable to tx post OT; stated she feels a little \"shaky\" this AM   Vitals   Pulse 75   SpO2 95 %   /76   MAP (Calculated) 97   BP Location Left upper arm   BP Method Automatic   Patient Position Sitting   Comment post stair trial due to pt c/o dizziness that worsened   Pain   Pre-Pain 2   Bed mobility   Sit to Supine Independent;Modified independent   Bed Mobility Comments into R side of bed with siderail but did not need assist to get legs back in bed   Transfers   Sit to Stand Minimal Assistance;Contact guard assistance   Stand to Sit Contact guard assistance   Squat Pivot Transfers Minimal Assistance;Contact guard assistance   Comment vc's to achieve optimal body mechanics for STS. pt unable to keep knees abducted during STS- uses knees together to stabilize RLE against LLE. squat pivot from WC to EOB with use of handrail as grab bar.   Ambulation   Surface Level tile   Device Rolling Walker   Assistance Stand by assistance;Contact guard assistance   Quality of Gait RECIPROCAL PATTERN.  LEFT LE EXTERNALLY ROTATED. DECREASED STEP LENGTH RIGHT.   SLIGHT FFP WITH INCREASED UE WEIGHTBEARING.   Gait Deviations Slow Mary;Decreased step length;Decreased step height   Distance 100'   Comments SpO2 89% immeidetly post AMB that quickly came up to 95 within less than one minute   Stairs/Curb   Stairs? Yes   Stairs   # Steps  3  (3/4 steps. pt became dizzy and cut stair trial short for safety)   Stairs Height 4\"   Rails

## 2025-01-09 NOTE — PROGRESS NOTES
Patient:   Jacqueline Richardson  MR#:    [id number]   Room:    [facility]818-   YOB: 1950  Date of Progress Note: 1/9/2025  Time of Note                           8:16 AM  Consulting Physician:   Matt Nice M.D.  Attending Physician:  Matt Nice MD     Chief complaint Sepsis    S:This 74 y.o. female  with history of DM, HTN, tobacco abuse, hypothyroid, anxiety, arthritis, hip fx s/p nailing 9/15/24, post-op DVT, UTI 2 weeks ago with sepsis, and acquired diverticulum of bladder. She presented to [facility] ER on 12/31/24 with c/o three days of weakness, chills and some diarrhea. Work-up in ER Na 139, potassium 3.8, bun 16, creat 0.8, mag 1.5, lactic 3.7 with repeat of 3.2, BNP 1422, troponin 36,and 33, alk phos 384, wbc 13.3, hgb 12.6, hct 38., resp panel neg, urine 2 + bacteria, 1 wbc trace leukocyte. CXR with right basilar atelectasis. Fluid bolus of 1000 in ED. Not sepsis bolus due to edema and recent large amount of swelling from last admission . She was admitted to the Hospitalist service for sepsis. She was placed on Maxipime and vancomycin. Patient was also noted to have a stage 2 pressure ulcer to her sacrum. Patient's Lactic has returned to normal. Blood cultures, urine culture and MRSA probe are negative thus far. Changed antibiotics to Cefepime only pending final cultures. Patient remains weak overall. She is participating in both PT/OT. She is felt to need a stay on Rehab to work towards her goal of returning home with her . She is now felt ready to start the Rehab program.  No new complaints.  Off oxygen.    REVIEW OF SYSTEMS:  Constitutional: No fevers No chills  Neck:No stiffness  Respiratory: No shortness of breath  Cardiovascular: No chest pain No palpitations  Gastrointestinal: No abdominal pain    Genitourinary: No Dysuria  Neurological: No headache, no confusion    Past Medical History:      Diagnosis Date    Acquired diverticulum of bladder     Diabetes (HCC)     Hypertension

## 2025-01-09 NOTE — PLAN OF CARE
Problem: Chronic Conditions and Co-morbidities  Goal: Patient's chronic conditions and co-morbidity symptoms are monitored and maintained or improved  1/9/2025 1038 by Clarita Chisholm LPN  Outcome: Progressing  Flowsheets (Taken 1/9/2025 0754)  Care Plan - Patient's Chronic Conditions and Co-Morbidity Symptoms are Monitored and Maintained or Improved: Monitor and assess patient's chronic conditions and comorbid symptoms for stability, deterioration, or improvement  1/9/2025 0117 by Sarahi Issa LPN  Outcome: Progressing     Problem: Discharge Planning  Goal: Discharge to home or other facility with appropriate resources  1/9/2025 1038 by Clarita Chisholm LPN  Outcome: Progressing  Flowsheets (Taken 1/9/2025 0754)  Discharge to home or other facility with appropriate resources: Refer to discharge planning if patient needs post-hospital services based on physician order or complex needs related to functional status, cognitive ability or social support system  1/9/2025 0117 by Sarahi Issa LPN  Outcome: Progressing     Problem: Safety - Adult  Goal: Free from fall injury  1/9/2025 1038 by Clarita Chisholm LPN  Outcome: Progressing  1/9/2025 0117 by Sarahi Issa LPN  Outcome: Progressing     Problem: Respiratory - Adult  Goal: Achieves optimal ventilation and oxygenation  1/9/2025 1038 by Clarita Chisholm LPN  Outcome: Progressing  Flowsheets (Taken 1/9/2025 0754)  Achieves optimal ventilation and oxygenation: Assess for changes in respiratory status  1/9/2025 0117 by Sarahi Issa LPN  Outcome: Progressing     Problem: Cardiovascular - Adult  Goal: Maintains optimal cardiac output and hemodynamic stability  1/9/2025 1038 by Clarita Chisholm LPN  Outcome: Progressing  Flowsheets (Taken 1/9/2025 0754)  Maintains optimal cardiac output and hemodynamic stability: Monitor blood pressure and heart rate  1/9/2025 0117 by Sarahi Issa LPN  Outcome: Progressing  Goal: Absence of cardiac dysrhythmias

## 2025-01-10 LAB
GLUCOSE BLD-MCNC: 104 MG/DL (ref 70–99)
GLUCOSE BLD-MCNC: 153 MG/DL (ref 70–99)
GLUCOSE BLD-MCNC: 184 MG/DL (ref 70–99)
GLUCOSE BLD-MCNC: 323 MG/DL (ref 70–99)
PERFORMED ON: ABNORMAL

## 2025-01-10 PROCEDURE — 1180000000 HC REHAB R&B

## 2025-01-10 PROCEDURE — 97110 THERAPEUTIC EXERCISES: CPT

## 2025-01-10 PROCEDURE — 6370000000 HC RX 637 (ALT 250 FOR IP): Performed by: PSYCHIATRY & NEUROLOGY

## 2025-01-10 PROCEDURE — 97116 GAIT TRAINING THERAPY: CPT

## 2025-01-10 PROCEDURE — 99232 SBSQ HOSP IP/OBS MODERATE 35: CPT | Performed by: PSYCHIATRY & NEUROLOGY

## 2025-01-10 PROCEDURE — 97530 THERAPEUTIC ACTIVITIES: CPT

## 2025-01-10 PROCEDURE — 6370000000 HC RX 637 (ALT 250 FOR IP): Performed by: NURSE PRACTITIONER

## 2025-01-10 PROCEDURE — 82962 GLUCOSE BLOOD TEST: CPT

## 2025-01-10 PROCEDURE — 97535 SELF CARE MNGMENT TRAINING: CPT

## 2025-01-10 RX ORDER — MIDODRINE HYDROCHLORIDE 10 MG/1
10 TABLET ORAL
Status: DISCONTINUED | OUTPATIENT
Start: 2025-01-10 | End: 2025-01-17 | Stop reason: HOSPADM

## 2025-01-10 RX ADMIN — INSULIN GLARGINE 2 UNITS: 100 INJECTION, SOLUTION SUBCUTANEOUS at 20:43

## 2025-01-10 RX ADMIN — THERA TABS 1 TABLET: TAB at 08:58

## 2025-01-10 RX ADMIN — Medication 1 TABLET: at 08:57

## 2025-01-10 RX ADMIN — APIXABAN 5 MG: 5 TABLET, FILM COATED ORAL at 08:58

## 2025-01-10 RX ADMIN — APIXABAN 5 MG: 5 TABLET, FILM COATED ORAL at 20:43

## 2025-01-10 RX ADMIN — PANCRELIPASE LIPASE, PANCRELIPASE PROTEASE, PANCRELIPASE AMYLASE 5000 UNITS: 5000; 17000; 24000 CAPSULE, DELAYED RELEASE ORAL at 17:31

## 2025-01-10 RX ADMIN — MIDODRINE HYDROCHLORIDE 10 MG: 10 TABLET ORAL at 11:51

## 2025-01-10 RX ADMIN — PANTOPRAZOLE SODIUM 40 MG: 40 TABLET, DELAYED RELEASE ORAL at 08:58

## 2025-01-10 RX ADMIN — LEVOTHYROXINE SODIUM 100 MCG: 0.05 TABLET ORAL at 05:42

## 2025-01-10 RX ADMIN — Medication 1 TABLET: at 20:43

## 2025-01-10 RX ADMIN — ACETAMINOPHEN 650 MG: 325 TABLET ORAL at 11:51

## 2025-01-10 RX ADMIN — PANCRELIPASE LIPASE, PANCRELIPASE PROTEASE, PANCRELIPASE AMYLASE 5000 UNITS: 5000; 17000; 24000 CAPSULE, DELAYED RELEASE ORAL at 11:51

## 2025-01-10 RX ADMIN — PANCRELIPASE LIPASE, PANCRELIPASE PROTEASE, PANCRELIPASE AMYLASE 5000 UNITS: 5000; 17000; 24000 CAPSULE, DELAYED RELEASE ORAL at 08:57

## 2025-01-10 RX ADMIN — INSULIN LISPRO 3 UNITS: 100 INJECTION, SOLUTION INTRAVENOUS; SUBCUTANEOUS at 17:30

## 2025-01-10 RX ADMIN — Medication 1 TABLET: at 14:22

## 2025-01-10 RX ADMIN — MIDODRINE HYDROCHLORIDE 10 MG: 10 TABLET ORAL at 09:03

## 2025-01-10 RX ADMIN — MICONAZOLE NITRATE: 2 POWDER TOPICAL at 20:44

## 2025-01-10 RX ADMIN — INSULIN LISPRO 1 UNITS: 100 INJECTION, SOLUTION INTRAVENOUS; SUBCUTANEOUS at 20:43

## 2025-01-10 RX ADMIN — LOPERAMIDE HYDROCHLORIDE 2 MG: 2 CAPSULE ORAL at 14:22

## 2025-01-10 RX ADMIN — MIDODRINE HYDROCHLORIDE 10 MG: 10 TABLET ORAL at 17:31

## 2025-01-10 RX ADMIN — Medication 400 MG: at 08:58

## 2025-01-10 ASSESSMENT — PAIN DESCRIPTION - ORIENTATION: ORIENTATION: RIGHT

## 2025-01-10 ASSESSMENT — PAIN - FUNCTIONAL ASSESSMENT: PAIN_FUNCTIONAL_ASSESSMENT: ACTIVITIES ARE NOT PREVENTED

## 2025-01-10 ASSESSMENT — PAIN SCALES - GENERAL
PAINLEVEL_OUTOF10: 2
PAINLEVEL_OUTOF10: 4

## 2025-01-10 ASSESSMENT — PAIN DESCRIPTION - DESCRIPTORS: DESCRIPTORS: ACHING

## 2025-01-10 ASSESSMENT — PAIN DESCRIPTION - LOCATION: LOCATION: HIP

## 2025-01-10 NOTE — PROGRESS NOTES
Physical Therapy  Name: Jacqueline Richardson  MRN:  [id number]  Date of service:  1/10/2025       01/10/25 1119   Restrictions/Precautions   Restrictions/Precautions Fall Risk;Weight Bearing   Lower Extremity Weight Bearing Restrictions   Right Lower Extremity Weight Bearing Weight Bearing As Tolerated   Left Lower Extremity Weight Bearing Weight Bearing As Tolerated   Subjective   Subjective I need to practice getting up and down   Oxygen Therapy   O2 Device None (Room air)   Bed Mobility   Supine to Sit Minimal assistance   Sit to Supine Minimal assistance   Transfers   Sit to Stand Minimal Assistance   Stand to Sit Contact guard assistance   Ambulation   Surface Level tile   Device Rolling Walker   Assistance Contact guard assistance;Stand by assistance   Quality of Gait RECIPROCAL PATTERN.  LEFT LE EXTERNALLY ROTATED. DECREASED STEP LENGTH RIGHT.   SLIGHT FFP WITH INCREASED UE WEIGHTBEARING.   Gait Deviations Slow Mary;Decreased step length;Decreased step height   Distance 200 feet x 2   Ambulation 2   Surface - 2 level tile   Device 2 Rolling Walker   Assistance 2 Stand by assistance   Quality of Gait 2 RECIPROCAL PATTERN.  INCREASE FORCE THROUGH UES.   Gait Deviations Slow Mary;Decreased step length;Decreased step height   Distance 250 feet   Exercises   Hip Flexion seated marcging x 15   Knee Long Arc Quad 15   Ankle Pumps 15   Other Activities   Comment assisted to and from bathroom   Short Term Goals   Time Frame for Short Term Goals 2 WEEKS   Short Term Goal 1 BED MOBILITY MIN A WITH RAILS   Short Term Goal 2 TF SURFACE TO SURFACE MIN A WITH A.D.   Short Term Goal 3 AMBULATE 15 FT WITH RW MIN A   Short Term Goal 4 UP/DOWN 4 STEPS 1-2 RAILS MOD A   Short Term Goal 5 CAR TF MOD A WITH A.D.   Long Term Goals   Time Frame for Long Term Goals  4 WEEKS   Long Term Goal 1 INDEP BED MOB WITH RAILS   Long Term Goal 2 TF SURFACE TO SURFACE CGA WITH A.D.   Long Term Goal 3 AMBULATE 25 FT WITH RW MIN A   Long Term Goal 4

## 2025-01-10 NOTE — PROGRESS NOTES
Occupational Therapy  Facility/Department: [facility] 8 REHAB UNIT  Rehabilitation Occupational Therapy Daily Treatment Note    Date: 1/10/25  Patient Name: Jacqueline Richardson       Room: 0818/818-02  MRN: [id number]  Account: [id number]   : 1950  (74 y.o.) Gender: female        Diagnosis: (P) Sepsis, recent R hip fx with nailing in oct 2024 with PWB precations  Additional Pertinent Hx: (P) DM, HTN, tobacco abuse, hypothyroid, anxiety, arthritis, hip fx s/p nailing 9/15/24, post-op DVT,  UTI 2 weeks ago with sepsis, and acquired diverticulum of bladder    Treatment Diagnosis: (P) Sepsis, recent R hip fx with nailing in oct 2024 with PWB precations   Past Medical History:  has a past medical history of Acquired diverticulum of bladder, Diabetes (HCC), Hypertension, Smoker, and Thyroid disease.  Past Surgical History:   has a past surgical history that includes hip surgery (Right, 09/15/2024); ep device procedure (N/A, 2024); Appendectomy; Cholecystectomy; Hysterectomy; Pancreatectomy; Splenectomy; and Tonsillectomy.     01/10/25 1430   Restrictions/Precautions   Restrictions/Precautions Fall Risk;Weight Bearing   Position Activity Restriction   Other Position/Activity Restrictions now WBAT per Dr Bowen per admit sheet   General   Additional Pertinent Hx DM, HTN, tobacco abuse, hypothyroid, anxiety, arthritis, hip fx s/p nailing 9/15/24, post-op DVT,  UTI 2 weeks ago with sepsis, and acquired diverticulum of bladder   Diagnosis Sepsis, recent R hip fx with nailing in oct 2024 with PWB precations   Balance   Sitting Balance Independent   Standing Balance Stand by assistance   Bed mobility   Supine to Sit Modified independent   Sit to Supine Independent;Modified independent   Transfers   Sit to stand Contact guard assistance   Stand to sit Stand by assistance   OT Exercises   Exercise Treatment 1# FW distal with focues on wrist for edema  (L hand remains swollen)   A/AROM Exercises completed R sh exs supine with and

## 2025-01-10 NOTE — PLAN OF CARE
Problem: Chronic Conditions and Co-morbidities  Goal: Patient's chronic conditions and co-morbidity symptoms are monitored and maintained or improved  1/10/2025 1045 by Sharon Galloway RN  Outcome: Progressing  1/9/2025 2213 by Lindsay Varghese RN  Outcome: Progressing     Problem: Discharge Planning  Goal: Discharge to home or other facility with appropriate resources  1/10/2025 1045 by Sharon Galloway RN  Outcome: Progressing  1/9/2025 2213 by Lindsay Varghese RN  Outcome: Progressing     Problem: Safety - Adult  Goal: Free from fall injury  1/10/2025 1045 by Sharon Galloway RN  Outcome: Progressing  1/9/2025 2213 by Lindsay Varghese RN  Outcome: Progressing     Problem: Respiratory - Adult  Goal: Achieves optimal ventilation and oxygenation  1/10/2025 1045 by Sharon Galloway RN  Outcome: Progressing  1/9/2025 2213 by Lindsay Varghese RN  Outcome: Progressing     Problem: Cardiovascular - Adult  Goal: Maintains optimal cardiac output and hemodynamic stability  1/10/2025 1045 by Sharon Galloway RN  Outcome: Progressing  1/9/2025 2213 by Lindsay Varghese RN  Outcome: Progressing  Goal: Absence of cardiac dysrhythmias or at baseline  1/10/2025 1045 by Sharon Galloway RN  Outcome: Progressing  1/9/2025 2213 by Lindsay Varghese RN  Outcome: Progressing     Problem: Musculoskeletal - Adult  Goal: Return mobility to safest level of function  1/10/2025 1045 by Sharon Galloway RN  Outcome: Progressing  1/9/2025 2213 by Lindsay Varghese RN  Outcome: Progressing  Goal: Maintain proper alignment of affected body part  1/10/2025 1045 by Sharon Galloway RN  Outcome: Progressing  1/9/2025 2213 by Lindsay Varghese RN  Outcome: Progressing  Goal: Return ADL status to a safe level of function  1/10/2025 1045 by Sharon Galloway RN  Outcome: Progressing  1/9/2025 2213 by Lindsay Varghese RN  Outcome: Progressing     Problem: Genitourinary - Adult  Goal: Absence of urinary retention  1/10/2025  Sharon Galloway, RN  Outcome: Progressing  1/9/2025 2213 by Lindsay Varghese, RN  Outcome: Progressing

## 2025-01-10 NOTE — PROGRESS NOTES
Nutrition Assessment     Type and Reason for Visit: Reassess    Nutrition Recommendations/Plan:   Continue current interventions     Malnutrition Assessment:  Malnutrition Status: Severe malnutrition    Nutrition Assessment:  Pt is eating rather well for most meals. Intakes usually ranging from %. Pt reports drinking all of [place] supplement well to aid in wound healing.    Estimated Daily Nutrient Needs:  Energy (kcal):  1708-1732 kcals (30-35 kcals/kg) Weight Used for Energy Requirements: Current     Protein (g):  87g Weight Used for Protein Requirements: Current        Fluid (ml/day):  0789-0871 ml Method Used for Fluid Requirements: 1 ml/kcal    Nutrition Related Findings:   Glu , BUN 36, GFR >90, BM 1-10, BLE +3 weeping, BUE weeping Wound Type: Pressure Injury, Stage II    Current Nutrition Therapies:    ADULT ORAL NUTRITION SUPPLEMENT; Lunch, Dinner; Wound Healing Oral Supplement  ADULT DIET; Regular; 4 carb choices (60 gm/meal); No Added Salt (3-4 gm); NO- Lasagna, milk, vegetable blends, squasg    Anthropometric Measures:  Height: 165.1 cm (5' 5\")  Current Body Wt: 58.1 kg (128 lb 1.4 oz)   BMI: 21.3      Nutrition Diagnosis:   Increased nutrient needs, Altered nutrition-related lab values related to increase demand for energy/nutrients, endocrine dysfunction (No Pancreas) as evidenced by wounds, intake 0-25%, intake 51-75%    Nutrition Interventions:   Food and/or Nutrient Delivery: Continue Current Diet, Continue Oral Nutrition Supplement  Nutrition Education/Counseling: No recommendation at this time  Coordination of Nutrition Care: Continue to monitor while inpatient  Plan of Care discussed with: Pt    Goals:  Goals: PO intake 50% or greater, Meet at least 75% of estimated needs  Type of Goal: Continue current goal  Previous Goal Met: Progressing toward Goal(s)    Nutrition Monitoring and Evaluation:   Behavioral-Environmental Outcomes: None Identified  Food/Nutrient Intake Outcomes: Food and

## 2025-01-10 NOTE — PLAN OF CARE
Problem: Chronic Conditions and Co-morbidities  Goal: Patient's chronic conditions and co-morbidity symptoms are monitored and maintained or improved  1/9/2025 2213 by Lindsay Varghese RN  Outcome: Progressing  1/9/2025 1038 by Clarita Chisholm LPN  Outcome: Progressing  Flowsheets (Taken 1/9/2025 0754)  Care Plan - Patient's Chronic Conditions and Co-Morbidity Symptoms are Monitored and Maintained or Improved: Monitor and assess patient's chronic conditions and comorbid symptoms for stability, deterioration, or improvement     Problem: Discharge Planning  Goal: Discharge to home or other facility with appropriate resources  1/9/2025 2213 by Lindsay Varghese RN  Outcome: Progressing  1/9/2025 1038 by Clarita Chisholm LPN  Outcome: Progressing  Flowsheets (Taken 1/9/2025 0754)  Discharge to home or other facility with appropriate resources: Refer to discharge planning if patient needs post-hospital services based on physician order or complex needs related to functional status, cognitive ability or social support system     Problem: Safety - Adult  Goal: Free from fall injury  1/9/2025 2213 by Lindsay Varghese RN  Outcome: Progressing  1/9/2025 1038 by Clarita Chisholm LPN  Outcome: Progressing     Problem: Respiratory - Adult  Goal: Achieves optimal ventilation and oxygenation  1/9/2025 2213 by Lindsay Varghese RN  Outcome: Progressing  1/9/2025 1038 by Clarita Chisholm LPN  Outcome: Progressing  Flowsheets (Taken 1/9/2025 0754)  Achieves optimal ventilation and oxygenation: Assess for changes in respiratory status     Problem: Cardiovascular - Adult  Goal: Maintains optimal cardiac output and hemodynamic stability  1/9/2025 2213 by Lindsay Varghese RN  Outcome: Progressing  1/9/2025 1038 by Clarita Chisholm LPN  Outcome: Progressing  Flowsheets (Taken 1/9/2025 0754)  Maintains optimal cardiac output and hemodynamic stability: Monitor blood pressure and heart rate  Goal: Absence

## 2025-01-10 NOTE — PROGRESS NOTES
Occupational Therapy  Facility/Department: [facility] 8 REHAB UNIT  Rehabilitation Occupational Therapy Daily Treatment Note    Date: 1/10/25  Patient Name: Jacqueline Richardson       Room: 0818/818-02  MRN: [id number]  Account: [id number]   : 1950  (74 y.o.) Gender: female        Diagnosis: Sepsis, recent R hip fx with nailing in oct 2024 with PWB precations  Additional Pertinent Hx: DM, HTN, tobacco abuse, hypothyroid, anxiety, arthritis, hip fx s/p nailing 9/15/24, post-op DVT,  UTI 2 weeks ago with sepsis, and acquired diverticulum of bladder    Treatment Diagnosis: Sepsis, recent R hip fx with nailing in oct 2024 with PWB precations   Past Medical History:  has a past medical history of Acquired diverticulum of bladder, Diabetes (HCC), Hypertension, Smoker, and Thyroid disease.  Past Surgical History:   has a past surgical history that includes hip surgery (Right, 09/15/2024); ep device procedure (N/A, 2024); Appendectomy; Cholecystectomy; Hysterectomy; Pancreatectomy; Splenectomy; and Tonsillectomy.     25 1300   Restrictions/Precautions   Restrictions/Precautions Fall Risk;Weight Bearing   Position Activity Restriction   Other Position/Activity Restrictions now WBAT per Dr Bowen per admit sheet   General   Additional Pertinent Hx DM, HTN, tobacco abuse, hypothyroid, anxiety, arthritis, hip fx s/p nailing 9/15/24, post-op DVT,  UTI 2 weeks ago with sepsis, and acquired diverticulum of bladder   Diagnosis Sepsis, recent R hip fx with nailing in oct 2024 with PWB precations   Functional Mobility   Functional - Mobility Device Rolling Walker   Assist Level Contact guard assistance   Bed mobility   Supine to Sit Modified independent   Sit to Supine Independent;Modified independent   Transfers   Sit to stand Contact guard assistance  (from recliner, bed, w/c, toilet)   Transfer Comments switched to standard height w/c to improve sit -> stand   Toilet Transfers   Toilet - Technique Ambulating   Toilet Transfer

## 2025-01-10 NOTE — PROGRESS NOTES
Patient:   Jacqueline Richardson  MR#:    [id number]   Room:    [facility]818-   YOB: 1950  Date of Progress Note: 1/10/2025  Time of Note                           7:43 AM  Consulting Physician:   Matt Nice M.D.  Attending Physician:  Matt Nice MD     Chief complaint Sepsis    S:This 74 y.o. female  with history of DM, HTN, tobacco abuse, hypothyroid, anxiety, arthritis, hip fx s/p nailing 9/15/24, post-op DVT, UTI 2 weeks ago with sepsis, and acquired diverticulum of bladder. She presented to [facility] ER on 12/31/24 with c/o three days of weakness, chills and some diarrhea. Work-up in ER Na 139, potassium 3.8, bun 16, creat 0.8, mag 1.5, lactic 3.7 with repeat of 3.2, BNP 1422, troponin 36,and 33, alk phos 384, wbc 13.3, hgb 12.6, hct 38., resp panel neg, urine 2 + bacteria, 1 wbc trace leukocyte. CXR with right basilar atelectasis. Fluid bolus of 1000 in ED. Not sepsis bolus due to edema and recent large amount of swelling from last admission . She was admitted to the Hospitalist service for sepsis. She was placed on Maxipime and vancomycin. Patient was also noted to have a stage 2 pressure ulcer to her sacrum. Patient's Lactic has returned to normal. Blood cultures, urine culture and MRSA probe are negative thus far. Changed antibiotics to Cefepime only pending final cultures. Patient remains weak overall. She is participating in both PT/OT. She is felt to need a stay on Rehab to work towards her goal of returning home with her . She is now felt ready to start the Rehab program.    Off oxygen.  Still some fatigue at times.  Overall feeling well.    REVIEW OF SYSTEMS:  Constitutional: No fevers No chills  Neck:No stiffness  Respiratory: No shortness of breath  Cardiovascular: No chest pain No palpitations  Gastrointestinal: No abdominal pain    Genitourinary: No Dysuria  Neurological: No headache, no confusion    Past Medical History:      Diagnosis Date    Acquired diverticulum of bladder

## 2025-01-10 NOTE — PROGRESS NOTES
Method: Manual  Patient Position: High fowlers             Safety Devices  Type of Devices: Bed alarm in place;Call light within reach                       Transfers  Sit to stand: Contact guard assistance  Stand to sit: Stand by assistance  Transfer Comments: Cues for tech.                     Exercise Treatment: Elli: BUE 8#, 4 sets x 15 reps.                    Education  Education Given To: Patient  Education Provided: Mobility Training, Transfer Training, ADL Function  Education Provided Comments: Reacher use during LB dressing.  Education Method: Demonstration, Verbal  Barriers to Learning: Cognition  Education Outcome: Verbalized understanding, Demonstrated understanding, Continued education needed       Toileting Hygiene  Assistance needed: Supervision or touching assistance  Comment: SBA, increased time and effort to pull up clothing.  CARE Score: 4  Discharge Goal: Independent      Toilet Transfer  Assistance needed: Supervision or touching assistance  Comment: CGA.  CARE Score: 4  Discharge Goal: Independent    Balance  Sitting Balance: Independent  Standing Balance: Stand by assistance      Oral Hygiene  Assistance Needed: Independent  CARE Score: 6  Discharge Goal: Independent        Upper Body Dressing  Assistance Needed: Setup or clean-up assistance  Comment: Setup assistance.  CARE Score: 5  Discharge Goal: Independent      Lower Body Dressing  Assistance Needed: Supervision or touching assistance  Comment: Utilizes reacher PRN.  CARE Score: 4  Discharge Goal: Independent    Putting On/Taking Off Footwear  Assistance Needed: Supervision or touching assistance  Comment: Socks only.  CARE Score: 4  Discharge Goal: Independent        Goals  Short Term Goals  Short Term Goal 1: Patient will complete HEP x 5 occasions in order to improve strength and endurance for ADLs  Short Term Goal 2: pt will complete overall toileting with CGA  Short Term Goal 3: pt will complete LB dressing with AE prn with

## 2025-01-11 LAB
GLUCOSE BLD-MCNC: 164 MG/DL (ref 70–99)
GLUCOSE BLD-MCNC: 220 MG/DL (ref 70–99)
GLUCOSE BLD-MCNC: 223 MG/DL (ref 70–99)
GLUCOSE BLD-MCNC: 60 MG/DL (ref 70–99)
GLUCOSE BLD-MCNC: 86 MG/DL (ref 70–99)
PERFORMED ON: ABNORMAL
PERFORMED ON: NORMAL

## 2025-01-11 PROCEDURE — 94760 N-INVAS EAR/PLS OXIMETRY 1: CPT

## 2025-01-11 PROCEDURE — 1180000000 HC REHAB R&B

## 2025-01-11 PROCEDURE — 99232 SBSQ HOSP IP/OBS MODERATE 35: CPT | Performed by: PSYCHIATRY & NEUROLOGY

## 2025-01-11 PROCEDURE — 82962 GLUCOSE BLOOD TEST: CPT

## 2025-01-11 PROCEDURE — 6370000000 HC RX 637 (ALT 250 FOR IP): Performed by: NURSE PRACTITIONER

## 2025-01-11 PROCEDURE — 6370000000 HC RX 637 (ALT 250 FOR IP): Performed by: PSYCHIATRY & NEUROLOGY

## 2025-01-11 RX ADMIN — MIDODRINE HYDROCHLORIDE 10 MG: 10 TABLET ORAL at 16:31

## 2025-01-11 RX ADMIN — THERA TABS 1 TABLET: TAB at 08:07

## 2025-01-11 RX ADMIN — ACETAMINOPHEN 650 MG: 325 TABLET ORAL at 23:09

## 2025-01-11 RX ADMIN — ACETAMINOPHEN 650 MG: 325 TABLET ORAL at 09:03

## 2025-01-11 RX ADMIN — LEVOTHYROXINE SODIUM 100 MCG: 0.05 TABLET ORAL at 05:44

## 2025-01-11 RX ADMIN — Medication 1 TABLET: at 21:43

## 2025-01-11 RX ADMIN — MIDODRINE HYDROCHLORIDE 10 MG: 10 TABLET ORAL at 08:06

## 2025-01-11 RX ADMIN — Medication 400 MG: at 08:07

## 2025-01-11 RX ADMIN — PANCRELIPASE LIPASE, PANCRELIPASE PROTEASE, PANCRELIPASE AMYLASE 5000 UNITS: 5000; 17000; 24000 CAPSULE, DELAYED RELEASE ORAL at 12:08

## 2025-01-11 RX ADMIN — LOPERAMIDE HYDROCHLORIDE 2 MG: 2 CAPSULE ORAL at 05:44

## 2025-01-11 RX ADMIN — INSULIN LISPRO 1 UNITS: 100 INJECTION, SOLUTION INTRAVENOUS; SUBCUTANEOUS at 16:31

## 2025-01-11 RX ADMIN — APIXABAN 5 MG: 5 TABLET, FILM COATED ORAL at 21:43

## 2025-01-11 RX ADMIN — Medication 1 TABLET: at 13:48

## 2025-01-11 RX ADMIN — MICONAZOLE NITRATE: 2 POWDER TOPICAL at 21:46

## 2025-01-11 RX ADMIN — Medication 1 TABLET: at 08:07

## 2025-01-11 RX ADMIN — MIDODRINE HYDROCHLORIDE 10 MG: 10 TABLET ORAL at 12:08

## 2025-01-11 RX ADMIN — PANCRELIPASE LIPASE, PANCRELIPASE PROTEASE, PANCRELIPASE AMYLASE 5000 UNITS: 5000; 17000; 24000 CAPSULE, DELAYED RELEASE ORAL at 16:31

## 2025-01-11 RX ADMIN — LOPERAMIDE HYDROCHLORIDE 2 MG: 2 CAPSULE ORAL at 13:48

## 2025-01-11 RX ADMIN — MICONAZOLE NITRATE: 2 POWDER TOPICAL at 08:07

## 2025-01-11 RX ADMIN — PANTOPRAZOLE SODIUM 40 MG: 40 TABLET, DELAYED RELEASE ORAL at 08:07

## 2025-01-11 RX ADMIN — APIXABAN 5 MG: 5 TABLET, FILM COATED ORAL at 08:06

## 2025-01-11 RX ADMIN — PANCRELIPASE LIPASE, PANCRELIPASE PROTEASE, PANCRELIPASE AMYLASE 5000 UNITS: 5000; 17000; 24000 CAPSULE, DELAYED RELEASE ORAL at 08:06

## 2025-01-11 RX ADMIN — LOPERAMIDE HYDROCHLORIDE 2 MG: 2 CAPSULE ORAL at 21:45

## 2025-01-11 ASSESSMENT — PAIN SCALES - GENERAL
PAINLEVEL_OUTOF10: 4
PAINLEVEL_OUTOF10: 0
PAINLEVEL_OUTOF10: 0
PAINLEVEL_OUTOF10: 4

## 2025-01-11 ASSESSMENT — PAIN DESCRIPTION - DESCRIPTORS
DESCRIPTORS: ACHING
DESCRIPTORS: ACHING

## 2025-01-11 ASSESSMENT — PAIN DESCRIPTION - LOCATION
LOCATION: GENERALIZED
LOCATION: GENERALIZED

## 2025-01-11 ASSESSMENT — PAIN DESCRIPTION - PAIN TYPE: TYPE: ACUTE PAIN

## 2025-01-11 ASSESSMENT — PAIN DESCRIPTION - ORIENTATION
ORIENTATION: OTHER (COMMENT)
ORIENTATION: OTHER (COMMENT)

## 2025-01-11 NOTE — PLAN OF CARE
Problem: Chronic Conditions and Co-morbidities  Goal: Patient's chronic conditions and co-morbidity symptoms are monitored and maintained or improved  1/10/2025 2116 by Berenice Rico LPN  Outcome: Progressing  Flowsheets (Taken 1/10/2025 2043)  Care Plan - Patient's Chronic Conditions and Co-Morbidity Symptoms are Monitored and Maintained or Improved: Monitor and assess patient's chronic conditions and comorbid symptoms for stability, deterioration, or improvement  1/10/2025 1045 by Sharon Galloway, RN  Outcome: Progressing     Problem: Safety - Adult  Goal: Free from fall injury  1/10/2025 2116 by Berenice Rico LPN  Outcome: Progressing  1/10/2025 1045 by Sharon Galloway, RN  Outcome: Progressing

## 2025-01-11 NOTE — PLAN OF CARE
Problem: Chronic Conditions and Co-morbidities  Goal: Patient's chronic conditions and co-morbidity symptoms are monitored and maintained or improved  1/11/2025 0847 by Jay Unger RN  Outcome: Progressing  1/10/2025 2116 by Berenice Rico LPN  Outcome: Progressing  Flowsheets (Taken 1/10/2025 2043)  Care Plan - Patient's Chronic Conditions and Co-Morbidity Symptoms are Monitored and Maintained or Improved: Monitor and assess patient's chronic conditions and comorbid symptoms for stability, deterioration, or improvement     Problem: Discharge Planning  Goal: Discharge to home or other facility with appropriate resources  Outcome: Progressing  Flowsheets (Taken 1/10/2025 2043 by Berenice Rico LPN)  Discharge to home or other facility with appropriate resources:   Identify barriers to discharge with patient and caregiver   Refer to discharge planning if patient needs post-hospital services based on physician order or complex needs related to functional status, cognitive ability or social support system     Problem: Safety - Adult  Goal: Free from fall injury  1/11/2025 0847 by Jay Unger RN  Outcome: Progressing  1/10/2025 2116 by Berenice Rico LPN  Outcome: Progressing     Problem: Respiratory - Adult  Goal: Achieves optimal ventilation and oxygenation  Outcome: Progressing     Problem: Cardiovascular - Adult  Goal: Maintains optimal cardiac output and hemodynamic stability  Outcome: Progressing  Goal: Absence of cardiac dysrhythmias or at baseline  Outcome: Progressing     Problem: Musculoskeletal - Adult  Goal: Return mobility to safest level of function  Outcome: Progressing  Flowsheets (Taken 1/10/2025 2043 by Berenice Rico LPN)  Return Mobility to Safest Level of Function: Assess patient stability and activity tolerance for standing, transferring and ambulating with or without assistive devices  Goal: Maintain proper alignment of affected body part  Outcome: Progressing  Goal: Return ADL  optimal renal function maintained: Monitor labs and assess for signs and symptoms of volume excess or deficit  Goal: Glucose maintained within prescribed range  Outcome: Progressing  Flowsheets (Taken 1/10/2025 2043 by Berenice Rico LPN)  Glucose maintained within prescribed range: Monitor blood glucose as ordered     Problem: Pain  Goal: Verbalizes/displays adequate comfort level or baseline comfort level  Outcome: Progressing

## 2025-01-11 NOTE — PROGRESS NOTES
Patient:   Jacqueline Richardson  MR#:    [id number]   Room:    [facility]818-   YOB: 1950  Date of Progress Note: 1/11/2025  Time of Note                           7:22 AM  Consulting Physician:   Matt Nice M.D.  Attending Physician:  Matt Nice MD     Chief complaint Sepsis    S:This 74 y.o. female  with history of DM, HTN, tobacco abuse, hypothyroid, anxiety, arthritis, hip fx s/p nailing 9/15/24, post-op DVT, UTI 2 weeks ago with sepsis, and acquired diverticulum of bladder. She presented to [facility] ER on 12/31/24 with c/o three days of weakness, chills and some diarrhea. Work-up in ER Na 139, potassium 3.8, bun 16, creat 0.8, mag 1.5, lactic 3.7 with repeat of 3.2, BNP 1422, troponin 36,and 33, alk phos 384, wbc 13.3, hgb 12.6, hct 38., resp panel neg, urine 2 + bacteria, 1 wbc trace leukocyte. CXR with right basilar atelectasis. Fluid bolus of 1000 in ED. Not sepsis bolus due to edema and recent large amount of swelling from last admission . She was admitted to the Hospitalist service for sepsis. She was placed on Maxipime and vancomycin. Patient was also noted to have a stage 2 pressure ulcer to her sacrum. Patient's Lactic has returned to normal. Blood cultures, urine culture and MRSA probe are negative thus far. Changed antibiotics to Cefepime only pending final cultures. Patient remains weak overall. She is participating in both PT/OT. She is felt to need a stay on Rehab to work towards her goal of returning home with her . She is now felt ready to start the Rehab program.    Off oxygen.  Still some fatigue at times.  No new complaints.  Feeling well overall.    REVIEW OF SYSTEMS:  Constitutional: No fevers No chills  Neck:No stiffness  Respiratory: No shortness of breath  Cardiovascular: No chest pain No palpitations  Gastrointestinal: No abdominal pain    Genitourinary: No Dysuria  Neurological: No headache, no confusion    Past Medical History:      Diagnosis Date    Acquired

## 2025-01-12 LAB
GLUCOSE BLD-MCNC: 170 MG/DL (ref 70–99)
GLUCOSE BLD-MCNC: 218 MG/DL (ref 70–99)
GLUCOSE BLD-MCNC: 276 MG/DL (ref 70–99)
GLUCOSE BLD-MCNC: 315 MG/DL (ref 70–99)
PERFORMED ON: ABNORMAL

## 2025-01-12 PROCEDURE — 1180000000 HC REHAB R&B

## 2025-01-12 PROCEDURE — 99232 SBSQ HOSP IP/OBS MODERATE 35: CPT | Performed by: PSYCHIATRY & NEUROLOGY

## 2025-01-12 PROCEDURE — 6370000000 HC RX 637 (ALT 250 FOR IP): Performed by: PSYCHIATRY & NEUROLOGY

## 2025-01-12 PROCEDURE — 6370000000 HC RX 637 (ALT 250 FOR IP): Performed by: NURSE PRACTITIONER

## 2025-01-12 PROCEDURE — 82962 GLUCOSE BLOOD TEST: CPT

## 2025-01-12 RX ADMIN — Medication 1 TABLET: at 14:38

## 2025-01-12 RX ADMIN — INSULIN GLARGINE 2 UNITS: 100 INJECTION, SOLUTION SUBCUTANEOUS at 20:45

## 2025-01-12 RX ADMIN — PANCRELIPASE LIPASE, PANCRELIPASE PROTEASE, PANCRELIPASE AMYLASE 5000 UNITS: 5000; 17000; 24000 CAPSULE, DELAYED RELEASE ORAL at 16:54

## 2025-01-12 RX ADMIN — LOPERAMIDE HYDROCHLORIDE 2 MG: 2 CAPSULE ORAL at 10:43

## 2025-01-12 RX ADMIN — Medication 1 TABLET: at 10:44

## 2025-01-12 RX ADMIN — PANCRELIPASE LIPASE, PANCRELIPASE PROTEASE, PANCRELIPASE AMYLASE 5000 UNITS: 5000; 17000; 24000 CAPSULE, DELAYED RELEASE ORAL at 10:42

## 2025-01-12 RX ADMIN — APIXABAN 5 MG: 5 TABLET, FILM COATED ORAL at 10:43

## 2025-01-12 RX ADMIN — Medication 400 MG: at 10:42

## 2025-01-12 RX ADMIN — INSULIN GLARGINE 2 UNITS: 100 INJECTION, SOLUTION SUBCUTANEOUS at 10:41

## 2025-01-12 RX ADMIN — THERA TABS 1 TABLET: TAB at 10:42

## 2025-01-12 RX ADMIN — APIXABAN 5 MG: 5 TABLET, FILM COATED ORAL at 20:45

## 2025-01-12 RX ADMIN — ACETAMINOPHEN 650 MG: 325 TABLET ORAL at 16:54

## 2025-01-12 RX ADMIN — LEVOTHYROXINE SODIUM 100 MCG: 0.05 TABLET ORAL at 05:41

## 2025-01-12 RX ADMIN — INSULIN LISPRO 2 UNITS: 100 INJECTION, SOLUTION INTRAVENOUS; SUBCUTANEOUS at 16:53

## 2025-01-12 RX ADMIN — PANTOPRAZOLE SODIUM 40 MG: 40 TABLET, DELAYED RELEASE ORAL at 10:43

## 2025-01-12 RX ADMIN — MIDODRINE HYDROCHLORIDE 10 MG: 10 TABLET ORAL at 10:42

## 2025-01-12 RX ADMIN — Medication 1 TABLET: at 20:45

## 2025-01-12 RX ADMIN — MICONAZOLE NITRATE: 2 POWDER TOPICAL at 10:49

## 2025-01-12 ASSESSMENT — PAIN SCALES - GENERAL
PAINLEVEL_OUTOF10: 5
PAINLEVEL_OUTOF10: 0
PAINLEVEL_OUTOF10: 0

## 2025-01-12 ASSESSMENT — PAIN DESCRIPTION - DESCRIPTORS: DESCRIPTORS: ACHING

## 2025-01-12 ASSESSMENT — PAIN SCALES - WONG BAKER: WONGBAKER_NUMERICALRESPONSE: NO HURT

## 2025-01-12 ASSESSMENT — PAIN DESCRIPTION - ORIENTATION: ORIENTATION: RIGHT

## 2025-01-12 ASSESSMENT — PAIN DESCRIPTION - LOCATION: LOCATION: HIP

## 2025-01-12 NOTE — PLAN OF CARE
Problem: Safety - Adult  Goal: Free from fall injury  1/11/2025 1912 by Eran Warren, RN  Outcome: Progressing  1/11/2025 0847 by Jay Unger, RN  Outcome: Progressing

## 2025-01-12 NOTE — PROGRESS NOTES
Oral, Daily, Vicki Rodriguez APRN - CNP, 40 mg at 01/11/25 0807    vitamin D (ERGOCALCIFEROL) capsule 50,000 Units, 50,000 Units, Oral, Once per day on Monday Thursday, Vicki Rodriguez APRN - CNP, 50,000 Units at 01/09/25 0747    acetaminophen (TYLENOL) tablet 650 mg, 650 mg, Oral, Q4H PRN, Matt Nice MD, 650 mg at 01/11/25 2309    polyethylene glycol (GLYCOLAX) packet 17 g, 17 g, Oral, Daily PRN, Matt Nice MD    insulin lispro (HUMALOG,ADMELOG) injection vial 0-4 Units, 0-4 Units, SubCUTAneous, 4x Daily AC & HS, Matt Nice MD, 1 Units at 01/11/25 1631    miconazole (MICOTIN) 2 % powder, , Topical, BID, Matt Nice MD, Given at 01/11/25 2146    multivitamin 1 tablet, 1 tablet, Oral, Daily, Matt Nice MD, 1 tablet at 01/11/25 0807    loperamide (IMODIUM) capsule 2 mg, 2 mg, Oral, 4x Daily PRN, Matt Nice MD, 2 mg at 01/11/25 2145    Allergies:  Bacitracin, Mupirocin, Neomycin, Bacitracin-polymyxin b, Compazine [prochlorperazine], Adhesive tape, and Bacitracin-pramoxine hcl    Social History:   TOBACCO:   reports that she has been smoking cigarettes. She has never used smokeless tobacco.  ETOH:   reports that she does not currently use alcohol.    Family History:       Problem Relation Age of Onset    Cancer Brother     Hypertension Other          PHYSICAL EXAM:  /70   Pulse 95   Temp 97 °F (36.1 °C) (Temporal)   Resp 16   Ht 1.651 m (5' 5\")   Wt 58.1 kg (128 lb)   SpO2 100%   BMI 21.30 kg/m²     Constitutional - well developed, well nourished.   Eyes - conjunctiva normal.   Ear, nose, throat - No scars, masses, or lesions over external nose or ears, no atrophy of tongue  Neck-symmetric, no masses noted, no jugular vein distension  Respiration- chest wall appears symmetric, good expansion,   normal effort without use of accessory muscles  Musculoskeletal - no significant wasting of muscles noted, no bony deformities  Extremities-no clubbing, cyanosis or edema  Skin - warm,  Goals   Time Frame for Long Term Goals  4 WEEKS   Long Term Goal 1 INDEP BED MOB WITH RAILS   Long Term Goal 2 TF SURFACE TO SURFACE CGA WITH A.D.   Long Term Goal 3 AMBULATE 25 FT WITH RW MIN A   Long Term Goal 4 UP/DOWN 4 STEPS 1-2 RIALS MIN A   Long Term Goal 5 CAR TF MIN A WITH A.D.   Activity Tolerance   Activity Tolerance Patient tolerated treatment well   Assessment   Assessment pt able to tolerate all activities this second tx with no desat below 90. emphasis on tx to improve AMB distance/endurance and hip ABD strength to improve sit to stand performance/stability. improved STS from WC to RW post standing exercises with decreased knee adduction with cues. left sitting EOB with alarm on and all needs in reach.   Safety Devices   Type of Devices Gait belt   PT Individual Minutes   Time In 1100   Time Out 1200   Minutes 60      Electronically signed by Silvestre Trinh PTA on 1/9/2025 at 12:57 PM                           RECORD REVIEW: Previous medical records, medications were reviewed at today's visit    IMPRESSION:   1.  Sepsis-complete antibiotics  2.  History of DVT-on Eliquis  3.  Diabetes-reduce insulin due to hypoglycemia  4.  Hypothyroidism-Synthroid  5.  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  Hypertension-meds discontinued due to low blood pressure  7.  GI-PPI/bowel regimen  8.  Vitamin D deficiency-on vitamin D  9.  Urinary incontinence/diverticulum of bladder-monitor  10.  Diarrhea-Imodium as needed  11.  PT/OT    Increase midodrine and DC spironolactone/metoprolol with low blood pressure    Continue present care    [facility] January 17 th        Expected duration and frequency therapy: 180 minutes per day, 5 days per week    CALL WITH ANY QUESTIONS  [phone] CELL  Dr Matt Nice

## 2025-01-12 NOTE — PLAN OF CARE
Problem: Chronic Conditions and Co-morbidities  Goal: Patient's chronic conditions and co-morbidity symptoms are monitored and maintained or improved  Outcome: Progressing  Flowsheets (Taken 1/12/2025 1040)  Care Plan - Patient's Chronic Conditions and Co-Morbidity Symptoms are Monitored and Maintained or Improved: Monitor and assess patient's chronic conditions and comorbid symptoms for stability, deterioration, or improvement     Problem: Discharge Planning  Goal: Discharge to home or other facility with appropriate resources  Outcome: Progressing  Flowsheets (Taken 1/12/2025 1040)  Discharge to home or other facility with appropriate resources: Refer to discharge planning if patient needs post-hospital services based on physician order or complex needs related to functional status, cognitive ability or social support system     Problem: Safety - Adult  Goal: Free from fall injury  Outcome: Progressing     Problem: Respiratory - Adult  Goal: Achieves optimal ventilation and oxygenation  Outcome: Progressing  Flowsheets (Taken 1/12/2025 1040)  Achieves optimal ventilation and oxygenation: Assess for changes in respiratory status     Problem: Cardiovascular - Adult  Goal: Maintains optimal cardiac output and hemodynamic stability  Outcome: Progressing  Flowsheets (Taken 1/12/2025 1040)  Maintains optimal cardiac output and hemodynamic stability: Monitor blood pressure and heart rate  Goal: Absence of cardiac dysrhythmias or at baseline  Outcome: Progressing  Flowsheets (Taken 1/12/2025 1040)  Absence of cardiac dysrhythmias or at baseline: Monitor cardiac rate and rhythm     Problem: Musculoskeletal - Adult  Goal: Return mobility to safest level of function  Outcome: Progressing  Flowsheets (Taken 1/12/2025 1040)  Return Mobility to Safest Level of Function: Assess patient stability and activity tolerance for standing, transferring and ambulating with or without assistive devices  Goal: Maintain proper alignment  of affected body part  Outcome: Progressing  Flowsheets (Taken 1/12/2025 1040)  Maintain proper alignment of affected body part: Support and protect limb and body alignment per provider's orders  Goal: Return ADL status to a safe level of function  Outcome: Progressing  Flowsheets (Taken 1/12/2025 1040)  Return ADL Status to a Safe Level of Function: Administer medication as ordered     Problem: Genitourinary - Adult  Goal: Absence of urinary retention  Outcome: Progressing  Flowsheets (Taken 1/12/2025 1040)  Absence of urinary retention: Assess patient’s ability to void and empty bladder     Problem: Skin/Tissue Integrity - Adult  Goal: Skin integrity remains intact  Outcome: Progressing  Flowsheets (Taken 1/12/2025 1040)  Skin Integrity Remains Intact: Monitor for areas of redness and/or skin breakdown     Problem: Metabolic/Fluid and Electrolytes - Adult  Goal: Electrolytes maintained within normal limits  Outcome: Progressing  Flowsheets (Taken 1/12/2025 1040)  Electrolytes maintained within normal limits: Monitor labs and assess patient for signs and symptoms of electrolyte imbalances  Goal: Hemodynamic stability and optimal renal function maintained  Outcome: Progressing  Flowsheets (Taken 1/12/2025 1040)  Hemodynamic stability and optimal renal function maintained: Monitor intake, output and patient weight  Goal: Glucose maintained within prescribed range  Outcome: Progressing  Flowsheets (Taken 1/12/2025 1040)  Glucose maintained within prescribed range: Monitor blood glucose as ordered     Problem: ABCDS Injury Assessment  Goal: Absence of physical injury  Outcome: Progressing     Problem: Skin/Tissue Integrity  Goal: Absence of new skin breakdown  Description: 1.  Monitor for areas of redness and/or skin breakdown  2.  Assess vascular access sites hourly  3.  Every 4-6 hours minimum:  Change oxygen saturation probe site  4.  Every 4-6 hours:  If on nasal continuous positive airway pressure, respiratory

## 2025-01-13 LAB
ALBUMIN SERPL-MCNC: 2.2 G/DL (ref 3.5–5.2)
ALP SERPL-CCNC: 319 U/L (ref 35–104)
ALT SERPL-CCNC: 31 U/L (ref 5–33)
ANION GAP SERPL CALCULATED.3IONS-SCNC: 10 MMOL/L (ref 7–19)
AST SERPL-CCNC: 36 U/L (ref 5–32)
BASOPHILS # BLD: 0.2 K/UL (ref 0–0.2)
BASOPHILS NFR BLD: 1.5 % (ref 0–1)
BILIRUB SERPL-MCNC: 0.7 MG/DL (ref 0.2–1.2)
BUN SERPL-MCNC: 32 MG/DL (ref 8–23)
CALCIUM SERPL-MCNC: 7.5 MG/DL (ref 8.8–10.2)
CHLORIDE SERPL-SCNC: 112 MMOL/L (ref 98–111)
CO2 SERPL-SCNC: 22 MMOL/L (ref 22–29)
CREAT SERPL-MCNC: 0.6 MG/DL (ref 0.5–0.9)
EOSINOPHIL # BLD: 0 K/UL (ref 0–0.6)
EOSINOPHIL NFR BLD: 0.3 % (ref 0–5)
ERYTHROCYTE [DISTWIDTH] IN BLOOD BY AUTOMATED COUNT: 17.2 % (ref 11.5–14.5)
GLUCOSE BLD-MCNC: 151 MG/DL (ref 70–99)
GLUCOSE BLD-MCNC: 218 MG/DL (ref 70–99)
GLUCOSE BLD-MCNC: 227 MG/DL (ref 70–99)
GLUCOSE BLD-MCNC: 52 MG/DL (ref 70–99)
GLUCOSE BLD-MCNC: 56 MG/DL (ref 70–99)
GLUCOSE BLD-MCNC: 60 MG/DL (ref 70–99)
GLUCOSE BLD-MCNC: 84 MG/DL (ref 70–99)
GLUCOSE SERPL-MCNC: 41 MG/DL (ref 70–99)
HCT VFR BLD AUTO: 37.7 % (ref 37–47)
HGB BLD-MCNC: 11.2 G/DL (ref 12–16)
IMM GRANULOCYTES # BLD: 0 K/UL
LYMPHOCYTES # BLD: 2.4 K/UL (ref 1.1–4.5)
LYMPHOCYTES NFR BLD: 23.8 % (ref 20–40)
MCH RBC QN AUTO: 34.7 PG (ref 27–31)
MCHC RBC AUTO-ENTMCNC: 29.7 G/DL (ref 33–37)
MCV RBC AUTO: 116.7 FL (ref 81–99)
MONOCYTES # BLD: 0.7 K/UL (ref 0–0.9)
MONOCYTES NFR BLD: 7 % (ref 0–10)
NEUTROPHILS # BLD: 6.9 K/UL (ref 1.5–7.5)
NEUTS SEG NFR BLD: 67.1 % (ref 50–65)
PERFORMED ON: ABNORMAL
PERFORMED ON: NORMAL
PLATELET # BLD AUTO: 334 K/UL (ref 130–400)
PMV BLD AUTO: 11.5 FL (ref 9.4–12.3)
POTASSIUM SERPL-SCNC: 4.1 MMOL/L (ref 3.5–5)
PROT SERPL-MCNC: 3.8 G/DL (ref 6.4–8.3)
RBC # BLD AUTO: 3.23 M/UL (ref 4.2–5.4)
SODIUM SERPL-SCNC: 144 MMOL/L (ref 136–145)
WBC # BLD AUTO: 10.2 K/UL (ref 4.8–10.8)

## 2025-01-13 PROCEDURE — 85025 COMPLETE CBC W/AUTO DIFF WBC: CPT

## 2025-01-13 PROCEDURE — 97116 GAIT TRAINING THERAPY: CPT

## 2025-01-13 PROCEDURE — 80053 COMPREHEN METABOLIC PANEL: CPT

## 2025-01-13 PROCEDURE — 6370000000 HC RX 637 (ALT 250 FOR IP): Performed by: PSYCHIATRY & NEUROLOGY

## 2025-01-13 PROCEDURE — 82962 GLUCOSE BLOOD TEST: CPT

## 2025-01-13 PROCEDURE — 94760 N-INVAS EAR/PLS OXIMETRY 1: CPT

## 2025-01-13 PROCEDURE — 97535 SELF CARE MNGMENT TRAINING: CPT

## 2025-01-13 PROCEDURE — 6370000000 HC RX 637 (ALT 250 FOR IP): Performed by: NURSE PRACTITIONER

## 2025-01-13 PROCEDURE — 99232 SBSQ HOSP IP/OBS MODERATE 35: CPT | Performed by: PSYCHIATRY & NEUROLOGY

## 2025-01-13 PROCEDURE — 1180000000 HC REHAB R&B

## 2025-01-13 PROCEDURE — 97110 THERAPEUTIC EXERCISES: CPT

## 2025-01-13 PROCEDURE — 97530 THERAPEUTIC ACTIVITIES: CPT

## 2025-01-13 PROCEDURE — 36415 COLL VENOUS BLD VENIPUNCTURE: CPT

## 2025-01-13 RX ADMIN — Medication 400 MG: at 08:10

## 2025-01-13 RX ADMIN — PANTOPRAZOLE SODIUM 40 MG: 40 TABLET, DELAYED RELEASE ORAL at 08:10

## 2025-01-13 RX ADMIN — LEVOTHYROXINE SODIUM 100 MCG: 0.05 TABLET ORAL at 05:39

## 2025-01-13 RX ADMIN — MIDODRINE HYDROCHLORIDE 10 MG: 10 TABLET ORAL at 08:10

## 2025-01-13 RX ADMIN — PANCRELIPASE LIPASE, PANCRELIPASE PROTEASE, PANCRELIPASE AMYLASE 5000 UNITS: 5000; 17000; 24000 CAPSULE, DELAYED RELEASE ORAL at 08:10

## 2025-01-13 RX ADMIN — APIXABAN 5 MG: 5 TABLET, FILM COATED ORAL at 08:10

## 2025-01-13 RX ADMIN — Medication 1 TABLET: at 14:14

## 2025-01-13 RX ADMIN — ACETAMINOPHEN 650 MG: 325 TABLET ORAL at 15:49

## 2025-01-13 RX ADMIN — INSULIN LISPRO 1 UNITS: 100 INJECTION, SOLUTION INTRAVENOUS; SUBCUTANEOUS at 12:00

## 2025-01-13 RX ADMIN — Medication 1 TABLET: at 20:58

## 2025-01-13 RX ADMIN — MIDODRINE HYDROCHLORIDE 10 MG: 10 TABLET ORAL at 16:47

## 2025-01-13 RX ADMIN — MIDODRINE HYDROCHLORIDE 10 MG: 10 TABLET ORAL at 12:00

## 2025-01-13 RX ADMIN — ERGOCALCIFEROL 50000 UNITS: 1.25 CAPSULE ORAL at 08:10

## 2025-01-13 RX ADMIN — PANCRELIPASE LIPASE, PANCRELIPASE PROTEASE, PANCRELIPASE AMYLASE 5000 UNITS: 5000; 17000; 24000 CAPSULE, DELAYED RELEASE ORAL at 12:00

## 2025-01-13 RX ADMIN — ACETAMINOPHEN 650 MG: 325 TABLET ORAL at 10:02

## 2025-01-13 RX ADMIN — INSULIN GLARGINE 2 UNITS: 100 INJECTION, SOLUTION SUBCUTANEOUS at 09:55

## 2025-01-13 RX ADMIN — Medication 1 TABLET: at 08:10

## 2025-01-13 RX ADMIN — PANCRELIPASE LIPASE, PANCRELIPASE PROTEASE, PANCRELIPASE AMYLASE 5000 UNITS: 5000; 17000; 24000 CAPSULE, DELAYED RELEASE ORAL at 16:47

## 2025-01-13 RX ADMIN — THERA TABS 1 TABLET: TAB at 08:10

## 2025-01-13 RX ADMIN — APIXABAN 5 MG: 5 TABLET, FILM COATED ORAL at 20:59

## 2025-01-13 ASSESSMENT — PAIN DESCRIPTION - DESCRIPTORS
DESCRIPTORS: THROBBING
DESCRIPTORS: ACHING

## 2025-01-13 ASSESSMENT — PAIN SCALES - GENERAL
PAINLEVEL_OUTOF10: 3
PAINLEVEL_OUTOF10: 3
PAINLEVEL_OUTOF10: 4
PAINLEVEL_OUTOF10: 2

## 2025-01-13 ASSESSMENT — PAIN DESCRIPTION - LOCATION
LOCATION: HEAD
LOCATION: HEAD

## 2025-01-13 ASSESSMENT — PAIN DESCRIPTION - ORIENTATION
ORIENTATION: LEFT
ORIENTATION: RIGHT;LEFT

## 2025-01-13 ASSESSMENT — PAIN - FUNCTIONAL ASSESSMENT
PAIN_FUNCTIONAL_ASSESSMENT: ACTIVITIES ARE NOT PREVENTED
PAIN_FUNCTIONAL_ASSESSMENT: ACTIVITIES ARE NOT PREVENTED

## 2025-01-13 NOTE — PATIENT CARE CONFERENCE
4  Comment: Socks only.       Toilet Transfers: CARE Score: 4  Comment: CGA.       Picking Up Object:  CARE Score: 2          UE Functioning:  L WFLs proxiamally, distally continues to have edema  R limited sh ROM s/p reverse shoulder, distally WFLs and less edema vs L     Pain Assessment:          STGs:  Short Term Goals  Short Term Goal 1: Patient will complete HEP x 5 occasions in order to improve strength and endurance for ADLs  Short Term Goal 2: pt will complete overall toileting with CGA  Short Term Goal 3: pt will complete LB dressing with AE prn with CGA  Short Term Goal 4: pt will complete simple ambulatory home making task with CGA for balance  Short Term Goal 5: pt will complete overall bathing with CGA  Short Term Goal 6: pt will complete 1-2 handed standing level task for 2 mins with SBA for balance  Short Term Goal 7: MET    LTGs:  Long Term Goals  Time Frame for Long Term Goals : 2 weeks  Long Term Goal 1: pt will complete overall toileting with modified independence  Long Term Goal 2: pt will complete overall dressing with modified independence  Long Term Goal 3: pt will complete overall bathing with modified independence  Long Term Goal 4: pt will complete simple ambulatory home making task with modified independence  Long Term Goal 5: pt will complete HEP with independence  Additional Goals?: Yes    Assessment:  Performance deficits / Impairments: Decreased functional mobility , Decreased ADL status, Decreased strength, Decreased balance, Decreased endurance, Decreased high-level IADLs                 NUTRITION  Current Wt: Weight - Scale: 60.7 kg (133 lb 14.4 oz) / Body mass index is 22.28 kg/m².  Admission Wt: Admission Body Weight: 61 kg (134 lb 7.7 oz)  Oral Diet Orders: Regular; 4 carb choices (60 gm/meal) No added salt (3-4 gm)    Oral Nutrition Supplement (ONS) Orders: [place] in SR lemonade w/lunch and dinner  Pt is eating rather well for most meals. Intakes usually ranging from 50-75% and

## 2025-01-13 NOTE — PLAN OF CARE
Problem: Safety - Adult  Goal: Free from fall injury  1/12/2025 1908 by Eran Warren, RN  Outcome: Progressing  1/12/2025 1149 by Belem Ruffin, RN  Outcome: Progressing

## 2025-01-13 NOTE — PROGRESS NOTES
Facility/Department: [facility] REHAB UNIT  Occupational Therapy     Name: Jacqueline Richardson  : 1950  MRN: [id number]  Date of Service: 2025    Discharge Recommendations:  Home with Home health OT, Home with assist PRN  OT Equipment Recommendations  Equipment Needed: No  Other: Pt. has GB in shower, BSC, and RW. Pt. will order shower chair.    Past Medical History:  has a past medical history of Acquired diverticulum of bladder, Diabetes (HCC), Hypertension, Smoker, and Thyroid disease.  Past Surgical History:  has a past surgical history that includes hip surgery (Right, 09/15/2024); ep device procedure (N/A, 2024); Appendectomy; Cholecystectomy; Hysterectomy; Pancreatectomy; Splenectomy; and Tonsillectomy.    Treatment Diagnosis: Sepsis, recent R hip fx with nailing in oct 2024 with PWB precations      Assessment   Performance deficits / Impairments: Decreased functional mobility ;Decreased ADL status;Decreased strength;Decreased balance;Decreased endurance;Decreased high-level IADLs  Treatment Diagnosis: Sepsis, recent R hip fx with nailing in oct 2024 with PWB precations  Prognosis: Good  History: DM, HTN, tobacco abuse, hypothyroid, anxiety, arthritis, hip fx s/p nailing 9/15/24, post-op DVT,  UTI 2 weeks ago with sepsis, and acquired diverticulum of bladder  Activity Tolerance  Activity Tolerance: Patient Tolerated treatment well            Plan   Occupational Therapy Plan  Specific Instructions for Next Treatment: standing tolerance, endurance, functional mobility  Current Treatment Recommendations: Strengthening, Balance training, Functional mobility training, Endurance training, Equipment evaluation, education, & procurement, Patient/Caregiver education & training, Safety education & training, Self-Care / ADL, Home management training     Restrictions  Restrictions/Precautions  Restrictions/Precautions: Fall Risk, Weight Bearing  Lower Extremity Weight Bearing Restrictions  Right Lower Extremity

## 2025-01-13 NOTE — PROGRESS NOTES
Spoke to patient in room re discharge planning. Discharge is planned for Friday, 1/17. Patient would like for [facility] ([place] to resume  services after discharge. Will send home health orders when received closer to discharge. Patient denies any other dc needs.       Electronically signed by ARPAN Patino on 1/13/25 at 11:53 AM CST

## 2025-01-13 NOTE — PROGRESS NOTES
Nutrition Assessment     Type and Reason for Visit: Reassess    Nutrition Recommendations/Plan:   Continue current interventions     Malnutrition Assessment:  Malnutrition Status: Severe malnutrition    Nutrition Assessment:  Pt is eating rather well for most meals. Intakes usually ranging from 50-75% and vary at times. Pt continues to drink [place] well to aid in wound healing. No significant weight changes since admission.    Estimated Daily Nutrient Needs:  Energy (kcal):  1120-9619 kcals (30-35 kcals/kg) Weight Used for Energy Requirements: Current     Protein (g):  87g Weight Used for Protein Requirements: Current        Fluid (ml/day):  6682-2485 ml Method Used for Fluid Requirements: 1 ml/kcal    Nutrition Related Findings:   Glu , BUN 32, BM 1-12, BUE/BLE +2 pitting edema Wound Type: Pressure Injury    Current Nutrition Therapies:    ADULT ORAL NUTRITION SUPPLEMENT; Lunch, Dinner; Wound Healing Oral Supplement  ADULT DIET; Regular; 4 carb choices (60 gm/meal); No Added Salt (3-4 gm); NO- Lasagna, milk, vegetable blends, squasg    Anthropometric Measures:  Height: 165.1 cm (5' 5\")  Current Body Wt: 60.7 kg (133 lb 13.1 oz)   BMI: 22.3      Nutrition Diagnosis:   Increased nutrient needs, Altered nutrition-related lab values related to increase demand for energy/nutrients, endocrine dysfunction (No Pancreas) as evidenced by wounds, intake 0-25%, intake 51-75%    Nutrition Interventions:   Food and/or Nutrient Delivery: Continue Current Diet, Continue Oral Nutrition Supplement  Nutrition Education/Counseling: No recommendation at this time  Coordination of Nutrition Care: Continue to monitor while inpatient  Plan of Care discussed with: Pt    Goals:  Goals: PO intake 50% or greater, Meet at least 75% of estimated needs  Type of Goal: Continue current goal  Previous Goal Met: Progressing toward Goal(s)    Nutrition Monitoring and Evaluation:   Behavioral-Environmental Outcomes: None Identified  Food/Nutrient

## 2025-01-13 NOTE — PROGRESS NOTES
Facility/Department: [facility] REHAB UNIT  Occupational Therapy     Name: Jacqueline Richardson  : 1950  MRN: [id number]  Date of Service: 2025    Discharge Recommendations:  Home with Home health OT, Home with assist PRN       Past Medical History:  has a past medical history of Acquired diverticulum of bladder, Diabetes (HCC), Hypertension, Smoker, and Thyroid disease.  Past Surgical History:  has a past surgical history that includes hip surgery (Right, 09/15/2024); ep device procedure (N/A, 2024); Appendectomy; Cholecystectomy; Hysterectomy; Pancreatectomy; Splenectomy; and Tonsillectomy.    Treatment Diagnosis: Sepsis, recent R hip fx with nailing in oct 2024 with PWB precations      Assessment   Performance deficits / Impairments: Decreased functional mobility ;Decreased ADL status;Decreased strength;Decreased balance;Decreased endurance;Decreased high-level IADLs  Treatment Diagnosis: Sepsis, recent R hip fx with nailing in oct 2024 with PWB precations  Prognosis: Good  History: DM, HTN, tobacco abuse, hypothyroid, anxiety, arthritis, hip fx s/p nailing 9/15/24, post-op DVT,  UTI 2 weeks ago with sepsis, and acquired diverticulum of bladder  Activity Tolerance  Activity Tolerance: Patient Tolerated treatment well            Plan   Occupational Therapy Plan  Specific Instructions for Next Treatment: standing tolerance, endurance, functional mobility  Current Treatment Recommendations: Strengthening, Balance training, Functional mobility training, Endurance training, Equipment evaluation, education, & procurement, Patient/Caregiver education & training, Safety education & training, Self-Care / ADL, Home management training     Restrictions  Restrictions/Precautions  Restrictions/Precautions: Fall Risk, Weight Bearing  Lower Extremity Weight Bearing Restrictions  Right Lower Extremity Weight Bearing: Weight Bearing As Tolerated  Partial Weight Bearing Percentage Or Pounds: now WBAT per Dr Bowen on admit  sheet  Left Lower Extremity Weight Bearing: Weight Bearing As Tolerated  Position Activity Restriction  Other Position/Activity Restrictions: now WBAT per Dr Bowen per admit sheet    Subjective   General  Chart Reviewed: Yes, Orders  Patient assessed for rehabilitation services?: Yes  Additional Pertinent Hx: DM, HTN, tobacco abuse, hypothyroid, anxiety, arthritis, hip fx s/p nailing 9/15/24, post-op DVT,  UTI 2 weeks ago with sepsis, and acquired diverticulum of bladder  Family / Caregiver Present: Yes  Diagnosis: Sepsis, recent R hip fx with nailing in oct 2024 with PWB precations  Subjective  Subjective: Tx focused on weaning off of O2  General Comment  Comments: spouse expressed that pt has been more confused than normal-nursing notified     Social/Functional History  Social/Functional History  Lives With: Spouse  Type of Home: House  Home Layout: One level  Home Access: Stairs to enter with rails  Entrance Stairs - Number of Steps: 3  Entrance Stairs - Rails: Right  Bathroom Shower/Tub: Walk-in shower  Bathroom Toilet: Handicap height (1 standard commode)  Bathroom Equipment: Grab bars in shower, Grab bars around toilet, 3-in-1 Commode  Home Equipment: Walker - Rolling  Has the patient had two or more falls in the past year or any fall with injury in the past year?: Yes  Prior Level of Assist for ADLs: Independent  Prior Level of Assist for Homemaking: Independent  Prior Level of Assist for Transfers: Independent  Active : Yes  Mode of Transportation: Car  Occupation: Retired  Type of Occupation: RN  Leisure & Hobbies: sew, read, julianna, 1 cat \"cary\"       Objective   Functional Mobility  Functional - Mobility Device: Rolling Walker  Activity: To/from bathroom  Assist Level: Stand by assistance  Functional Mobility Comments: Short distances.    Temp: 97.5 °F (36.4 °C)  Pulse: 68  Heart Rate Source: Monitor  Respirations: 18  SpO2: 94 %  O2 Device: None (Room air)  BP: 112/66  MAP (Calculated): 81  BP

## 2025-01-13 NOTE — PROGRESS NOTES
Physical Therapy  Name: Jacqueline Richardson  MRN:  [id number]  Date of service:  1/13/2025 01/13/25 1000   Restrictions/Precautions   Restrictions/Precautions Fall Risk;Weight Bearing   Lower Extremity Weight Bearing Restrictions   Right Lower Extremity Weight Bearing Weight Bearing As Tolerated   Left Lower Extremity Weight Bearing Weight Bearing As Tolerated   General   General Comments in bed, states just had tylenol   Subjective   Subjective Reports headache and says she is overdue for injections occipital area. Pt agrees to earlier than scheduled PT.   Pain   Pre-Pain 4   Pain Location Neck;Head   Pain Descriptor Aching   Pain Interventions Other (Comment);Repositioning  (tylenol given by nsg)   Transfers   Sit to Stand Stand by assistance;Contact guard assistance   Stand to Sit Contact guard assistance;Stand by assistance   Bed to Chair Contact guard assistance;Stand by assistance  (stand step with RW)   Ambulation   Surface Level tile   Device Rolling Walker   Other Apparatus Wheelchair follow  (for distance gains)   Assistance Contact guard assistance;Stand by assistance   Quality of Gait RECIPROCAL PATTERN.  LEFT LE EXTERNALLY ROTATED. DECREASED STEP LENGTH RIGHT.   SLIGHT FFP WITH INCREASED UE WEIGHTBEARING.   Distance 200'   Comments feeling weak from am low glucose, pt reports has been treated and rising   Ambulation 2   Surface - 2 level tile   Device 2 Rolling Walker   Assistance 2 Stand by assistance   Comments amb to BR for toileting and transfer from [facility] over toilet (newly placed by PT this session)   PT Exercises   Exercise Treatment STS x 5 from WC working on optimal body mechanics with dec strength towards end though completed all with cg@ at most and vc's   Resistive Exercises x 20 with man resist: hip ext, knee flex/ext, hip abd/add  (pt typically shows signs of fatigue at 15 reps but able to complete set with exception of hip flex which was broken into 2 sets of 10)   Exercise Equipment omnicycle

## 2025-01-13 NOTE — PROGRESS NOTES
Physical Therapy  Name: Jacqueline Richardson  MRN:  [id number]  Date of service:  1/13/2025 01/13/25 1440   General   General Comments in bed   Subjective   Subjective Pt sleepy but states she is having trouble going to sleep. Agrees to therapy.   Bed mobility   Supine to Sit Modified independent   Bed Mobility Comments pt sitting eob up my exit   Transfers   Sit to Stand Stand by assistance   Stand to Sit Stand by assistance   Bed to Chair Stand by assistance  (stand step with RW)   Ambulation   Surface Level tile   Device Rolling Walker   Assistance Stand by assistance   Quality of Gait short reciprocal steps, improved pace, slight FFP   Gait Deviations Decreased step height;Decreased step length   Distance 200'   Comments states legs feel weak but shows good stability   PT Exercises   Standing Open/Closed Kinetic Chain Exercises at stabilized RW x 10: heel raises, toe raises (small range), hip/knee flex, mini squats, hip ext, hip abd  (did not need sitting rest period during these)   Assessment   Assessment Pt tired but able to participate. More consistently achieving proper optimal body mechanics for STS transitions. Pt standing tolerance also improved allowing for standing ex without seated rests. Pt inquiring about up ad maria victoria priveleges for BR. Will check tomorrow and confer with OT staff.   Safety Devices   Type of Devices Call light within reach  (declines alarms)   PT Individual Minutes   Time In 1440   Time Out 1510   Minutes 30         Electronically signed by Gretchen Khan PTA on 1/13/2025 at 4:45 PM

## 2025-01-13 NOTE — PROGRESS NOTES
Oral, Daily, Vicki Rodriguez APRN - CNP, 40 mg at 01/12/25 1043    vitamin D (ERGOCALCIFEROL) capsule 50,000 Units, 50,000 Units, Oral, Once per day on Monday Thursday, Vicki Rodriguez APRN - CNP, 50,000 Units at 01/09/25 0747    acetaminophen (TYLENOL) tablet 650 mg, 650 mg, Oral, Q4H PRN, Matt Nice MD, 650 mg at 01/12/25 1654    polyethylene glycol (GLYCOLAX) packet 17 g, 17 g, Oral, Daily PRN, Matt Nice MD    insulin lispro (HUMALOG,ADMELOG) injection vial 0-4 Units, 0-4 Units, SubCUTAneous, 4x Daily AC & HS, Matt Nice MD, 2 Units at 01/12/25 1653    miconazole (MICOTIN) 2 % powder, , Topical, BID, Matt Nice MD, Given at 01/12/25 1049    multivitamin 1 tablet, 1 tablet, Oral, Daily, Matt Nice MD, 1 tablet at 01/12/25 1042    loperamide (IMODIUM) capsule 2 mg, 2 mg, Oral, 4x Daily PRN, Matt Nice MD, 2 mg at 01/12/25 1043    Allergies:  Bacitracin, Mupirocin, Neomycin, Bacitracin-polymyxin b, Compazine [prochlorperazine], Adhesive tape, and Bacitracin-pramoxine hcl    Social History:   TOBACCO:   reports that she has been smoking cigarettes. She has never used smokeless tobacco.  ETOH:   reports that she does not currently use alcohol.    Family History:       Problem Relation Age of Onset    Cancer Brother     Hypertension Other          PHYSICAL EXAM:  /66   Pulse 64   Temp 97.5 °F (36.4 °C) (Temporal)   Resp 17   Ht 1.651 m (5' 5\")   Wt 60.7 kg (133 lb 14.4 oz)   SpO2 94%   BMI 22.28 kg/m²     Constitutional - well developed, well nourished.   Eyes - conjunctiva normal.   Ear, nose, throat - No scars, masses, or lesions over external nose or ears, no atrophy of tongue  Neck-symmetric, no masses noted, no jugular vein distension  Respiration- chest wall appears symmetric, good expansion,   normal effort without use of accessory muscles  Musculoskeletal - no significant wasting of muscles noted, no bony deformities  Extremities-no clubbing, cyanosis or edema  Skin  Tolerated   Subjective   Subjective I need to practice getting up and down   Oxygen Therapy   O2 Device None (Room air)   Bed Mobility   Supine to Sit Minimal assistance   Sit to Supine Minimal assistance   Transfers   Sit to Stand Minimal Assistance   Stand to Sit Contact guard assistance   Ambulation   Surface Level tile   Device Rolling Walker   Assistance Contact guard assistance;Stand by assistance   Quality of Gait RECIPROCAL PATTERN.  LEFT LE EXTERNALLY ROTATED. DECREASED STEP LENGTH RIGHT.   SLIGHT FFP WITH INCREASED UE WEIGHTBEARING.   Gait Deviations Slow Mary;Decreased step length;Decreased step height   Distance 200 feet x 2   Ambulation 2   Surface - 2 level tile   Device 2 Rolling Walker   Assistance 2 Stand by assistance   Quality of Gait 2 RECIPROCAL PATTERN.  INCREASE FORCE THROUGH UES.   Gait Deviations Slow Mary;Decreased step length;Decreased step height   Distance 250 feet   Exercises   Hip Flexion seated marcging x 15   Knee Long Arc Quad 15   Ankle Pumps 15   Other Activities   Comment assisted to and from bathroom   Short Term Goals   Time Frame for Short Term Goals 2 WEEKS   Short Term Goal 1 BED MOBILITY MIN A WITH RAILS   Short Term Goal 2 TF SURFACE TO SURFACE MIN A WITH A.D.   Short Term Goal 3 AMBULATE 15 FT WITH RW MIN A   Short Term Goal 4 UP/DOWN 4 STEPS 1-2 RAILS MOD A   Short Term Goal 5 CAR TF MOD A WITH A.D.   Long Term Goals   Time Frame for Long Term Goals  4 WEEKS   Long Term Goal 1 INDEP BED MOB WITH RAILS   Long Term Goal 2 TF SURFACE TO SURFACE CGA WITH A.D.   Long Term Goal 3 AMBULATE 25 FT WITH RW MIN A   Long Term Goal 4 UP/DOWN 4 STEPS 1-2 RIALS MIN A   Long Term Goal 5 CAR TF MIN A WITH A.D.   Conditions Requiring Skilled Therapeutic Intervention   Body Structures, Functions, Activity Limitations Requiring Skilled Therapeutic Intervention Decreased functional mobility ;Decreased ADL status;Decreased strength;Decreased safe awareness;Decreased balance;Decreased

## 2025-01-14 LAB
GLUCOSE BLD-MCNC: 113 MG/DL (ref 70–99)
GLUCOSE BLD-MCNC: 190 MG/DL (ref 70–99)
GLUCOSE BLD-MCNC: 190 MG/DL (ref 70–99)
GLUCOSE BLD-MCNC: 253 MG/DL (ref 70–99)
PERFORMED ON: ABNORMAL

## 2025-01-14 PROCEDURE — 6370000000 HC RX 637 (ALT 250 FOR IP): Performed by: PSYCHIATRY & NEUROLOGY

## 2025-01-14 PROCEDURE — 94760 N-INVAS EAR/PLS OXIMETRY 1: CPT

## 2025-01-14 PROCEDURE — 6370000000 HC RX 637 (ALT 250 FOR IP): Performed by: NURSE PRACTITIONER

## 2025-01-14 PROCEDURE — 97116 GAIT TRAINING THERAPY: CPT

## 2025-01-14 PROCEDURE — 97535 SELF CARE MNGMENT TRAINING: CPT

## 2025-01-14 PROCEDURE — 1180000000 HC REHAB R&B

## 2025-01-14 PROCEDURE — 99232 SBSQ HOSP IP/OBS MODERATE 35: CPT | Performed by: PSYCHIATRY & NEUROLOGY

## 2025-01-14 PROCEDURE — 82962 GLUCOSE BLOOD TEST: CPT

## 2025-01-14 PROCEDURE — 97530 THERAPEUTIC ACTIVITIES: CPT

## 2025-01-14 PROCEDURE — 97110 THERAPEUTIC EXERCISES: CPT

## 2025-01-14 RX ORDER — PANTOPRAZOLE SODIUM 40 MG/1
40 TABLET, DELAYED RELEASE ORAL DAILY
Qty: 30 TABLET | Refills: 0 | Status: SHIPPED | OUTPATIENT
Start: 2025-01-15

## 2025-01-14 RX ORDER — MIDODRINE HYDROCHLORIDE 10 MG/1
10 TABLET ORAL
Qty: 90 TABLET | Refills: 0 | Status: SHIPPED | OUTPATIENT
Start: 2025-01-14

## 2025-01-14 RX ORDER — INSULIN GLARGINE 100 [IU]/ML
2 INJECTION, SOLUTION SUBCUTANEOUS 2 TIMES DAILY
Qty: 10 ML | Refills: 0 | Status: SHIPPED | OUTPATIENT
Start: 2025-01-14 | End: 2025-01-15 | Stop reason: HOSPADM

## 2025-01-14 RX ORDER — LEVOTHYROXINE SODIUM 100 UG/1
100 TABLET ORAL DAILY
Qty: 30 TABLET | Refills: 0 | Status: SHIPPED | OUTPATIENT
Start: 2025-01-15

## 2025-01-14 RX ORDER — LOPERAMIDE HYDROCHLORIDE 2 MG/1
2 CAPSULE ORAL 4 TIMES DAILY PRN
Qty: 120 CAPSULE | Refills: 0 | Status: SHIPPED | OUTPATIENT
Start: 2025-01-14 | End: 2025-02-13

## 2025-01-14 RX ORDER — ERGOCALCIFEROL 1.25 MG/1
50000 CAPSULE ORAL
Qty: 8 CAPSULE | Refills: 0 | Status: SHIPPED | OUTPATIENT
Start: 2025-01-16

## 2025-01-14 RX ORDER — MULTIVITAMIN WITH IRON
1 TABLET ORAL DAILY
Qty: 30 TABLET | Refills: 0 | Status: SHIPPED | OUTPATIENT
Start: 2025-01-15

## 2025-01-14 RX ORDER — LANOLIN ALCOHOL/MO/W.PET/CERES
400 CREAM (GRAM) TOPICAL DAILY
Qty: 30 TABLET | Refills: 0 | Status: SHIPPED | OUTPATIENT
Start: 2025-01-15

## 2025-01-14 RX ADMIN — LEVOTHYROXINE SODIUM 100 MCG: 0.05 TABLET ORAL at 05:34

## 2025-01-14 RX ADMIN — LOPERAMIDE HYDROCHLORIDE 2 MG: 2 CAPSULE ORAL at 13:52

## 2025-01-14 RX ADMIN — ACETAMINOPHEN 650 MG: 325 TABLET ORAL at 20:15

## 2025-01-14 RX ADMIN — Medication 1 TABLET: at 20:15

## 2025-01-14 RX ADMIN — Medication 400 MG: at 07:44

## 2025-01-14 RX ADMIN — INSULIN GLARGINE 2 UNITS: 100 INJECTION, SOLUTION SUBCUTANEOUS at 07:44

## 2025-01-14 RX ADMIN — PANCRELIPASE LIPASE, PANCRELIPASE PROTEASE, PANCRELIPASE AMYLASE 5000 UNITS: 5000; 17000; 24000 CAPSULE, DELAYED RELEASE ORAL at 07:44

## 2025-01-14 RX ADMIN — MIDODRINE HYDROCHLORIDE 10 MG: 10 TABLET ORAL at 07:44

## 2025-01-14 RX ADMIN — INSULIN LISPRO 1 UNITS: 100 INJECTION, SOLUTION INTRAVENOUS; SUBCUTANEOUS at 12:01

## 2025-01-14 RX ADMIN — MIDODRINE HYDROCHLORIDE 10 MG: 10 TABLET ORAL at 17:11

## 2025-01-14 RX ADMIN — PANCRELIPASE LIPASE, PANCRELIPASE PROTEASE, PANCRELIPASE AMYLASE 5000 UNITS: 5000; 17000; 24000 CAPSULE, DELAYED RELEASE ORAL at 12:01

## 2025-01-14 RX ADMIN — LOPERAMIDE HYDROCHLORIDE 2 MG: 2 CAPSULE ORAL at 00:51

## 2025-01-14 RX ADMIN — APIXABAN 5 MG: 5 TABLET, FILM COATED ORAL at 07:44

## 2025-01-14 RX ADMIN — ACETAMINOPHEN 650 MG: 325 TABLET ORAL at 10:09

## 2025-01-14 RX ADMIN — Medication 1 TABLET: at 14:08

## 2025-01-14 RX ADMIN — PANCRELIPASE LIPASE, PANCRELIPASE PROTEASE, PANCRELIPASE AMYLASE 5000 UNITS: 5000; 17000; 24000 CAPSULE, DELAYED RELEASE ORAL at 17:11

## 2025-01-14 RX ADMIN — INSULIN GLARGINE 2 UNITS: 100 INJECTION, SOLUTION SUBCUTANEOUS at 20:24

## 2025-01-14 RX ADMIN — APIXABAN 5 MG: 5 TABLET, FILM COATED ORAL at 20:23

## 2025-01-14 RX ADMIN — THERA TABS 1 TABLET: TAB at 07:44

## 2025-01-14 RX ADMIN — Medication 1 TABLET: at 07:44

## 2025-01-14 RX ADMIN — INSULIN LISPRO 2 UNITS: 100 INJECTION, SOLUTION INTRAVENOUS; SUBCUTANEOUS at 20:24

## 2025-01-14 RX ADMIN — MIDODRINE HYDROCHLORIDE 10 MG: 10 TABLET ORAL at 12:01

## 2025-01-14 RX ADMIN — PANTOPRAZOLE SODIUM 40 MG: 40 TABLET, DELAYED RELEASE ORAL at 07:44

## 2025-01-14 ASSESSMENT — PAIN SCALES - GENERAL
PAINLEVEL_OUTOF10: 5
PAINLEVEL_OUTOF10: 0
PAINLEVEL_OUTOF10: 3
PAINLEVEL_OUTOF10: 4
PAINLEVEL_OUTOF10: 3
PAINLEVEL_OUTOF10: 0
PAINLEVEL_OUTOF10: 3

## 2025-01-14 ASSESSMENT — PAIN DESCRIPTION - ORIENTATION: ORIENTATION: RIGHT

## 2025-01-14 ASSESSMENT — PAIN DESCRIPTION - LOCATION
LOCATION: BACK
LOCATION: HEAD
LOCATION: HEAD
LOCATION: HIP

## 2025-01-14 ASSESSMENT — PAIN DESCRIPTION - DESCRIPTORS
DESCRIPTORS: ACHING;THROBBING
DESCRIPTORS: ACHING
DESCRIPTORS: ACHING
DESCRIPTORS: ACHING;THROBBING

## 2025-01-14 ASSESSMENT — PAIN DESCRIPTION - PAIN TYPE: TYPE: CHRONIC PAIN

## 2025-01-14 NOTE — PROGRESS NOTES
Name: Jacqueline Richardson  MRN:  [id number]  Date of service:  1/14/2025 01/14/25 1624   Restrictions/Precautions   Restrictions/Precautions Fall Risk;Weight Bearing   Activity Level Up Ad Rea   Lower Extremity Weight Bearing Restrictions   Right Lower Extremity Weight Bearing Weight Bearing As Tolerated   Partial Weight Bearing Percentage Or Pounds now WBAT per Dr Bowen on admit sheet   Left Lower Extremity Weight Bearing Weight Bearing As Tolerated   Position Activity Restriction   Other Position/Activity Restrictions now WBAT per Dr Bowen per admit sheet   General   Chart Reviewed Yes   Additional Pertinent Hx DM, HTN, ANXIETY, RECENT HIP NAILING, DVT, UTI   Family/Caregiver Present Yes   Referring Practitioner Vicki Rodriguez, ANKUR - CNP, Milly Main MD   Subjective   Subjective pt in bed, spouse present agrees to therapy  wants to practice steps and let spouse see how she does   Pain Assessment   Pain Assessment 0-10   Pain Level 4   Pain Location Back   Pain Descriptors Aching   Pain Type Chronic pain   Oxygen Therapy   O2 Device None (Room air)   Orientation   Overall Orientation Status WFL   Bed Mobility   Supine to Sit Modified independent   Sit to Supine Modified independent   Scooting Modified independent   Transfers   Sit to Stand Contact guard assistance;Minimal Assistance   Stand to Sit Contact guard assistance   Bed to Chair Contact guard assistance   Stand Pivot Transfers Contact guard assistance   Comment pt tfering better this after noon with less A and less tax effort   Ambulation   Surface Level tile   Device Rolling Walker   Other Apparatus Wheelchair follow   Assistance Contact guard assistance   Quality of Gait short steps decreased foot clear needs cues to keep head up   Gait Deviations Decreased step height;Decreased step length   Distance 100'x2   Comments pt fatigued with gt but tolerating better this afternoon   Stairs/Curb   Stairs? Yes   Stairs   # Steps  6   Stairs Height 4\"

## 2025-01-14 NOTE — PROGRESS NOTES
Facility/Department: [facility]  Occupational Therapy     Name: Jacqueline Richardson  : 1950  MRN: [id number]  Date of Service: 2025    Discharge Recommendations:  Home with Home health OT, Home with assist PRN          Patient Diagnosis(es): The primary encounter diagnosis was Sepsis, due to unspecified organism, unspecified whether acute organ dysfunction present (HCC). A diagnosis of Pain of right hip joint was also pertinent to this visit.  Past Medical History:  has a past medical history of Acquired diverticulum of bladder, Diabetes (HCC), Hypertension, Smoker, and Thyroid disease.  Past Surgical History:  has a past surgical history that includes hip surgery (Right, 09/15/2024); ep device procedure (N/A, 2024); Appendectomy; Cholecystectomy; Hysterectomy; Pancreatectomy; Splenectomy; and Tonsillectomy.    Treatment Diagnosis: Sepsis, recent R hip fx with nailing in oct 2024 with PWB precations      Assessment   Performance deficits / Impairments: Decreased functional mobility ;Decreased strength;Decreased endurance;Decreased high-level IADLs  Assessment: Pt. cleared for up ad rea in room with RW.  Treatment Diagnosis: Sepsis, recent R hip fx with nailing in oct 2024 with PWB precations  History: DM, HTN, tobacco abuse, hypothyroid, anxiety, arthritis, hip fx s/p nailing 9/15/24, post-op DVT,  UTI 2 weeks ago with sepsis, and acquired diverticulum of bladder  Activity Tolerance  Activity Tolerance: Patient Tolerated treatment well            Plan   Occupational Therapy Plan  Specific Instructions for Next Treatment: standing tolerance, endurance, functional mobility  Current Treatment Recommendations: Strengthening, Functional mobility training, Endurance training, Home management training     Restrictions  Restrictions/Precautions  Restrictions/Precautions: Weight Bearing  Activity Level: Up Ad Rea  Lower Extremity Weight Bearing Restrictions  Right Lower Extremity Weight Bearing: Weight

## 2025-01-14 NOTE — DISCHARGE SUMMARY
Neurology Discharge Summary     Patient Identification:  Jacqueline Richardson is a 74 y.o. female.  :  1950  Admit Date:  2025  Discharge date :    Attending Provider: Matt Nice MD     Account Number: [id number]                                   Admission Diagnoses:   Sepsis (HCC) [A41.9]    Discharge Diagnoses:  Principal Problem:    Sepsis (HCC)  Active Problems:    Diabetes (HCC)    Hypertension    Hypothyroidism    Closed subcapital fracture of femur, right, with routine healing, subsequent encounter    Chronic low back pain    Dizziness    Generalized anxiety disorder    History of pancreatectomy    Mixed hyperlipidemia    Moderate recurrent major depression (HCC)    Severe malnutrition (HCC)  Resolved Problems:    * No resolved hospital problems. *      Discharge Medications:    Current Discharge Medication List             Details   Needles & Syringes MISC 1 each by Does not apply route daily Insulin needles and syringes  Qty: 100 each, Refills: 0      loperamide (IMODIUM) 2 MG capsule Take 1 capsule by mouth 4 times daily as needed for Diarrhea  Qty: 120 capsule, Refills: 0      miconazole (MICOTIN) 2 % powder Apply topically 2 times daily.  Qty: 45 g, Refills: 0      magnesium oxide (MAG-OX) 400 (240 Mg) MG tablet Take 1 tablet by mouth daily  Qty: 30 tablet, Refills: 0      Calcium Carb-Cholecalciferol (OYSTER SHELL CALCIUM W/D) 500-5 MG-MCG TABS tablet Take 1 tablet by mouth 3 times daily  Qty: 90 tablet, Refills: 0      Multiple Vitamin (MULTIVITAMIN) TABS tablet Take 1 tablet by mouth daily  Qty: 30 tablet, Refills: 0      midodrine (PROAMATINE) 10 MG tablet Take 1 tablet by mouth 3 times daily (with meals)  Qty: 90 tablet, Refills: 0                Details   apixaban (ELIQUIS) 5 MG TABS tablet Take 1 tablet by mouth 2 times daily  Qty: 60 tablet, Refills: 0      lipase-protease-amylase (CREON) 6000-47547 units delayed release capsule Take by mouth 3 times daily (with

## 2025-01-14 NOTE — PROGRESS NOTES
Name: Jacqueline Richardson  MRN:  [id number]  Date of service:  1/14/2025 01/14/25 1000   Restrictions/Precautions   Restrictions/Precautions Fall Risk;Weight Bearing   Lower Extremity Weight Bearing Restrictions   Right Lower Extremity Weight Bearing Weight Bearing As Tolerated   Partial Weight Bearing Percentage Or Pounds now WBAT per Dr Bowen on admit sheet   Left Lower Extremity Weight Bearing Weight Bearing As Tolerated   Position Activity Restriction   Other Position/Activity Restrictions now WBAT per Dr Bowen per admit sheet   General   Chart Reviewed Yes   Additional Pertinent Hx DM, HTN, ANXIETY, RECENT HIP NAILING, DVT, UTI   Family/Caregiver Present No   Referring Practitioner Vicki Rodriguez, ANKUR - CNP, Milly Main MD   Subjective   Subjective pt in bed, agrees to therapy   Pain Assessment   Pain Assessment 0-10   Pain Level 3   Pain Location Head  (nsg gave pt tylenol for head prior to pt leaving room with therapist)   Pain Descriptors Aching;Throbbing   Oxygen Therapy   O2 Device None (Room air)   Orientation   Overall Orientation Status WFL   Bed Mobility   Supine to Sit Modified independent   Sit to Supine Modified independent   Scooting Modified independent   Transfers   Sit to Stand Minimal Assistance;Contact guard assistance   Stand to Sit Contact guard assistance   Comment pt having difficulty with sit stand today from bed, toilet and wc needing extra time to complete, several rocking motion attempts and cga min A   Ambulation   Surface Level tile   Device Rolling Walker   Other Apparatus Wheelchair follow   Assistance Contact guard assistance   Quality of Gait short steps decreased foot clear needs cues to keep head up   Gait Deviations Decreased step height;Decreased step length   Distance 100'x3   Comments pt fatigued with gt quickly today, weak les   Stairs/Curb   Stairs? Yes   Stairs   # Steps  3   Stairs Height 4\"   Rails Left ascending   Assistance Minimal assistance   Comment pt

## 2025-01-14 NOTE — PLAN OF CARE
Problem: Safety - Adult  Goal: Free from fall injury  1/13/2025 1925 by Eran Warren, RN  Outcome: Progressing  1/13/2025 0900 by Екатерина Davis, RN  Outcome: Progressing

## 2025-01-14 NOTE — PLAN OF CARE
Problem: Chronic Conditions and Co-morbidities  Goal: Patient's chronic conditions and co-morbidity symptoms are monitored and maintained or improved  Outcome: Progressing  Flowsheets (Taken 1/14/2025 0746)  Care Plan - Patient's Chronic Conditions and Co-Morbidity Symptoms are Monitored and Maintained or Improved: Monitor and assess patient's chronic conditions and comorbid symptoms for stability, deterioration, or improvement     Problem: Discharge Planning  Goal: Discharge to home or other facility with appropriate resources  Outcome: Progressing  Flowsheets (Taken 1/14/2025 0746)  Discharge to home or other facility with appropriate resources: Refer to discharge planning if patient needs post-hospital services based on physician order or complex needs related to functional status, cognitive ability or social support system     Problem: Safety - Adult  Goal: Free from fall injury  Outcome: Progressing     Problem: Respiratory - Adult  Goal: Achieves optimal ventilation and oxygenation  Outcome: Progressing  Flowsheets (Taken 1/14/2025 0746)  Achieves optimal ventilation and oxygenation: Assess for changes in respiratory status     Problem: Cardiovascular - Adult  Goal: Maintains optimal cardiac output and hemodynamic stability  Outcome: Progressing  Flowsheets (Taken 1/14/2025 0746)  Maintains optimal cardiac output and hemodynamic stability: Monitor blood pressure and heart rate     Problem: Musculoskeletal - Adult  Goal: Return mobility to safest level of function  Outcome: Progressing  Flowsheets (Taken 1/14/2025 0746)  Return Mobility to Safest Level of Function: Assess patient stability and activity tolerance for standing, transferring and ambulating with or without assistive devices  Goal: Return ADL status to a safe level of function  Outcome: Progressing  Flowsheets (Taken 1/14/2025 0746)  Return ADL Status to a Safe Level of Function: Assist and instruct patient to increase activity and self care as

## 2025-01-14 NOTE — DISCHARGE INSTRUCTIONS
Discharge Instructions      Patient Instructions:   Home  Therapy orders: PT, OT, and Nursing     Discharge lab work: none  Code status: Full Code   Activity: activity as tolerated  Diet: ADULT ORAL NUTRITION SUPPLEMENT; Lunch, Dinner; Wound Healing Oral Supplement  ADULT DIET; Regular; 4 carb choices (60 gm/meal); No Added Salt (3-4 gm); NO- Lasagna, milk, vegetable blends, squasg    Wound Care: as directed  Equipment: as per therapy

## 2025-01-14 NOTE — PROGRESS NOTES
Received home health order. MSW sent referral to [facility] (fax: [phone]). Patient already current so [facility] will resume at discharge. Will notify [facility] when patient has discharged.     Electronically signed by ARPAN Patino on 1/14/25 at 9:51 AM CST

## 2025-01-14 NOTE — PROGRESS NOTES
Vicki S, APRN - CNP, 40 mg at 01/14/25 0744    vitamin D (ERGOCALCIFEROL) capsule 50,000 Units, 50,000 Units, Oral, Once per day on Monday Thursday, MichaelVicki APRN - CNP, 50,000 Units at 01/13/25 0810    acetaminophen (TYLENOL) tablet 650 mg, 650 mg, Oral, Q4H PRN, Matt Nice MD, 650 mg at 01/13/25 1549    polyethylene glycol (GLYCOLAX) packet 17 g, 17 g, Oral, Daily PRN, Matt Nice MD    insulin lispro (HUMALOG,ADMELOG) injection vial 0-4 Units, 0-4 Units, SubCUTAneous, 4x Daily AC & HS, Matt Nice MD, 1 Units at 01/13/25 1200    miconazole (MICOTIN) 2 % powder, , Topical, BID, Matt Nice MD, Given at 01/12/25 1049    multivitamin 1 tablet, 1 tablet, Oral, Daily, Matt Nice MD, 1 tablet at 01/14/25 0744    loperamide (IMODIUM) capsule 2 mg, 2 mg, Oral, 4x Daily PRN, Matt Nice MD, 2 mg at 01/14/25 0051    Allergies:  Bacitracin, Mupirocin, Neomycin, Bacitracin-polymyxin b, Compazine [prochlorperazine], Adhesive tape, and Bacitracin-pramoxine hcl    Social History:   TOBACCO:   reports that she has been smoking cigarettes. She has never used smokeless tobacco.  ETOH:   reports that she does not currently use alcohol.    Family History:       Problem Relation Age of Onset    Cancer Brother     Hypertension Other          PHYSICAL EXAM:  /66   Pulse 89   Temp 97.3 °F (36.3 °C) (Temporal)   Resp 16   Ht 1.651 m (5' 5\")   Wt 60.7 kg (133 lb 14.4 oz)   SpO2 96%   BMI 22.28 kg/m²     Constitutional - well developed, well nourished.   Eyes - conjunctiva normal.   Ear, nose, throat - No scars, masses, or lesions over external nose or ears, no atrophy of tongue  Neck-symmetric, no masses noted, no jugular vein distension  Respiration- chest wall appears symmetric, good expansion,   normal effort without use of accessory muscles  Musculoskeletal - no significant wasting of muscles noted, no bony deformities  Extremities-no clubbing, cyanosis or edema  Skin - warm, dry, and  Patient requires assist with sit to stand from lower surfaces.  Verbal cues to lean forward and push with arms.  Worked on bending forward in a seated position to over come the fear of falling.   Treatment Diagnosis INTERFERENCE WTIH ACTIVITY   History DM, HTN, ANXIETY, RECENT HIP NAILING, DVT, UTI   Discharge Recommendations Continue to assess pending progress   Activity Tolerance   Activity Tolerance Patient tolerated treatment well   Physical Therapy Plan   General Plan  minutes of therapy at least 5 out of 7 days a week   Therapy Duration 4 Weeks   Current Treatment Recommendations Strengthening;ROM;Balance training;Functional mobility training;Transfer training;Endurance training;Gait training;Wheelchair mobility training;Stair training;Home exercise program;Safety education & training;Patient/Caregiver education & training;Equipment evaluation, education, & procurement;Therapeutic activities   Safety Devices   Type of Devices Bed alarm in place;Call light within reach;Left in bed            Electronically signed by Keyonna Oswald PTA on 1/10/2025 at 12:25 PM                                  RECORD REVIEW: Previous medical records, medications were reviewed at today's visit    IMPRESSION:   1.  Sepsis-complete antibiotics  2.  History of DVT-on Eliquis  3.  Diabetes-reduce insulin due to hypoglycemia  4.  Hypothyroidism-Synthroid  5.  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  Hypertension-meds discontinued due to low blood pressure  7.  GI-PPI/bowel regimen  8.  Vitamin D deficiency-on vitamin D  9.  Urinary incontinence/diverticulum of bladder-monitor  10.  Diarrhea-Imodium as needed  11.  PT/OT    Increase midodrine and DC spironolactone/metoprolol with low blood pressure    Team conference with PT/OT/speech/nursing/Care coordinator to review in depth patients care and discharge planning. At least 35 minutes spent coordinating care for patient >50% of time spent in coordination of care.      WC 20 ft

## 2025-01-14 NOTE — CARE COORDINATION
[facility] Inpatient Rehabilitation Unit  Test for Patient Foley in the Areas of Transfers/Ambulation    Ambulation/Transfers   Independent ambulation in room with assistive device:  Yes     -Device Type:  Rolling walker  Independent transfers from wheelchair to surface in room:  Yes     Bathroom Transfers  Independent transfers to toilet: Yes   Independent transfers for shower:  No     Ambulation in hallway  Patient able to ambulate in hallway with device:   No          Inpatient Rehabilitation Nursing and Therapies feel as though the patient qualifies for an acute rehabilitation test for independence in the areas of transfers and ambulation prior to discharging from [facility].  This test for independence in the areas of transfers and ambulation must be agreed upon by the patient's physician, nurse, and therapists.        Nurse Approval:  Electronically signed by Clarita Saldana LPN on 1/14/2025 at 10:08 AM     Physical Therapist Approval:  Electronically signed by Dewey Simpson PT on 1/14/25 at 9:39 AM CST      Occupational Therapist Approval: Electronically signed by DIANA Hernandez on 1/14/2025 at 9:16 AM

## 2025-01-14 NOTE — PROGRESS NOTES
Occupational Therapy  Facility/Department: [facility] 8 REHAB UNIT  Rehabilitation Occupational Therapy Daily Treatment Note    Date: 25  Patient Name: Jacqueline Richardson       Room: 0818/818-02  MRN: [id number]  Account: [id number]   : 1950  (74 y.o.) Gender: female        Diagnosis: (P) Sepsis, recent R hip fx with nailing in oct 2024 with PWB precations  Additional Pertinent Hx: (P) DM, HTN, tobacco abuse, hypothyroid, anxiety, arthritis, hip fx s/p nailing 9/15/24, post-op DVT,  UTI 2 weeks ago with sepsis, and acquired diverticulum of bladder    Treatment Diagnosis: (P) Sepsis, recent R hip fx with nailing in oct 2024 with PWB precations   Past Medical History:  has a past medical history of Acquired diverticulum of bladder, Diabetes (HCC), Hypertension, Smoker, and Thyroid disease.  Past Surgical History:   has a past surgical history that includes hip surgery (Right, 09/15/2024); ep device procedure (N/A, 2024); Appendectomy; Cholecystectomy; Hysterectomy; Pancreatectomy; Splenectomy; and Tonsillectomy.     25 1345   Restrictions/Precautions   Activity Level Up Ad Rea   General   Additional Pertinent Hx DM, HTN, tobacco abuse, hypothyroid, anxiety, arthritis, hip fx s/p nailing 9/15/24, post-op DVT,  UTI 2 weeks ago with sepsis, and acquired diverticulum of bladder   Diagnosis Sepsis, recent R hip fx with nailing in oct 2024 with PWB precations   Subjective   Subjective Pt states she has difficulty going from sit -> stand with shoes on and requested socks only.   Balance   Sitting Balance Independent   Standing Balance Modified independent    Functional Mobility   Functional - Mobility Device Rolling Walker   Activity Retrieve items;Transport items;Other   Assist Level Modified independent    Functional Mobility Comments reaching acts in simulated home environment   OT Exercises   Exercise Treatment Flexbar for wrist ex's   Motor Control/Coordination bottle cap board , B hands d/t fatigue, extra

## 2025-01-15 LAB
GLUCOSE BLD-MCNC: 118 MG/DL (ref 70–99)
GLUCOSE BLD-MCNC: 164 MG/DL (ref 70–99)
GLUCOSE BLD-MCNC: 210 MG/DL (ref 70–99)
GLUCOSE BLD-MCNC: 361 MG/DL (ref 70–99)
GLUCOSE BLD-MCNC: 57 MG/DL (ref 70–99)
PERFORMED ON: ABNORMAL

## 2025-01-15 PROCEDURE — 94760 N-INVAS EAR/PLS OXIMETRY 1: CPT

## 2025-01-15 PROCEDURE — 99232 SBSQ HOSP IP/OBS MODERATE 35: CPT | Performed by: PSYCHIATRY & NEUROLOGY

## 2025-01-15 PROCEDURE — 82962 GLUCOSE BLOOD TEST: CPT

## 2025-01-15 PROCEDURE — 1180000000 HC REHAB R&B

## 2025-01-15 PROCEDURE — 97110 THERAPEUTIC EXERCISES: CPT

## 2025-01-15 PROCEDURE — 97535 SELF CARE MNGMENT TRAINING: CPT

## 2025-01-15 PROCEDURE — 6370000000 HC RX 637 (ALT 250 FOR IP): Performed by: PSYCHIATRY & NEUROLOGY

## 2025-01-15 PROCEDURE — 6370000000 HC RX 637 (ALT 250 FOR IP): Performed by: NURSE PRACTITIONER

## 2025-01-15 PROCEDURE — 97116 GAIT TRAINING THERAPY: CPT

## 2025-01-15 PROCEDURE — 97530 THERAPEUTIC ACTIVITIES: CPT

## 2025-01-15 RX ADMIN — Medication 1 TABLET: at 13:10

## 2025-01-15 RX ADMIN — PANCRELIPASE LIPASE, PANCRELIPASE PROTEASE, PANCRELIPASE AMYLASE 5000 UNITS: 5000; 17000; 24000 CAPSULE, DELAYED RELEASE ORAL at 09:03

## 2025-01-15 RX ADMIN — ACETAMINOPHEN 650 MG: 325 TABLET ORAL at 09:03

## 2025-01-15 RX ADMIN — PANCRELIPASE LIPASE, PANCRELIPASE PROTEASE, PANCRELIPASE AMYLASE 5000 UNITS: 5000; 17000; 24000 CAPSULE, DELAYED RELEASE ORAL at 18:08

## 2025-01-15 RX ADMIN — Medication 1 TABLET: at 20:30

## 2025-01-15 RX ADMIN — ACETAMINOPHEN 650 MG: 325 TABLET ORAL at 20:30

## 2025-01-15 RX ADMIN — MIDODRINE HYDROCHLORIDE 10 MG: 10 TABLET ORAL at 18:08

## 2025-01-15 RX ADMIN — THERA TABS 1 TABLET: TAB at 09:03

## 2025-01-15 RX ADMIN — PANTOPRAZOLE SODIUM 40 MG: 40 TABLET, DELAYED RELEASE ORAL at 09:03

## 2025-01-15 RX ADMIN — PANCRELIPASE LIPASE, PANCRELIPASE PROTEASE, PANCRELIPASE AMYLASE 5000 UNITS: 5000; 17000; 24000 CAPSULE, DELAYED RELEASE ORAL at 13:10

## 2025-01-15 RX ADMIN — MIDODRINE HYDROCHLORIDE 10 MG: 10 TABLET ORAL at 09:03

## 2025-01-15 RX ADMIN — APIXABAN 5 MG: 5 TABLET, FILM COATED ORAL at 20:30

## 2025-01-15 RX ADMIN — MIDODRINE HYDROCHLORIDE 10 MG: 10 TABLET ORAL at 13:10

## 2025-01-15 RX ADMIN — LEVOTHYROXINE SODIUM 100 MCG: 0.05 TABLET ORAL at 05:28

## 2025-01-15 RX ADMIN — Medication 400 MG: at 09:03

## 2025-01-15 RX ADMIN — Medication 1 TABLET: at 09:03

## 2025-01-15 RX ADMIN — LOPERAMIDE HYDROCHLORIDE 2 MG: 2 CAPSULE ORAL at 09:04

## 2025-01-15 RX ADMIN — APIXABAN 5 MG: 5 TABLET, FILM COATED ORAL at 09:03

## 2025-01-15 ASSESSMENT — PAIN SCALES - GENERAL
PAINLEVEL_OUTOF10: 0
PAINLEVEL_OUTOF10: 4
PAINLEVEL_OUTOF10: 3

## 2025-01-15 ASSESSMENT — PAIN DESCRIPTION - DESCRIPTORS
DESCRIPTORS: ACHING;DISCOMFORT
DESCRIPTORS: ACHING;BURNING;STABBING

## 2025-01-15 ASSESSMENT — PAIN - FUNCTIONAL ASSESSMENT: PAIN_FUNCTIONAL_ASSESSMENT: ACTIVITIES ARE NOT PREVENTED

## 2025-01-15 ASSESSMENT — PAIN DESCRIPTION - ORIENTATION: ORIENTATION: RIGHT

## 2025-01-15 ASSESSMENT — PAIN DESCRIPTION - LOCATION
LOCATION: HEAD
LOCATION: HIP

## 2025-01-15 NOTE — PROGRESS NOTES
Facility/Department: [facility] REHAB UNIT  Occupational Therapy     Name: Jacqueline Richardson  : 1950  MRN: [id number]  Date of Service: 1/15/2025    Discharge Recommendations:  Home with Home health OT, Home with assist PRN          Patient Diagnosis(es): The primary encounter diagnosis was Sepsis, due to unspecified organism, unspecified whether acute organ dysfunction present (HCC). A diagnosis of Pain of right hip joint was also pertinent to this visit.  Past Medical History:  has a past medical history of Acquired diverticulum of bladder, Diabetes (HCC), Hypertension, Smoker, and Thyroid disease.  Past Surgical History:  has a past surgical history that includes hip surgery (Right, 09/15/2024); ep device procedure (N/A, 2024); Appendectomy; Cholecystectomy; Hysterectomy; Pancreatectomy; Splenectomy; and Tonsillectomy.    Treatment Diagnosis: Sepsis, recent R hip fx with nailing in oct 2024 with PWB precations      Assessment   Performance deficits / Impairments: Decreased functional mobility ;Decreased strength;Decreased endurance;Decreased high-level IADLs  Treatment Diagnosis: Sepsis, recent R hip fx with nailing in oct 2024 with PWB precations  Prognosis: Good  History: DM, HTN, tobacco abuse, hypothyroid, anxiety, arthritis, hip fx s/p nailing 9/15/24, post-op DVT,  UTI 2 weeks ago with sepsis, and acquired diverticulum of bladder  Activity Tolerance  Activity Tolerance: Patient Tolerated treatment well            Plan   Occupational Therapy Plan  Specific Instructions for Next Treatment: standing tolerance, endurance, functional mobility  Current Treatment Recommendations: Strengthening, Functional mobility training, Endurance training, Home management training     Restrictions  Restrictions/Precautions  Restrictions/Precautions: Fall Risk, Weight Bearing  Activity Level: Up Ad Rea  Lower Extremity Weight Bearing Restrictions  Right Lower Extremity Weight Bearing: Weight Bearing As Tolerated  Partial

## 2025-01-15 NOTE — PROGRESS NOTES
Name: Jacqueline Richardson  MRN: [id number]  Date of Service:  1/15/2025       01/15/25 1430   Restrictions/Precautions   Restrictions/Precautions Fall Risk;Weight Bearing   Activity Level Up Ad Rea   Lower Extremity Weight Bearing Restrictions   Right Lower Extremity Weight Bearing Weight Bearing As Tolerated   Left Lower Extremity Weight Bearing Weight Bearing As Tolerated   Position Activity Restriction   Other Position/Activity Restrictions now WBAT per Dr Bowen per admit sheet   General   Chart Reviewed Yes   Patient assessed for rehabilitation services? Yes   Additional Pertinent Hx DM, HTN, ANXIETY, RECENT HIP NAILING, DVT, UTI   Family/Caregiver Present No   Diagnosis SEPSIS D/T UTI; STAGE 2 PRESSURE ULCER SACRUM; RECENT RIGHT HIP FX S/P NAILING   Follows Commands WFL   Subjective   Subjective Pt laying in bed, agrees to participate in therapy.   PT Exercises   Exercise Treatment Pt performed aspects of supine BLE ther-ex HEP   Activity Tolerance   Activity Tolerance Patient limited by fatigue;Patient tolerated treatment well;Patient limited by endurance   Assessment   Assessment Pt reports she is tired this afternoon- plan to perform car transfer and stair training in morning tomorrow. Pt reports slight discomfort, in R hip w/ supine ther-ex- mostly reports BLE are heavy/fatigue somewhat quickly (requires leg  for some).   Discharge Recommendations Continue to assess pending progress;Home with Home health PT;Home with assist PRN   Education   Education Given To Patient   Education Provided Home Exercise Program   Education Provided Comments Sitting and supine BLE ther-ex HEP   Education Method Verbal;Printed Information/Hand-outs   Education Outcome Verbalized understanding;Demonstrated understanding;Continued education needed   Safety Devices   Type of Devices Patient at risk for falls;Left in bed;Bed alarm in place;Call light within reach           Electronically signed by Yady Koenig PTA on 1/15/2025  at 4:35 PM

## 2025-01-15 NOTE — PROGRESS NOTES
Facility/Department: [facility] REHAB UNIT  Occupational Therapy     Name: Jacqueline Richardson  : 1950  MRN: [id number]  Date of Service: 1/15/2025    Discharge Recommendations:  Home with Home health OT, Home with assist PRN          Patient Diagnosis(es): The primary encounter diagnosis was Sepsis, due to unspecified organism, unspecified whether acute organ dysfunction present (HCC). A diagnosis of Pain of right hip joint was also pertinent to this visit.  Past Medical History:  has a past medical history of Acquired diverticulum of bladder, Diabetes (HCC), Hypertension, Smoker, and Thyroid disease.  Past Surgical History:  has a past surgical history that includes hip surgery (Right, 09/15/2024); ep device procedure (N/A, 2024); Appendectomy; Cholecystectomy; Hysterectomy; Pancreatectomy; Splenectomy; and Tonsillectomy.    Treatment Diagnosis: Sepsis, recent R hip fx with nailing in oct 2024 with PWB precations      Assessment   Performance deficits / Impairments: Decreased functional mobility ;Decreased strength;Decreased endurance;Decreased high-level IADLs  Treatment Diagnosis: Sepsis, recent R hip fx with nailing in oct 2024 with PWB precations  Prognosis: Good  History: DM, HTN, tobacco abuse, hypothyroid, anxiety, arthritis, hip fx s/p nailing 9/15/24, post-op DVT,  UTI 2 weeks ago with sepsis, and acquired diverticulum of bladder  Activity Tolerance  Activity Tolerance: Patient Tolerated treatment well            Plan   Occupational Therapy Plan  Specific Instructions for Next Treatment: standing tolerance, endurance, functional mobility  Current Treatment Recommendations: Strengthening, Functional mobility training, Endurance training, Home management training     Restrictions  Restrictions/Precautions  Restrictions/Precautions: Fall Risk, Weight Bearing  Activity Level: Up Ad Rea  Lower Extremity Weight Bearing Restrictions  Right Lower Extremity Weight Bearing: Weight Bearing As Tolerated  Partial

## 2025-01-15 NOTE — PROGRESS NOTES
Name: Jacqueline Richardson  MRN: [id number]  Date of Service:  1/15/2025          Electronically signed by Yady Koenig PTA on 1/15/2025 at 2:29 PM

## 2025-01-15 NOTE — PLAN OF CARE
hemodynamic stability: Monitor blood pressure and heart rate     Problem: Cardiovascular - Adult  Goal: Absence of cardiac dysrhythmias or at baseline  Outcome: Progressing     Problem: Musculoskeletal - Adult  Goal: Return mobility to safest level of function  1/14/2025 2141 by Sarahi Issa LPN  Outcome: Progressing  Flowsheets (Taken 1/14/2025 2133)  Return Mobility to Safest Level of Function: Assess patient stability and activity tolerance for standing, transferring and ambulating with or without assistive devices  1/14/2025 0940 by Clarita Chisholm LPN  Outcome: Progressing  Flowsheets (Taken 1/14/2025 0746)  Return Mobility to Safest Level of Function: Assess patient stability and activity tolerance for standing, transferring and ambulating with or without assistive devices     Problem: Musculoskeletal - Adult  Goal: Maintain proper alignment of affected body part  Outcome: Progressing     Problem: Musculoskeletal - Adult  Goal: Return ADL status to a safe level of function  1/14/2025 2141 by Sarahi Issa LPN  Outcome: Progressing  Flowsheets (Taken 1/14/2025 2133)  Return ADL Status to a Safe Level of Function: Assist and instruct patient to increase activity and self care as tolerated  1/14/2025 0940 by Clarita Chisholm LPN  Outcome: Progressing  Flowsheets (Taken 1/14/2025 0746)  Return ADL Status to a Safe Level of Function: Assist and instruct patient to increase activity and self care as tolerated     Problem: Genitourinary - Adult  Goal: Absence of urinary retention  1/14/2025 2141 by Sarahi Issa LPN  Outcome: Progressing  1/14/2025 0940 by Clarita Chisholm LPN  Outcome: Progressing  Flowsheets (Taken 1/14/2025 0746)  Absence of urinary retention: Assess patient’s ability to void and empty bladder     Problem: ABCDS Injury Assessment  Goal: Absence of physical injury  1/14/2025 2141 by Sarahi Issa LPN  Outcome: Progressing  1/14/2025 0940 by Clarita Chisholm LPN  Outcome:  Progressing     Problem: Skin/Tissue Integrity  Goal: Absence of new skin breakdown  Description: 1.  Monitor for areas of redness and/or skin breakdown  2.  Assess vascular access sites hourly  3.  Every 4-6 hours minimum:  Change oxygen saturation probe site  4.  Every 4-6 hours:  If on nasal continuous positive airway pressure, respiratory therapy assess nares and determine need for appliance change or resting period.  1/14/2025 2141 by Sarahi Issa LPN  Outcome: Progressing  1/14/2025 0940 by Clarita Chisholm LPN  Outcome: Progressing     Problem: Nutrition Deficit:  Goal: Optimize nutritional status  Outcome: Progressing     Problem: Skin/Tissue Integrity - Adult  Goal: Skin integrity remains intact  1/14/2025 2141 by Sarahi Issa LPN  Outcome: Progressing  1/14/2025 0940 by Clarita Chisholm LPN  Outcome: Progressing  Flowsheets (Taken 1/14/2025 0929)  Skin Integrity Remains Intact: Monitor for areas of redness and/or skin breakdown     Problem: Metabolic/Fluid and Electrolytes - Adult  Goal: Electrolytes maintained within normal limits  1/14/2025 2141 by Sarahi Issa LPN  Outcome: Progressing  1/14/2025 0940 by Clarita Chisholm LPN  Outcome: Progressing  Flowsheets (Taken 1/14/2025 0746)  Electrolytes maintained within normal limits: Monitor labs and assess patient for signs and symptoms of electrolyte imbalances     Problem: Metabolic/Fluid and Electrolytes - Adult  Goal: Hemodynamic stability and optimal renal function maintained  1/14/2025 2141 by Sarahi Issa LPN  Outcome: Progressing  1/14/2025 0940 by Clarita Chisholm LPN  Outcome: Progressing  Flowsheets (Taken 1/14/2025 0746)  Hemodynamic stability and optimal renal function maintained: Monitor labs and assess for signs and symptoms of volume excess or deficit     Problem: Metabolic/Fluid and Electrolytes - Adult  Goal: Glucose maintained within prescribed range  1/14/2025 2141 by Sarahi Issa LPN  Outcome: Progressing  1/14/2025

## 2025-01-15 NOTE — PROGRESS NOTES
Patient:   Jacqueline Richardson  MR#:    [id number]   Room:    [facility]818-   YOB: 1950  Date of Progress Note: 1/15/2025  Time of Note                           7:03 AM  Consulting Physician:   Matt Nice M.D.  Attending Physician:  Matt Nice MD     Chief complaint Sepsis    S:This 74 y.o. female  with history of DM, HTN, tobacco abuse, hypothyroid, anxiety, arthritis, hip fx s/p nailing 9/15/24, post-op DVT, UTI 2 weeks ago with sepsis, and acquired diverticulum of bladder. She presented to [facility] ER on 12/31/24 with c/o three days of weakness, chills and some diarrhea. Work-up in ER Na 139, potassium 3.8, bun 16, creat 0.8, mag 1.5, lactic 3.7 with repeat of 3.2, BNP 1422, troponin 36,and 33, alk phos 384, wbc 13.3, hgb 12.6, hct 38., resp panel neg, urine 2 + bacteria, 1 wbc trace leukocyte. CXR with right basilar atelectasis. Fluid bolus of 1000 in ED. Not sepsis bolus due to edema and recent large amount of swelling from last admission . She was admitted to the Hospitalist service for sepsis. She was placed on Maxipime and vancomycin. Patient was also noted to have a stage 2 pressure ulcer to her sacrum. Patient's Lactic has returned to normal. Blood cultures, urine culture and MRSA probe are negative thus far. Changed antibiotics to Cefepime only pending final cultures. Patient remains weak overall. She is participating in both PT/OT. She is felt to need a stay on Rehab to work towards her goal of returning home with her . She is now felt ready to start the Rehab program.    Off oxygen.  Still some fatigue at times.  No new complaints.  Overall feeling about the same.    REVIEW OF SYSTEMS:  Constitutional: No fevers No chills  Neck:No stiffness  Respiratory: No shortness of breath  Cardiovascular: No chest pain No palpitations  Gastrointestinal: No abdominal pain    Genitourinary: No Dysuria  Neurological: No headache, no confusion    Past Medical History:      Diagnosis Date     placement   Exercises   Comments seated pamela le therex with tband x 10 reps  fatigue noted   Patient Goals    Patient Goals  go home   Short Term Goals   Time Frame for Short Term Goals 2 WEEKS   Short Term Goal 1 BED MOBILITY MIN A WITH RAILS   Short Term Goal 2 TF SURFACE TO SURFACE MIN A WITH A.D.   Short Term Goal 3 AMBULATE 15 FT WITH RW MIN A   Short Term Goal 4 UP/DOWN 4 STEPS 1-2 RAILS MOD A   Short Term Goal 5 CAR TF MOD A WITH A.D.   Long Term Goals   Time Frame for Long Term Goals  4 WEEKS   Long Term Goal 1 INDEP BED MOB WITH RAILS   Long Term Goal 2 TF SURFACE TO SURFACE CGA WITH A.D.   Long Term Goal 3 AMBULATE 25 FT WITH RW MIN A   Long Term Goal 4 UP/DOWN 4 STEPS 1-2 RIALS MIN A   Long Term Goal 5 CAR TF MIN A WITH A.D.   Conditions Requiring Skilled Therapeutic Intervention   Body Structures, Functions, Activity Limitations Requiring Skilled Therapeutic Intervention Decreased functional mobility ;Decreased ADL status;Decreased strength;Decreased safe awareness;Decreased balance;Decreased posture   Assessment pt able to ascend steps better this afternoon side step method up and down with pamela hand holding rt rail  still has difficulty stepping up but less A needed this afternoon   Discharge Recommendations Continue to assess pending progress   Activity Tolerance   Activity Tolerance Patient tolerated treatment well   Activity Tolerance Comments fatigues easy   Physical Therapy Plan   Current Treatment Recommendations Strengthening;ROM;Balance training;Functional mobility training;Transfer training;Endurance training;Gait training;Wheelchair mobility training;Stair training;Home exercise program;Safety education & training;Patient/Caregiver education & training;Equipment evaluation, education, & procurement;Therapeutic activities   PT Plan of Care   Tuesday X   Safety Devices   Type of Devices Call light within reach;Gait belt;Left in bed;Sitter present   PT Whiteboard Notes   Therapy Whiteboard RE 1/15

## 2025-01-16 LAB
ALBUMIN SERPL-MCNC: 2 G/DL (ref 3.5–5.2)
ALP SERPL-CCNC: 361 U/L (ref 35–104)
ALT SERPL-CCNC: 30 U/L (ref 5–33)
ANION GAP SERPL CALCULATED.3IONS-SCNC: 15 MMOL/L (ref 7–19)
AST SERPL-CCNC: 51 U/L (ref 5–32)
BACTERIA #/AREA URNS HPF: NORMAL /HPF
BASOPHILS # BLD: 0.1 K/UL (ref 0–0.2)
BASOPHILS NFR BLD: 2.4 % (ref 0–1)
BILIRUB SERPL-MCNC: 1 MG/DL (ref 0.2–1.2)
BILIRUB UR QL STRIP: ABNORMAL
BUN SERPL-MCNC: 28 MG/DL (ref 8–23)
CALCIUM SERPL-MCNC: 7.2 MG/DL (ref 8.8–10.2)
CHLORIDE SERPL-SCNC: 112 MMOL/L (ref 98–111)
CLARITY UR: CLEAR
CO2 SERPL-SCNC: 17 MMOL/L (ref 22–29)
COLOR UR: ABNORMAL
CREAT SERPL-MCNC: 0.5 MG/DL (ref 0.5–0.9)
CRYSTALS URNS MICRO: NORMAL /HPF
EOSINOPHIL # BLD: 0.1 K/UL (ref 0–0.6)
EOSINOPHIL NFR BLD: 2.4 % (ref 0–5)
ERYTHROCYTE [DISTWIDTH] IN BLOOD BY AUTOMATED COUNT: 17.4 % (ref 11.5–14.5)
GLUCOSE BLD-MCNC: 169 MG/DL (ref 70–99)
GLUCOSE BLD-MCNC: 185 MG/DL (ref 70–99)
GLUCOSE BLD-MCNC: 272 MG/DL (ref 70–99)
GLUCOSE BLD-MCNC: 282 MG/DL (ref 70–99)
GLUCOSE SERPL-MCNC: 293 MG/DL (ref 70–99)
GLUCOSE UR STRIP.AUTO-MCNC: NEGATIVE MG/DL
HCT VFR BLD AUTO: 32.6 % (ref 37–47)
HGB BLD-MCNC: 10.5 G/DL (ref 12–16)
HGB UR STRIP.AUTO-MCNC: ABNORMAL MG/L
IMM GRANULOCYTES # BLD: 0 K/UL
KETONES UR STRIP.AUTO-MCNC: 15 MG/DL
LEUKOCYTE ESTERASE UR QL STRIP.AUTO: ABNORMAL
LYMPHOCYTES # BLD: 1.7 K/UL (ref 1.1–4.5)
LYMPHOCYTES NFR BLD: 34.3 % (ref 20–40)
MCH RBC QN AUTO: 35.2 PG (ref 27–31)
MCHC RBC AUTO-ENTMCNC: 32.2 G/DL (ref 33–37)
MCV RBC AUTO: 109.4 FL (ref 81–99)
MONOCYTES # BLD: 0.6 K/UL (ref 0–0.9)
MONOCYTES NFR BLD: 11.3 % (ref 0–10)
NEUTROPHILS # BLD: 2.4 K/UL (ref 1.5–7.5)
NEUTS SEG NFR BLD: 49 % (ref 50–65)
NITRITE UR QL STRIP.AUTO: NEGATIVE
PERFORMED ON: ABNORMAL
PH UR STRIP.AUTO: 6 [PH] (ref 5–8)
PLATELET # BLD AUTO: 370 K/UL (ref 130–400)
PMV BLD AUTO: 12.1 FL (ref 9.4–12.3)
POTASSIUM SERPL-SCNC: 4.4 MMOL/L (ref 3.5–5)
PROT SERPL-MCNC: 4.3 G/DL (ref 6.4–8.3)
PROT UR STRIP.AUTO-MCNC: ABNORMAL MG/DL
RBC # BLD AUTO: 2.98 M/UL (ref 4.2–5.4)
RBC #/AREA URNS HPF: NORMAL /HPF (ref 0–2)
SODIUM SERPL-SCNC: 144 MMOL/L (ref 136–145)
SP GR UR STRIP.AUTO: 1.03 (ref 1–1.03)
SQUAMOUS #/AREA URNS HPF: NORMAL /HPF
UROBILINOGEN UR STRIP.AUTO-MCNC: 1 E.U./DL
WBC # BLD AUTO: 5 K/UL (ref 4.8–10.8)
WBC #/AREA URNS HPF: NORMAL /HPF (ref 0–5)

## 2025-01-16 PROCEDURE — 81001 URINALYSIS AUTO W/SCOPE: CPT

## 2025-01-16 PROCEDURE — 85025 COMPLETE CBC W/AUTO DIFF WBC: CPT

## 2025-01-16 PROCEDURE — 97116 GAIT TRAINING THERAPY: CPT

## 2025-01-16 PROCEDURE — 97110 THERAPEUTIC EXERCISES: CPT

## 2025-01-16 PROCEDURE — 97530 THERAPEUTIC ACTIVITIES: CPT

## 2025-01-16 PROCEDURE — 1180000000 HC REHAB R&B

## 2025-01-16 PROCEDURE — 6370000000 HC RX 637 (ALT 250 FOR IP): Performed by: NURSE PRACTITIONER

## 2025-01-16 PROCEDURE — 6370000000 HC RX 637 (ALT 250 FOR IP): Performed by: PSYCHIATRY & NEUROLOGY

## 2025-01-16 PROCEDURE — 99232 SBSQ HOSP IP/OBS MODERATE 35: CPT | Performed by: PSYCHIATRY & NEUROLOGY

## 2025-01-16 PROCEDURE — 80053 COMPREHEN METABOLIC PANEL: CPT

## 2025-01-16 PROCEDURE — 94760 N-INVAS EAR/PLS OXIMETRY 1: CPT

## 2025-01-16 PROCEDURE — 82962 GLUCOSE BLOOD TEST: CPT

## 2025-01-16 PROCEDURE — 97535 SELF CARE MNGMENT TRAINING: CPT

## 2025-01-16 PROCEDURE — 36415 COLL VENOUS BLD VENIPUNCTURE: CPT

## 2025-01-16 RX ADMIN — Medication 400 MG: at 07:56

## 2025-01-16 RX ADMIN — ACETAMINOPHEN 650 MG: 325 TABLET ORAL at 10:07

## 2025-01-16 RX ADMIN — APIXABAN 5 MG: 5 TABLET, FILM COATED ORAL at 07:56

## 2025-01-16 RX ADMIN — MIDODRINE HYDROCHLORIDE 10 MG: 10 TABLET ORAL at 16:45

## 2025-01-16 RX ADMIN — LOPERAMIDE HYDROCHLORIDE 2 MG: 2 CAPSULE ORAL at 03:29

## 2025-01-16 RX ADMIN — INSULIN LISPRO 2 UNITS: 100 INJECTION, SOLUTION INTRAVENOUS; SUBCUTANEOUS at 21:24

## 2025-01-16 RX ADMIN — Medication 1 TABLET: at 07:56

## 2025-01-16 RX ADMIN — MIDODRINE HYDROCHLORIDE 10 MG: 10 TABLET ORAL at 07:56

## 2025-01-16 RX ADMIN — INSULIN LISPRO 1 UNITS: 100 INJECTION, SOLUTION INTRAVENOUS; SUBCUTANEOUS at 11:48

## 2025-01-16 RX ADMIN — PANCRELIPASE LIPASE, PANCRELIPASE PROTEASE, PANCRELIPASE AMYLASE 5000 UNITS: 5000; 17000; 24000 CAPSULE, DELAYED RELEASE ORAL at 07:56

## 2025-01-16 RX ADMIN — Medication 1 TABLET: at 21:24

## 2025-01-16 RX ADMIN — MIDODRINE HYDROCHLORIDE 10 MG: 10 TABLET ORAL at 11:48

## 2025-01-16 RX ADMIN — Medication 1 TABLET: at 16:45

## 2025-01-16 RX ADMIN — PANTOPRAZOLE SODIUM 40 MG: 40 TABLET, DELAYED RELEASE ORAL at 07:56

## 2025-01-16 RX ADMIN — PANCRELIPASE LIPASE, PANCRELIPASE PROTEASE, PANCRELIPASE AMYLASE 5000 UNITS: 5000; 17000; 24000 CAPSULE, DELAYED RELEASE ORAL at 16:45

## 2025-01-16 RX ADMIN — THERA TABS 1 TABLET: TAB at 07:56

## 2025-01-16 RX ADMIN — PANCRELIPASE LIPASE, PANCRELIPASE PROTEASE, PANCRELIPASE AMYLASE 5000 UNITS: 5000; 17000; 24000 CAPSULE, DELAYED RELEASE ORAL at 11:48

## 2025-01-16 RX ADMIN — ERGOCALCIFEROL 50000 UNITS: 1.25 CAPSULE ORAL at 07:56

## 2025-01-16 RX ADMIN — INSULIN LISPRO 2 UNITS: 100 INJECTION, SOLUTION INTRAVENOUS; SUBCUTANEOUS at 08:00

## 2025-01-16 RX ADMIN — APIXABAN 5 MG: 5 TABLET, FILM COATED ORAL at 21:24

## 2025-01-16 RX ADMIN — LEVOTHYROXINE SODIUM 100 MCG: 0.05 TABLET ORAL at 05:23

## 2025-01-16 ASSESSMENT — PAIN SCALES - GENERAL
PAINLEVEL_OUTOF10: 3
PAINLEVEL_OUTOF10: 0

## 2025-01-16 ASSESSMENT — PAIN - FUNCTIONAL ASSESSMENT: PAIN_FUNCTIONAL_ASSESSMENT: ACTIVITIES ARE NOT PREVENTED

## 2025-01-16 ASSESSMENT — PAIN DESCRIPTION - DESCRIPTORS: DESCRIPTORS: ACHING;DISCOMFORT

## 2025-01-16 ASSESSMENT — PAIN DESCRIPTION - LOCATION: LOCATION: HEAD

## 2025-01-16 NOTE — PLAN OF CARE
maintained  Outcome: Progressing  Goal: Glucose maintained within prescribed range  Outcome: Progressing     Problem: Pain  Goal: Verbalizes/displays adequate comfort level or baseline comfort level  Outcome: Progressing     Problem: Chronic Conditions and Co-morbidities  Goal: Patient's chronic conditions and co-morbidity symptoms are monitored and maintained or improved  Outcome: Progressing     Problem: Discharge Planning  Goal: Discharge to home or other facility with appropriate resources  Outcome: Progressing     Problem: Safety - Adult  Goal: Free from fall injury  Outcome: Progressing     Problem: Respiratory - Adult  Goal: Achieves optimal ventilation and oxygenation  Outcome: Progressing     Problem: Cardiovascular - Adult  Goal: Maintains optimal cardiac output and hemodynamic stability  Outcome: Progressing     Problem: Cardiovascular - Adult  Goal: Absence of cardiac dysrhythmias or at baseline  Outcome: Progressing     Problem: Musculoskeletal - Adult  Goal: Return mobility to safest level of function  Outcome: Progressing     Problem: Musculoskeletal - Adult  Goal: Maintain proper alignment of affected body part  Outcome: Progressing     Problem: Musculoskeletal - Adult  Goal: Return ADL status to a safe level of function  Outcome: Progressing     Problem: Genitourinary - Adult  Goal: Absence of urinary retention  Outcome: Progressing     Problem: ABCDS Injury Assessment  Goal: Absence of physical injury  Outcome: Progressing     Problem: Skin/Tissue Integrity  Goal: Absence of new skin breakdown  Description: 1.  Monitor for areas of redness and/or skin breakdown  2.  Assess vascular access sites hourly  3.  Every 4-6 hours minimum:  Change oxygen saturation probe site  4.  Every 4-6 hours:  If on nasal continuous positive airway pressure, respiratory therapy assess nares and determine need for appliance change or resting period.  Outcome: Progressing     Problem: Nutrition Deficit:  Goal: Optimize

## 2025-01-16 NOTE — PROGRESS NOTES
Patient:   Jacqueline Richardson  MR#:    [id number]   Room:    [facility]818-   YOB: 1950  Date of Progress Note: 1/16/2025  Time of Note                           7:27 AM  Consulting Physician:   aMtt Nice M.D.  Attending Physician:  Matt Nice MD     Chief complaint Sepsis    S:This 74 y.o. female  with history of DM, HTN, tobacco abuse, hypothyroid, anxiety, arthritis, hip fx s/p nailing 9/15/24, post-op DVT, UTI 2 weeks ago with sepsis, and acquired diverticulum of bladder. She presented to [facility] ER on 12/31/24 with c/o three days of weakness, chills and some diarrhea. Work-up in ER Na 139, potassium 3.8, bun 16, creat 0.8, mag 1.5, lactic 3.7 with repeat of 3.2, BNP 1422, troponin 36,and 33, alk phos 384, wbc 13.3, hgb 12.6, hct 38., resp panel neg, urine 2 + bacteria, 1 wbc trace leukocyte. CXR with right basilar atelectasis. Fluid bolus of 1000 in ED. Not sepsis bolus due to edema and recent large amount of swelling from last admission . She was admitted to the Hospitalist service for sepsis. She was placed on Maxipime and vancomycin. Patient was also noted to have a stage 2 pressure ulcer to her sacrum. Patient's Lactic has returned to normal. Blood cultures, urine culture and MRSA probe are negative thus far. Changed antibiotics to Cefepime only pending final cultures. Patient remains weak overall. She is participating in both PT/OT. She is felt to need a stay on Rehab to work towards her goal of returning home with her . She is now felt ready to start the Rehab program.    Off oxygen.  Still some fatigue at times.  Feeling overall well.  Still some dysuria.    REVIEW OF SYSTEMS:  Constitutional: No fevers No chills  Neck:No stiffness  Respiratory: No shortness of breath  Cardiovascular: No chest pain No palpitations  Gastrointestinal: No abdominal pain    Genitourinary: No Dysuria  Neurological: No headache, no confusion    Past Medical History:      Diagnosis Date    Acquired    Short Term Goals   Time Frame for Short Term Goals 2 WEEKS   Short Term Goal 1 BED MOBILITY MIN A WITH RAILS   Short Term Goal 2 TF SURFACE TO SURFACE MIN A WITH A.D.   Short Term Goal 3 AMBULATE 15 FT WITH RW MIN A   Short Term Goal 4 UP/DOWN 4 STEPS 1-2 RAILS MOD A   Short Term Goal 5 CAR TF MOD A WITH A.D.   Long Term Goals   Time Frame for Long Term Goals  4 WEEKS   Long Term Goal 1 INDEP BED MOB WITH RAILS   Long Term Goal 2 TF SURFACE TO SURFACE CGA WITH A.D.   Long Term Goal 3 AMBULATE 25 FT WITH RW MIN A   Long Term Goal 4 UP/DOWN 4 STEPS 1-2 RIALS MIN A   Long Term Goal 5 CAR TF MIN A WITH A.D.   Conditions Requiring Skilled Therapeutic Intervention   Body Structures, Functions, Activity Limitations Requiring Skilled Therapeutic Intervention Decreased functional mobility ;Decreased ADL status;Decreased strength;Decreased safe awareness;Decreased balance;Decreased posture   Assessment pt able to ascend steps better this afternoon side step method up and down with pamela hand holding rt rail  still has difficulty stepping up but less A needed this afternoon   Discharge Recommendations Continue to assess pending progress   Activity Tolerance   Activity Tolerance Patient tolerated treatment well   Activity Tolerance Comments fatigues easy   Physical Therapy Plan   Current Treatment Recommendations Strengthening;ROM;Balance training;Functional mobility training;Transfer training;Endurance training;Gait training;Wheelchair mobility training;Stair training;Home exercise program;Safety education & training;Patient/Caregiver education & training;Equipment evaluation, education, & procurement;Therapeutic activities   PT Plan of Care   Tuesday X   Safety Devices   Type of Devices Call light within reach;Gait belt;Left in bed;Sitter present   PT Whiteboard Notes   Therapy Whiteboard RE 1/15 sepsis, RLE WB 50% R femur fx, ORIF 9/2024      Electronically signed by Alona Mccullough PTA on 1/14/2025 at 4:30 PM

## 2025-01-16 NOTE — PLAN OF CARE
Problem: Chronic Conditions and Co-morbidities  Goal: Patient's chronic conditions and co-morbidity symptoms are monitored and maintained or improved  1/16/2025 1037 by Clarita Chisholm LPN  Outcome: Progressing  Flowsheets (Taken 1/16/2025 0806)  Care Plan - Patient's Chronic Conditions and Co-Morbidity Symptoms are Monitored and Maintained or Improved: Monitor and assess patient's chronic conditions and comorbid symptoms for stability, deterioration, or improvement  1/15/2025 2353 by Sarahi Issa LPN  Outcome: Progressing     Problem: Discharge Planning  Goal: Discharge to home or other facility with appropriate resources  1/16/2025 1037 by Clarita Chisholm LPN  Outcome: Progressing  Flowsheets (Taken 1/16/2025 0806)  Discharge to home or other facility with appropriate resources: Refer to discharge planning if patient needs post-hospital services based on physician order or complex needs related to functional status, cognitive ability or social support system  1/15/2025 2353 by Sarahi Issa LPN  Outcome: Progressing     Problem: Safety - Adult  Goal: Free from fall injury  1/16/2025 1037 by Clarita Chisholm LPN  Outcome: Progressing  1/15/2025 2353 by Sarahi Issa LPN  Outcome: Progressing     Problem: Respiratory - Adult  Goal: Achieves optimal ventilation and oxygenation  1/16/2025 1037 by Clarita Chisholm LPN  Outcome: Progressing  Flowsheets (Taken 1/16/2025 0806)  Achieves optimal ventilation and oxygenation: Assess for changes in respiratory status  1/15/2025 2353 by Sarahi Issa LPN  Outcome: Progressing     Problem: Cardiovascular - Adult  Goal: Maintains optimal cardiac output and hemodynamic stability  1/16/2025 1037 by Clarita Chisholm LPN  Outcome: Progressing  Flowsheets (Taken 1/16/2025 0806)  Maintains optimal cardiac output and hemodynamic stability: Monitor blood pressure and heart rate  1/15/2025 2353 by Sarahi Issa LPN  Outcome: Progressing  Goal: Absence of cardiac

## 2025-01-16 NOTE — PROGRESS NOTES
Name: Jacqueline Richardson  MRN: [id number]  Date of Service:  1/16/2025 01/16/25 1300   Restrictions/Precautions   Restrictions/Precautions Fall Risk;Weight Bearing   Activity Level Up Ad Rea   Lower Extremity Weight Bearing Restrictions   Right Lower Extremity Weight Bearing Weight Bearing As Tolerated   Left Lower Extremity Weight Bearing Weight Bearing As Tolerated   Position Activity Restriction   Other Position/Activity Restrictions now WBAT per Dr Bowen per admit sheet   General   Chart Reviewed Yes   Patient assessed for rehabilitation services? Yes   Additional Pertinent Hx DM, HTN, ANXIETY, RECENT HIP NAILING, DVT, UTI   Family/Caregiver Present No   Diagnosis SEPSIS D/T UTI; STAGE 2 PRESSURE ULCER SACRUM; RECENT RIGHT HIP FX S/P NAILING   Follows Commands WFL   Subjective   Subjective Pt going to BR, agrees to participate in therapy once she is finished.   Pain   Pre-Pain 1   Post-Pain 1   Pain Location Right;Hip  (Also B heels/top of feet)   Transfers   Sit to Stand Contact guard assistance;Minimal Assistance   Stand to Sit Stand by assistance   Ambulation   Surface Level tile   Device Rolling Walker   Assistance Supervision  (W/o shoes donned)   Quality of Gait reciprocal, intermittent downward gaze   Gait Deviations Decreased step length;Decreased step height   Distance 10', 15'   Comments Multiple L/R turns   Stairs/Curb   Stairs? Yes   Stairs   # Steps  3   Stairs Height 6\"   Rails Right ascending   Assistance Contact guard assistance;Minimal assistance  (CGA during asc/Min A during desc)   Comment somewhat sideways so BUE support on rail   Activity Tolerance   Activity Tolerance Patient limited by endurance;Patient tolerated treatment well   Assessment   Assessment Pt is able to asc/desc 3 6\" stairs w/ only R asc handrail by asc/desc somewhat sideways w/ BUE support- requiring CGA/Min A,.   Discharge Recommendations Continue to assess pending progress;Home with Home health PT;Home with assist PRN    Safety Devices   Type of Devices Gait belt;Left in chair;Chair alarm in place             Electronically signed by Yady Koenig PTA on 1/16/2025 at 3:09 PM

## 2025-01-16 NOTE — PROGRESS NOTES
Name: Jacqueline Richardson  MRN: [id number]  Date of Service:  1/16/2025 01/16/25 0900   Restrictions/Precautions   Restrictions/Precautions Fall Risk;Weight Bearing   Lower Extremity Weight Bearing Restrictions   Right Lower Extremity Weight Bearing Weight Bearing As Tolerated   Left Lower Extremity Weight Bearing Weight Bearing As Tolerated   Position Activity Restriction   Other Position/Activity Restrictions now WBAT per Dr Bowen per admit sheet   General   Chart Reviewed Yes   Patient assessed for rehabilitation services? Yes   Additional Pertinent Hx DM, HTN, ANXIETY, RECENT HIP NAILING, DVT, UTI   Family/Caregiver Present No   Diagnosis SEPSIS D/T UTI; STAGE 2 PRESSURE ULCER SACRUM; RECENT RIGHT HIP FX S/P NAILING   Follows Commands WFL   Subjective   Subjective Pt brought into gym by OT, agrees to participate in therapy.   Pain   Pre-Pain 2   Post-Pain 2   Pain Location Right;Hip  (And B heels/top of feet)   Transfers   Sit to Stand Contact guard assistance;Minimal Assistance   Stand to Sit Stand by assistance   Car Transfer Contact guard assistance;Maximum Assistance;2 Person Assistance  (W/ RW- Low height to simulate personal Chevy Impala: CGA to enter, then Max A X 2 to safely sit in w/c due to pt sliding down (not touching floor) due to difficulty/BLE weakness/anxiety, and lack of sufficient places to push up when standing up)   Comment Requires less assist once standing and w/o shoes donned   Ambulation   Surface Level tile   Device Rolling Walker   Assistance Stand by assistance;Contact guard assistance   Quality of Gait reciprocal, intermittent downward gaze   Gait Deviations Decreased step length;Decreased step height   Distance 10' X 2   Comments Multiple L/R turns  (w/ shoes donned)   Activity Tolerance   Activity Tolerance Other (comment);Patient limited by endurance;Patient limited by fatigue  (See note)   Assessment   Assessment Pt is able to enter car w/o difficulty requiring CGA, does require Max

## 2025-01-16 NOTE — PROGRESS NOTES
Facility/Department: [facility]  Occupational Therapy Treatment Note    Name: Jacqueline Richardson  : 1950  MRN: [id number]  Date of Service: 2025    Discharge Recommendations:  Home with Home health OT, Home with assist PRN          Patient Diagnosis(es): The primary encounter diagnosis was Sepsis, due to unspecified organism, unspecified whether acute organ dysfunction present (HCC). A diagnosis of Pain of right hip joint was also pertinent to this visit.  Past Medical History:  has a past medical history of Acquired diverticulum of bladder, Diabetes (HCC), Hypertension, Smoker, and Thyroid disease.  Past Surgical History:  has a past surgical history that includes hip surgery (Right, 09/15/2024); ep device procedure (N/A, 2024); Appendectomy; Cholecystectomy; Hysterectomy; Pancreatectomy; Splenectomy; and Tonsillectomy.    Treatment Diagnosis: Sepsis, recent R hip fx with nailing in oct 2024 with PWB precations    Assessment   Performance deficits / Impairments: Decreased functional mobility ;Decreased strength;Decreased endurance;Decreased high-level IADLs  Assessment: Pt requested to change heel protectors/compression socks. Pt completed AROM exercises.  Treatment Diagnosis: Sepsis, recent R hip fx with nailing in oct 2024 with PWB precations  Prognosis: Good  Activity Tolerance  Activity Tolerance: Patient Tolerated treatment well              Plan   Occupational Therapy Plan  Specific Instructions for Next Treatment: standing tolerance, endurance, functional mobility  Current Treatment Recommendations: Strengthening, Functional mobility training, Endurance training, Home management training     Restrictions  Restrictions/Precautions  Restrictions/Precautions: Fall Risk, Weight Bearing  Activity Level: Up Ad Rea  Lower Extremity Weight Bearing Restrictions  Right Lower Extremity Weight Bearing: Weight Bearing As Tolerated  Partial Weight Bearing Percentage Or Pounds: now WBAT per Dr Bowen on  (!) 99/51  MAP (Calculated): 67  BP Location: Left upper arm             Safety Devices  Type of Devices: Left in bed;Call light within reach (Left with )        ADL  Putting On/Taking Off Footwear: Maximum assistance  Putting On/Taking Off Footwear Skilled Clinical Factors: Donning/doffing compression socks     Activity Tolerance  Activity Tolerance: Patient limited by endurance;Patient tolerated treatment well     Transfers  Sit to stand: Minimal assistance  Stand to sit: Modified independent  Transfer Comments: Patient required asisstance today with sit>stand transfer from w/c       A/AROM Exercises: Completed AROM R shoulder/elbow exercises without weight. AAROM L shoulder/elbow exercises    OutComes Score    Balance  Sitting Balance: Independent  Standing Balance: Modified independent    Goals  Short Term Goals  Short Term Goal 1: MET  Short Term Goal 2: MET  Short Term Goal 3: MET  Short Term Goal 4: MET  Short Term Goal 5: MET  Short Term Goal 6: MET  Short Term Goal 7: MET  Long Term Goals  Time Frame for Long Term Goals : 2 weeks  Long Term Goal 1: MET  Long Term Goal 2: MET  Long Term Goal 3: MET  Long Term Goal 4: MET  Long Term Goal 5: MET  Additional Goals?: Yes     Therapy Time   Individual Concurrent Group Co-treatment   Time In 1400         Time Out 1445         Minutes 45         Timed Code Treatment Minutes: 45 Minutes       Electronically signed by WILD Gutierrez on 1/16/2025 at 3:13 PM

## 2025-01-16 NOTE — PROGRESS NOTES
Facility/Department: [facility] REH[facility] UNIT  Occupational Therapy Treatment Note    Name: Jacqueline Richardson  : 1950  MRN: [id number]  Date of Service: 2025    Discharge Recommendations:  Home with Home health OT, Home with assist PRN          Patient Diagnosis(es): The primary encounter diagnosis was Sepsis, due to unspecified organism, unspecified whether acute organ dysfunction present (HCC). A diagnosis of Pain of right hip joint was also pertinent to this visit.  Past Medical History:  has a past medical history of Acquired diverticulum of bladder, Diabetes (HCC), Hypertension, Smoker, and Thyroid disease.  Past Surgical History:  has a past surgical history that includes hip surgery (Right, 09/15/2024); ep device procedure (N/A, 2024); Appendectomy; Cholecystectomy; Hysterectomy; Pancreatectomy; Splenectomy; and Tonsillectomy.    Treatment Diagnosis: Sepsis, recent R hip fx with nailing in oct 2024 with PWB precations    Assessment   Performance deficits / Impairments: Decreased functional mobility ;Decreased strength;Decreased endurance;Decreased high-level IADLs  Treatment Diagnosis: Sepsis, recent R hip fx with nailing in oct 2024 with PWB precations  Activity Tolerance  Activity Tolerance: Patient Tolerated treatment well              Plan   Occupational Therapy Plan  Specific Instructions for Next Treatment: standing tolerance, endurance, functional mobility  Current Treatment Recommendations: Strengthening, Functional mobility training, Endurance training, Home management training     Restrictions  Restrictions/Precautions  Restrictions/Precautions: Fall Risk, Weight Bearing  Activity Level: Up Ad Rea  Lower Extremity Weight Bearing Restrictions  Right Lower Extremity Weight Bearing: Weight Bearing As Tolerated  Partial Weight Bearing Percentage Or Pounds: now WBAT per Dr Bowen on admit sheet  Left Lower Extremity Weight Bearing: Weight Bearing As Tolerated  Position Activity Restriction  Other  stand: Minimal assistance  Stand to sit: Modified independent  Transfer Comments: Patient required more assistance this date with sit>stand transfer from w/c        Exercise Treatment: FW: 1# BUE, 1 set x 15 reps, all planes. AROM for R shoulder d/t ROM deficits. Independent with HEP.       OutComes Score      Balance  Sitting Balance: Independent  Standing Balance: Modified independent      Standing Balance  Time: 2 handed static standing task- meal prep  Activity: 2.5 minutes      Goals  Short Term Goals  Short Term Goal 1: MET  Short Term Goal 2: MET  Short Term Goal 3: MET  Short Term Goal 4: MET  Short Term Goal 5: MET  Short Term Goal 6: MET  Short Term Goal 7: MET  Long Term Goals  Time Frame for Long Term Goals : 2 weeks  Long Term Goal 1: MET  Long Term Goal 2: MET  Long Term Goal 3: MET  Long Term Goal 4: MET  Long Term Goal 5: MET  Additional Goals?: Yes     Therapy Time   Individual Concurrent Group Co-treatment   Time In 0815         Time Out 0900         Minutes 45         Timed Code Treatment Minutes: 45 Minutes       Electronically signed by Medina Salazar OT on 1/16/2025 at 2:09 PM

## 2025-01-17 VITALS
BODY MASS INDEX: 22.31 KG/M2 | SYSTOLIC BLOOD PRESSURE: 125 MMHG | WEIGHT: 133.9 LBS | RESPIRATION RATE: 18 BRPM | OXYGEN SATURATION: 96 % | HEART RATE: 118 BPM | TEMPERATURE: 97.5 F | HEIGHT: 65 IN | DIASTOLIC BLOOD PRESSURE: 76 MMHG

## 2025-01-17 LAB
GLUCOSE BLD-MCNC: 173 MG/DL (ref 70–99)
GLUCOSE BLD-MCNC: 186 MG/DL (ref 70–99)
PERFORMED ON: ABNORMAL
PERFORMED ON: ABNORMAL

## 2025-01-17 PROCEDURE — 82962 GLUCOSE BLOOD TEST: CPT

## 2025-01-17 PROCEDURE — 94760 N-INVAS EAR/PLS OXIMETRY 1: CPT

## 2025-01-17 PROCEDURE — 99239 HOSP IP/OBS DSCHRG MGMT >30: CPT | Performed by: PSYCHIATRY & NEUROLOGY

## 2025-01-17 PROCEDURE — 6370000000 HC RX 637 (ALT 250 FOR IP): Performed by: PSYCHIATRY & NEUROLOGY

## 2025-01-17 PROCEDURE — 6370000000 HC RX 637 (ALT 250 FOR IP): Performed by: NURSE PRACTITIONER

## 2025-01-17 RX ADMIN — PANTOPRAZOLE SODIUM 40 MG: 40 TABLET, DELAYED RELEASE ORAL at 09:30

## 2025-01-17 RX ADMIN — Medication 400 MG: at 09:30

## 2025-01-17 RX ADMIN — APIXABAN 5 MG: 5 TABLET, FILM COATED ORAL at 09:29

## 2025-01-17 RX ADMIN — LEVOTHYROXINE SODIUM 100 MCG: 0.05 TABLET ORAL at 05:43

## 2025-01-17 RX ADMIN — THERA TABS 1 TABLET: TAB at 09:30

## 2025-01-17 RX ADMIN — PANCRELIPASE LIPASE, PANCRELIPASE PROTEASE, PANCRELIPASE AMYLASE 5000 UNITS: 5000; 17000; 24000 CAPSULE, DELAYED RELEASE ORAL at 09:30

## 2025-01-17 RX ADMIN — Medication 1 TABLET: at 09:30

## 2025-01-17 RX ADMIN — MIDODRINE HYDROCHLORIDE 10 MG: 10 TABLET ORAL at 09:30

## 2025-01-17 NOTE — PROGRESS NOTES
Patient is discharged home today. MSW called [facility] [phone] and spoke to Sharon to notify of discharge today. Discharge summary faxed to [facility] at [phone].   IMM letter signed.   Patient denies any other dc needs.     Electronically signed by ARPAN Patino on 1/17/25 at 8:12 AM CST

## 2025-01-17 NOTE — DISCHARGE SUMMARY
Physical Therapy DISCHARGE Note  DATE:  2025  NAME:  Jacqueline Richardson  :  1950  (74 y.o.,female)  MRN:  [id number]    HEIGHT:  Height: 165.1 cm (5' 5\")  WEIGHT:  Weight - Scale: 60.7 kg (133 lb 14.4 oz)    PATIENT DIAGNOSIS(ES):    Diagnosis: SEPSIS D/T UTI; STAGE 2 PRESSURE ULCER SACRUM; RECENT RIGHT HIP FX S/P NAILING    Additional Pertinent Hx: DM, HTN, ANXIETY, RECENT HIP NAILING, DVT, UTI  RESTRICTIONS/PRECAUTIONS:    Restrictions/Precautions  Restrictions/Precautions: Fall Risk, Weight Bearing  Activity Level: Up Ad Rea  Position Activity Restriction  Other Position/Activity Restrictions: now WBAT per Dr Bowen per admit sheet  OVERALL  ORIENTATION STATUS:  Overall Orientation Status: Within Functional Limits  PAIN:  Pain Level: 0  Pain Type: Chronic pain    Pain Location: Head     Pain Orientation: Right      GROSS ASSESSMENT        POSTURE/BALANCE  Balance  Comments: dyn stand bal activitites with support x 2 hand cga cues for posture wt shift pt fatigues easy and needs seated rest breaks       ACTIVITY TOLERANCE  Activity Tolerance  Activity Tolerance: Patient limited by endurance, Patient tolerated treatment well  Activity Tolerance Comments: fatigues easy      BED MOBILITY  Bed mobility  Rolling to Left: Independent  Rolling to Right: Independent  Supine to Sit: Modified independent  Sit to Supine: Modified independent  Scooting: Modified independent  Bed Mobility Comments: Patient up ad rea in room with a.d.  TRANSFERS  Transfers  Sit to Stand: Independent  Stand to Sit: Independent  Bed to Chair: Independent    Car Transfer: Contact guard assistance, Maximum Assistance, 2 Person Assistance (W/ RW- Low height to simulate personal [place]vy Imp[place]: CGA to enter, then Max A X 2 to safely sit in w/c due to pt sliding down (not touching floor) due to difficulty/BLE weakness/anxiety, and lack of sufficient places to push up when standing up)  Comment: Patient up ad rea in room with a.d.     AMBULATION

## 2025-01-17 NOTE — PLAN OF CARE
Problem: Chronic Conditions and Co-morbidities  Goal: Patient's chronic conditions and co-morbidity symptoms are monitored and maintained or improved  1/17/2025 1003 by Belem Ruffin RN  Outcome: Adequate for Discharge  1/17/2025 0945 by Belem Ruffin RN  Outcome: Progressing  Flowsheets (Taken 1/17/2025 0942)  Care Plan - Patient's Chronic Conditions and Co-Morbidity Symptoms are Monitored and Maintained or Improved: Monitor and assess patient's chronic conditions and comorbid symptoms for stability, deterioration, or improvement  1/16/2025 2329 by Veroncia Sanches RN  Outcome: Progressing     Problem: Discharge Planning  Goal: Discharge to home or other facility with appropriate resources  1/17/2025 1003 by Belem Ruffin RN  Outcome: Adequate for Discharge  1/17/2025 0945 by Belem Ruffin RN  Outcome: Progressing  Flowsheets (Taken 1/17/2025 0942)  Discharge to home or other facility with appropriate resources: Refer to discharge planning if patient needs post-hospital services based on physician order or complex needs related to functional status, cognitive ability or social support system  1/16/2025 2329 by Veronica Sanches RN  Outcome: Progressing     Problem: Safety - Adult  Goal: Free from fall injury  1/17/2025 1003 by Belem Ruffin RN  Outcome: Adequate for Discharge  1/17/2025 0945 by Belem Ruffin RN  Outcome: Progressing  1/16/2025 2329 by Veronica Sanches RN  Outcome: Progressing     Problem: Respiratory - Adult  Goal: Achieves optimal ventilation and oxygenation  1/17/2025 1003 by Belem Ruffin RN  Outcome: Adequate for Discharge  1/17/2025 0945 by Belem Ruffin RN  Outcome: Progressing  Flowsheets (Taken 1/17/2025 0942)  Achieves optimal ventilation and oxygenation: Assess for changes in respiratory status  1/16/2025 2329 by Veronica Sanches RN  Outcome: Progressing     Problem: Cardiovascular - Adult  Goal: Maintains optimal cardiac output and hemodynamic stability  1/17/2025 1003 by Augustin  RN  Outcome: Progressing  Flowsheets (Taken 1/17/2025 0942)  Glucose maintained within prescribed range: Monitor blood glucose as ordered  1/16/2025 2329 by Veronica Sanches RN  Outcome: Progressing     Problem: ABCDS Injury Assessment  Goal: Absence of physical injury  1/17/2025 1003 by Belem Ruffin RN  Outcome: Adequate for Discharge  1/17/2025 0945 by Belem Ruffin RN  Outcome: Progressing  1/16/2025 2329 by Veronica Sanches RN  Outcome: Progressing     Problem: Skin/Tissue Integrity  Goal: Absence of new skin breakdown  Description: 1.  Monitor for areas of redness and/or skin breakdown  2.  Assess vascular access sites hourly  3.  Every 4-6 hours minimum:  Change oxygen saturation probe site  4.  Every 4-6 hours:  If on nasal continuous positive airway pressure, respiratory therapy assess nares and determine need for appliance change or resting period.  1/17/2025 1003 by Belem Ruffin RN  Outcome: Adequate for Discharge  1/17/2025 0945 by Belem Ruffin RN  Outcome: Progressing  1/16/2025 2329 by Veronica Sanches RN  Outcome: Progressing     Problem: Nutrition Deficit:  Goal: Optimize nutritional status  1/17/2025 1003 by Belem Ruffin RN  Outcome: Adequate for Discharge  1/17/2025 0945 by Belem Ruffin RN  Outcome: Progressing  1/16/2025 2329 by Veronica Sanches RN  Outcome: Progressing     Problem: Pain  Goal: Verbalizes/displays adequate comfort level or baseline comfort level  1/17/2025 1003 by Belem Ruffin RN  Outcome: Adequate for Discharge  1/17/2025 0945 by Belem Ruffin RN  Outcome: Progressing  1/16/2025 2329 by Veronica Sanches RN  Outcome: Progressing

## 2025-01-17 NOTE — PROGRESS NOTES
Patient:   Jacqueline Richardson  MR#:    [id number]   Room:    [facility]818-   YOB: 1950  Date of Progress Note: 1/17/2025  Time of Note                           6:30 AM  Consulting Physician:   Matt Nice M.D.  Attending Physician:  Matt Nice MD     Chief complaint Sepsis    S:This 74 y.o. female  with history of DM, HTN, tobacco abuse, hypothyroid, anxiety, arthritis, hip fx s/p nailing 9/15/24, post-op DVT, UTI 2 weeks ago with sepsis, and acquired diverticulum of bladder. She presented to [facility] ER on 12/31/24 with c/o three days of weakness, chills and some diarrhea. Work-up in ER Na 139, potassium 3.8, bun 16, creat 0.8, mag 1.5, lactic 3.7 with repeat of 3.2, BNP 1422, troponin 36,and 33, alk phos 384, wbc 13.3, hgb 12.6, hct 38., resp panel neg, urine 2 + bacteria, 1 wbc trace leukocyte. CXR with right basilar atelectasis. Fluid bolus of 1000 in ED. Not sepsis bolus due to edema and recent large amount of swelling from last admission . She was admitted to the Hospitalist service for sepsis. She was placed on Maxipime and vancomycin. Patient was also noted to have a stage 2 pressure ulcer to her sacrum. Patient's Lactic has returned to normal. Blood cultures, urine culture and MRSA probe are negative thus far. Changed antibiotics to Cefepime only pending final cultures. Patient remains weak overall. She is participating in both PT/OT. She is felt to need a stay on Rehab to work towards her goal of returning home with her . She is now felt ready to start the Rehab program.    Off oxygen.  Still some fatigue at times.  No new complaints.     REVIEW OF SYSTEMS:  Constitutional: No fevers No chills  Neck:No stiffness  Respiratory: No shortness of breath  Cardiovascular: No chest pain No palpitations  Gastrointestinal: No abdominal pain    Genitourinary: No Dysuria  Neurological: No headache, no confusion    Past Medical History:      Diagnosis Date    Acquired diverticulum of bladder      REVIEW: Previous medical records, medications were reviewed at today's visit    IMPRESSION:   1.  Sepsis-complete antibiotics  2.  History of DVT-on Eliquis  3.  Diabetes-discontinued insulin due to recurrent hypoglycemia continue to monitor blood sugar-discussed with the patient she needs to monitor blood sugar closely  4.  Hypothyroidism-Synthroid  5.  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  Hypertension-meds discontinued due to low blood pressure  7.  GI-PPI/bowel regimen  8.  Vitamin D deficiency-on vitamin D  9.  Urinary incontinence/diverticulum of bladder-monitor  10.  Diarrhea-Imodium as needed  11.  PT/OT    Increase midodrine and DC spironolactone/metoprolol with low blood pressure    Check urinalysis    Continue current care    Dc home        Expected duration and frequency therapy: 180 minutes per day, 5 days per week    CALL WITH ANY QUESTIONS  [phone] CELL  Dr Matt Nice

## 2025-01-17 NOTE — PLAN OF CARE
Problem: Chronic Conditions and Co-morbidities  Goal: Patient's chronic conditions and co-morbidity symptoms are monitored and maintained or improved  1/16/2025 2329 by Veronica Sanches RN  Outcome: Progressing  1/16/2025 1037 by Clarita Chisholm LPN  Outcome: Progressing  Flowsheets (Taken 1/16/2025 0806)  Care Plan - Patient's Chronic Conditions and Co-Morbidity Symptoms are Monitored and Maintained or Improved: Monitor and assess patient's chronic conditions and comorbid symptoms for stability, deterioration, or improvement     Problem: Discharge Planning  Goal: Discharge to home or other facility with appropriate resources  1/16/2025 2329 by Veronica Sanches RN  Outcome: Progressing  1/16/2025 1037 by Clarita Chisholm LPN  Outcome: Progressing  Flowsheets (Taken 1/16/2025 0806)  Discharge to home or other facility with appropriate resources: Refer to discharge planning if patient needs post-hospital services based on physician order or complex needs related to functional status, cognitive ability or social support system     Problem: Safety - Adult  Goal: Free from fall injury  1/16/2025 2329 by Veronica Sanches RN  Outcome: Progressing  1/16/2025 1037 by Clarita Chisholm LPN  Outcome: Progressing     Problem: Respiratory - Adult  Goal: Achieves optimal ventilation and oxygenation  1/16/2025 2329 by Veronica Sanches RN  Outcome: Progressing  1/16/2025 1037 by Clarita Chisholm LPN  Outcome: Progressing  Flowsheets (Taken 1/16/2025 0806)  Achieves optimal ventilation and oxygenation: Assess for changes in respiratory status     Problem: Cardiovascular - Adult  Goal: Maintains optimal cardiac output and hemodynamic stability  1/16/2025 2329 by Veronica Sanches RN  Outcome: Progressing  1/16/2025 1037 by Clarita Chisholm LPN  Outcome: Progressing  Flowsheets (Taken 1/16/2025 0806)  Maintains optimal cardiac output and hemodynamic stability: Monitor blood pressure and heart rate  Goal: Absence of cardiac  Deficit:  Goal: Optimize nutritional status  Outcome: Progressing     Problem: Skin/Tissue Integrity - Adult  Goal: Skin integrity remains intact  1/16/2025 2329 by Veronica Sanches RN  Outcome: Progressing  1/16/2025 1037 by Clarita Chisholm LPN  Outcome: Progressing  Flowsheets (Taken 1/16/2025 0714)  Skin Integrity Remains Intact: Monitor for areas of redness and/or skin breakdown     Problem: Metabolic/Fluid and Electrolytes - Adult  Goal: Electrolytes maintained within normal limits  1/16/2025 2329 by Veronica Sanches RN  Outcome: Progressing  1/16/2025 1037 by Clarita Chisholm LPN  Outcome: Progressing  Flowsheets (Taken 1/16/2025 0806)  Electrolytes maintained within normal limits: Monitor labs and assess patient for signs and symptoms of electrolyte imbalances  Goal: Hemodynamic stability and optimal renal function maintained  1/16/2025 2329 by Veronica Sanches RN  Outcome: Progressing  1/16/2025 1037 by Clarita Chisholm LPN  Outcome: Progressing  Flowsheets (Taken 1/16/2025 0806)  Hemodynamic stability and optimal renal function maintained: Monitor labs and assess for signs and symptoms of volume excess or deficit  Goal: Glucose maintained within prescribed range  1/16/2025 2329 by Veronica Sanches RN  Outcome: Progressing  1/16/2025 1037 by Clarita Chisholm LPN  Outcome: Progressing  Flowsheets (Taken 1/16/2025 0806)  Glucose maintained within prescribed range: Monitor blood glucose as ordered     Problem: Pain  Goal: Verbalizes/displays adequate comfort level or baseline comfort level  1/16/2025 2329 by Veronica Sanches RN  Outcome: Progressing  1/16/2025 1037 by Clarita Chisholm LPN  Outcome: Progressing

## 2025-01-17 NOTE — DISCHARGE SUMMARY
Occupational Therapy Discharge Summary         Date: 2025  Patient Name: Jacqueline Richardson        MRN: [id number]    : 1950  (74 y.o.)  Gender: female   Referring Practitioner: Vicki Rodriguez APRN - CNP, Milly Main MD  Diagnosis: SEPSIS D/T UTI; STAGE 2 PRESSURE ULCER SACRUM; RECENT RIGHT HIP FX S/P NAILING  Restrictions/Precautions  Restrictions/Precautions: Fall Risk, Weight Bearing  Activity Level: Up Ad Rea      Discharge Date: 2025      UE Functionin/03/25 1015   LUE AROM (degrees)   LUE AROM  WFL   RUE AROM (degrees)   RUE General AROM (0-45) proximal ROM due to shoulder injury, WFLs distally       Home Management:  Functional Mobility  Functional - Mobility Device: Rolling Walker  Activity: Other (w/c > bed)  Assist Level: Modified independent   Functional Mobility Comments: reaching acts in simulated home environment    Adaptive Equipment/DME Status:  Bathroom Equipment: Grab bars in shower, Grab bars around toilet, 3-in-1 Commode  Home Equipment: Walker - Rolling      Pain Assessment:   No pain        Remaining Problems:  Decreased functional mobility ; Decreased strength; Decreased endurance; Decreased high-level IADLs     STGs:  Short Term Goals  Short Term Goal 1: Patient will complete HEP x 5 occasions in order to improve strength and endurance for ADLs  Short Term Goal 2: pt will complete overall toileting with CGA  Short Term Goal 3: pt will complete LB dressing with AE prn with CGA  Short Term Goal 4: pt will complete simple ambulatory home making task with CGA for balance  Short Term Goal 5: pt will complete overall bathing with CGA  Short Term Goal 6: pt will complete 1-2 handed standing level task for 2 mins with SBA for balance  Short Term Goal 7: pt will don/doff UB dressing with setup  MET  Short Term Goals    LTGs:  Long Term Goals  Time Frame for Long Term Goals : 2 weeks  Long Term Goal 1: pt will complete overall toileting with modified

## 2025-01-17 NOTE — PLAN OF CARE
baseline  1/17/2025 0945 by Belem Ruffin RN  Outcome: Progressing  Flowsheets (Taken 1/17/2025 0942)  Absence of cardiac dysrhythmias or at baseline: Monitor cardiac rate and rhythm  1/16/2025 2329 by Veronica Sanches RN  Outcome: Progressing     Problem: Musculoskeletal - Adult  Goal: Return mobility to safest level of function  1/17/2025 0945 by Belem Ruffin RN  Outcome: Progressing  Flowsheets (Taken 1/17/2025 0942)  Return Mobility to Safest Level of Function: Assess patient stability and activity tolerance for standing, transferring and ambulating with or without assistive devices  1/16/2025 2329 by Veronica Sanches RN  Outcome: Progressing  Goal: Maintain proper alignment of affected body part  1/17/2025 0945 by Belem Ruffin RN  Outcome: Progressing  Flowsheets (Taken 1/17/2025 0942)  Maintain proper alignment of affected body part: Instruct and reinforce with patient and family use of appropriate assistive device and precautions (e.g. spinal or hip dislocation precautions)  1/16/2025 2329 by Veronica Sanches RN  Outcome: Progressing  Goal: Return ADL status to a safe level of function  1/17/2025 0945 by Belem Ruffin RN  Outcome: Progressing  Flowsheets (Taken 1/17/2025 0942)  Return ADL Status to a Safe Level of Function: Assist and instruct patient to increase activity and self care as tolerated  1/16/2025 2329 by Veronica Sanches RN  Outcome: Progressing     Problem: Genitourinary - Adult  Goal: Absence of urinary retention  1/17/2025 0945 by Belem Ruffin RN  Outcome: Progressing  Flowsheets (Taken 1/17/2025 0942)  Absence of urinary retention: Assess patient’s ability to void and empty bladder  1/16/2025 2329 by Veronica Sanches RN  Outcome: Progressing     Problem: Skin/Tissue Integrity - Adult  Goal: Skin integrity remains intact  1/17/2025 0945 by Belem Ruffin RN  Outcome: Progressing  Flowsheets (Taken 1/17/2025 0942)  Skin Integrity Remains Intact: Monitor for areas of redness and/or skin  status  1/17/2025 0945 by Belem Ruffin, RN  Outcome: Progressing  1/16/2025 2329 by Veronica Sanches RN  Outcome: Progressing     Problem: Pain  Goal: Verbalizes/displays adequate comfort level or baseline comfort level  1/17/2025 0945 by Belem Ruffin RN  Outcome: Progressing  1/16/2025 2329 by Veronica Sanches, RN  Outcome: Progressing

## 2025-01-30 DIAGNOSIS — Z95.818 STATUS POST PLACEMENT OF IMPLANTABLE LOOP RECORDER: Primary | ICD-10-CM

## 2025-01-30 DIAGNOSIS — R55 SYNCOPE AND COLLAPSE: ICD-10-CM

## 2025-03-06 DIAGNOSIS — R55 SYNCOPE AND COLLAPSE: ICD-10-CM

## 2025-03-06 DIAGNOSIS — Z95.818 STATUS POST PLACEMENT OF IMPLANTABLE LOOP RECORDER: Primary | ICD-10-CM

## 2025-03-06 PROCEDURE — 93298 REM INTERROG DEV EVAL SCRMS: CPT | Performed by: CLINICAL NURSE SPECIALIST

## (undated) DEVICE — GLV SURG DERMASSURE GRN LF PF 8.0

## (undated) DEVICE — DUAL CUT SAGITTAL BLADE

## (undated) DEVICE — SINGLE-USE POLYPECTOMY SNARE: Brand: CAPTIVATOR II

## (undated) DEVICE — ANTIBACTERIAL UNDYED BRAIDED (POLYGLACTIN 910), SYNTHETIC ABSORBABLE SUTURE: Brand: COATED VICRYL

## (undated) DEVICE — SUTURE VICRYL + SZ 2-0 L36IN ABSRB UD L36MM CT-1 1/2 CIR VCP945H

## (undated) DEVICE — SNAR POLYP SENSATION MICRO OVL 13 240X40

## (undated) DEVICE — BIT DRL L300MM DIA5MM CANN QUIK CPL ADJ STP REUSE

## (undated) DEVICE — PACK ANT HIP CDS

## (undated) DEVICE — ST PIN FIX TEMP UNIVERS REVERS W/OSTEO/GUIDE/PIN 2.4MM STRL

## (undated) DEVICE — Device: Brand: DEFENDO AIR/WATER/SUCTION AND BIOPSY VALVE

## (undated) DEVICE — GLV SURG SENSICARE PI ORTHO SZ8 LF STRL

## (undated) DEVICE — GUIDEWIRE ORTH L300MM DIA2.8MM S STL THRD SHRP TRCR TIP FOR

## (undated) DEVICE — GLOVE ORTHO 7 1/2   MSG9475

## (undated) DEVICE — THE CHANNEL CLEANING BRUSH IS A NYLON FLEXI BRUSH ATTACHED TO A FLEXIBLE PLASTIC SHEATH DESIGNED TO SAFELY REMOVE DEBRIS FROM FLEXIBLE ENDOSCOPES.

## (undated) DEVICE — MASK,OXYGEN,MED CONC,ADLT,7' TUB, UC: Brand: PENDING

## (undated) DEVICE — SUTURE VICRYL + SZ 0 L27IN ABSRB UD CT-1 L36MM 1/2 CIR TAPR VCP260H

## (undated) DEVICE — TBG SMPL FLTR LINE NASL 02/C02 A/ BX/100

## (undated) DEVICE — 4-PORT MANIFOLD: Brand: NEPTUNE 2

## (undated) DEVICE — BAPTIST TURNOVER KIT: Brand: MEDLINE INDUSTRIES, INC.

## (undated) DEVICE — CUFF,BP,DISP,1 TUBE,ADULT,HP: Brand: MEDLINE

## (undated) DEVICE — THE SINGLE USE ETRAP – POLYP TRAP IS USED FOR SUCTION RETRIEVAL OF ENDOSCOPICALLY REMOVED POLYPS.: Brand: ETRAP

## (undated) DEVICE — C-ARM: Brand: UNBRANDED

## (undated) DEVICE — T-MAX DISPOSABLE FACE MASK 8 PER BOX

## (undated) DEVICE — 1010 S-DRAPE TOWEL DRAPE 10/BX: Brand: STERI-DRAPE™

## (undated) DEVICE — SENSR O2 OXIMAX FNGR A/ 18IN NONSTR

## (undated) DEVICE — ENDOGATOR AUXILIARY WATER JET CONNECTOR: Brand: ENDOGATOR

## (undated) DEVICE — OPTIFOAM GENTLE SA, POSTOP, 4X8: Brand: MEDLINE

## (undated) DEVICE — INTENDED FOR TISSUE SEPARATION, AND OTHER PROCEDURES THAT REQUIRE A SHARP SURGICAL BLADE TO PUNCTURE OR CUT.: Brand: BARD-PARKER ® STAINLESS STEEL BLADES

## (undated) DEVICE — CURAVIEW LED LARYNGOSCOPE BLADE & HANDLE,DISPOSABLE,MAC 3.5: Brand: CURAPLEX

## (undated) DEVICE — SUT ETHIB 5 V37 30IN MB66G

## (undated) DEVICE — BIPOLAR SEALER 23-112-1 AQM 6.0: Brand: AQUAMANTYS™

## (undated) DEVICE — 3M™ STERI-DRAPE™ U-DRAPE 1015: Brand: STERI-DRAPE™

## (undated) DEVICE — C-ARMOR C-ARM EQUIPMENT COVERS CLEAR STERILE UNIVERSAL FIT 12 PER CASE: Brand: C-ARMOR

## (undated) DEVICE — STAPLER, SKIN, 35W, A: Brand: MEDLINE INDUSTRIES, INC.

## (undated) DEVICE — PK SHLDR 30

## (undated) DEVICE — 3M™ IOBAN™ 2 ANTIMICROBIAL INCISE DRAPE 6651EZ: Brand: IOBAN™ 2

## (undated) DEVICE — TRAP FLD MINIVAC MEGADYNE 100ML

## (undated) DEVICE — SPNG GZ STRL 2S 4X4 12PLY

## (undated) DEVICE — HANDPIECE SET WITH HIGH FLOW TIP AND SUCTION TUBE: Brand: INTERPULSE

## (undated) DEVICE — TUBE ET 7MM NSL ORAL BASIC CUF INTMED MURPHY EYE RADPQ MRK

## (undated) DEVICE — SURGICAL PROCEDURE PACK LOWER EXTREMITY LOURDES HOSP

## (undated) DEVICE — ADHS SKIN PREMIERPRO EXOFIN TOPICAL HI/VISC .5ML

## (undated) DEVICE — GLOVE SURG SZ 8 L12IN FNGR THK79MIL GRN LTX FREE

## (undated) DEVICE — BNDG GZ SOF-FORM CONFRM 2X75IN LF STRL